# Patient Record
Sex: MALE | Race: BLACK OR AFRICAN AMERICAN | Employment: FULL TIME | URBAN - METROPOLITAN AREA
[De-identification: names, ages, dates, MRNs, and addresses within clinical notes are randomized per-mention and may not be internally consistent; named-entity substitution may affect disease eponyms.]

---

## 2017-01-12 ENCOUNTER — HOSPITAL ENCOUNTER (OUTPATIENT)
Dept: MRI IMAGING | Age: 62
Discharge: HOME | End: 2017-01-12
Attending: ORTHOPAEDIC SURGERY
Payer: COMMERCIAL

## 2017-01-12 DIAGNOSIS — M25.562 ACUTE PAIN OF LEFT KNEE: ICD-10-CM

## 2017-01-12 DIAGNOSIS — S76.112A QUADRICEPS TENDON RUPTURE, LEFT, INITIAL ENCOUNTER: ICD-10-CM

## 2017-01-12 PROCEDURE — 73721 MRI JNT OF LWR EXTRE W/O DYE: CPT

## 2017-01-16 PROBLEM — S76.112D QUADRICEPS TENDON RUPTURE, LEFT, SUBSEQUENT ENCOUNTER: Status: ACTIVE | Noted: 2017-01-16

## 2017-01-17 ENCOUNTER — LAB ENCOUNTER (OUTPATIENT)
Dept: LAB | Facility: HOSPITAL | Age: 62
End: 2017-01-17
Attending: INTERNAL MEDICINE
Payer: OTHER MISCELLANEOUS

## 2017-01-17 ENCOUNTER — OFFICE VISIT (OUTPATIENT)
Dept: INTERNAL MEDICINE CLINIC | Facility: CLINIC | Age: 62
End: 2017-01-17

## 2017-01-17 VITALS
BODY MASS INDEX: 40.63 KG/M2 | SYSTOLIC BLOOD PRESSURE: 138 MMHG | HEIGHT: 72 IN | DIASTOLIC BLOOD PRESSURE: 90 MMHG | WEIGHT: 300 LBS | HEART RATE: 76 BPM

## 2017-01-17 DIAGNOSIS — Z01.818 PREOP EXAMINATION: Primary | ICD-10-CM

## 2017-01-17 DIAGNOSIS — Z01.818 PREOP EXAMINATION: ICD-10-CM

## 2017-01-17 LAB
ALBUMIN SERPL BCP-MCNC: 4.1 G/DL (ref 3.5–4.8)
ALBUMIN/GLOB SERPL: 1.2 {RATIO} (ref 1–2)
ALP SERPL-CCNC: 45 U/L (ref 32–100)
ALT SERPL-CCNC: 21 U/L (ref 17–63)
ANION GAP SERPL CALC-SCNC: 12 MMOL/L (ref 0–18)
AST SERPL-CCNC: 26 U/L (ref 15–41)
BILIRUB SERPL-MCNC: 0.8 MG/DL (ref 0.3–1.2)
BUN SERPL-MCNC: 12 MG/DL (ref 8–20)
BUN/CREAT SERPL: 11.1 (ref 10–20)
CALCIUM SERPL-MCNC: 9.4 MG/DL (ref 8.5–10.5)
CHLORIDE SERPL-SCNC: 101 MMOL/L (ref 95–110)
CO2 SERPL-SCNC: 28 MMOL/L (ref 22–32)
CREAT SERPL-MCNC: 1.08 MG/DL (ref 0.5–1.5)
GLOBULIN PLAS-MCNC: 3.5 G/DL (ref 2.5–3.7)
GLUCOSE SERPL-MCNC: 109 MG/DL (ref 70–99)
OSMOLALITY UR CALC.SUM OF ELEC: 292 MOSM/KG (ref 275–295)
POTASSIUM SERPL-SCNC: 3.6 MMOL/L (ref 3.3–5.1)
PROT SERPL-MCNC: 7.6 G/DL (ref 5.9–8.4)
SODIUM SERPL-SCNC: 141 MMOL/L (ref 136–144)

## 2017-01-17 PROCEDURE — 93010 ELECTROCARDIOGRAM REPORT: CPT | Performed by: INTERNAL MEDICINE

## 2017-01-17 PROCEDURE — 93005 ELECTROCARDIOGRAM TRACING: CPT

## 2017-01-17 PROCEDURE — 36415 COLL VENOUS BLD VENIPUNCTURE: CPT

## 2017-01-17 PROCEDURE — 99212 OFFICE O/P EST SF 10 MIN: CPT | Performed by: INTERNAL MEDICINE

## 2017-01-17 PROCEDURE — 99204 OFFICE O/P NEW MOD 45 MIN: CPT | Performed by: INTERNAL MEDICINE

## 2017-01-17 PROCEDURE — 80053 COMPREHEN METABOLIC PANEL: CPT

## 2017-01-17 NOTE — H&P
Pre Op Visit    This patient tore his left quadriceps tendon at work 1 week ago slipping on some water. His left leg jacknifed. Of interest is he tore the same tendon with a similar injury 20 years ago.   He is scheduled to have it surgically reattacehd a (MULTIVITAMINS) Oral Cap, Take 1 capsule by mouth. Indications: 3 times week, mon/wed/fri, Disp: , Rfl:   •  aspirin 325 MG Oral Tab, Take 325 mg by mouth daily. , Disp: , Rfl:   •  Fish Oil-Cholecalciferol (FISH OIL + D3 OR), Take 1 capsule by mouth daily. no hepatosplenomegaly, non tender, no abdominal bruits  MS: normal ROM back and all extremities  No joint swelling or deformities left leg in immobilizer    Skin-no suspicious lesions or rash  Neurologic--Cranial Nerves II-XIIgrossly intact   Normal propri

## 2017-01-18 ENCOUNTER — TELEPHONE (OUTPATIENT)
Dept: INTERNAL MEDICINE CLINIC | Facility: CLINIC | Age: 62
End: 2017-01-18

## 2017-01-18 NOTE — TELEPHONE ENCOUNTER
----- Message from Ranjit Oviedo MD sent at 1/18/2017 12:01 PM CST -----        Patient to have surgery at [place] next week       Labs, ekg reviewed, he is medically cleared, see note

## 2017-02-20 ENCOUNTER — OFFICE VISIT (OUTPATIENT)
Dept: PHYSICAL THERAPY | Age: 62
End: 2017-02-20
Attending: ORTHOPAEDIC SURGERY
Payer: OTHER MISCELLANEOUS

## 2017-02-20 DIAGNOSIS — S76.112D QUADRICEPS TENDON RUPTURE, LEFT, SUBSEQUENT ENCOUNTER: Primary | ICD-10-CM

## 2017-02-20 PROCEDURE — 97110 THERAPEUTIC EXERCISES: CPT

## 2017-02-20 PROCEDURE — 97161 PT EVAL LOW COMPLEX 20 MIN: CPT

## 2017-02-20 NOTE — PROGRESS NOTES
POST-OP KNEE EVALUATION:   Referring Physician: Dr. Mark Atkins  Diagnosis: s/p L quadriceps tendon rupture repair 1/24/17  Date of Service: 2/20/2017     PATIENT SUMMARY   Mckinley Viramontes is a 63year old y/o male who presents to therapy today s/p quadriceps t tendon rupture repair on the L 1997    ASSESSMENT  Pt is appropriate for 4 weeks s/p L quadriceps tendon rupture repair. He has impairments in gait 2/2 to brace and DME, he appears to be following instructions for precautions. He has full extension ROM.  He Treatment Time: 35 min     PLAN OF CARE:    Goals: (To be met in 8 visits)   · Pt will improve knee AROM flexion to >110 degrees to improve ability to perform stairs, standing from chair.    · Pt will improve quad strength to 4/5 to ascend 1 flight of stair

## 2017-02-22 ENCOUNTER — OFFICE VISIT (OUTPATIENT)
Dept: PHYSICAL THERAPY | Age: 62
End: 2017-02-22
Attending: ORTHOPAEDIC SURGERY
Payer: OTHER MISCELLANEOUS

## 2017-02-22 PROCEDURE — 97016 VASOPNEUMATIC DEVICE THERAPY: CPT

## 2017-02-22 PROCEDURE — 97110 THERAPEUTIC EXERCISES: CPT

## 2017-02-23 NOTE — PROGRESS NOTES
Dx: s/p L quadriceps tendon rupture repair 1/24/17           Authorized # of Visits:  w/c         Next MD visit: Follow up appt: 3/20/17  Fall Risk: standard         Precautions: no active knee extension, 0-90 knee flexion           4 weeks out  Subjective 40 min  Total Treatment Time: 50 min

## 2017-02-27 ENCOUNTER — OFFICE VISIT (OUTPATIENT)
Dept: PHYSICAL THERAPY | Age: 62
End: 2017-02-27
Attending: ORTHOPAEDIC SURGERY
Payer: OTHER MISCELLANEOUS

## 2017-02-27 PROCEDURE — 97110 THERAPEUTIC EXERCISES: CPT

## 2017-02-27 PROCEDURE — 97530 THERAPEUTIC ACTIVITIES: CPT

## 2017-02-27 NOTE — PROGRESS NOTES
Dx: s/p L quadriceps tendon rupture repair 1/24/17           Authorized # of Visits:  w/c         Next MD visit: Follow up appt: 3/20/17  Fall Risk: standard         Precautions: no active knee extension, 0-90 knee flexion           4 weeks out  Subjective compression; 34 deg; 10 min Double leg heel raises 20 reps with UE assist         - Ice 10 min with extension        - -        - -        - -        HEP (written handouts provided) and pt education:  3 way hip with brace in place R/L     Charges:  There ex

## 2017-03-01 ENCOUNTER — OFFICE VISIT (OUTPATIENT)
Dept: PHYSICAL THERAPY | Age: 62
End: 2017-03-01
Attending: ORTHOPAEDIC SURGERY
Payer: OTHER MISCELLANEOUS

## 2017-03-01 PROCEDURE — 97530 THERAPEUTIC ACTIVITIES: CPT

## 2017-03-01 PROCEDURE — 97110 THERAPEUTIC EXERCISES: CPT

## 2017-03-01 NOTE — PROGRESS NOTES
Dx: s/p L quadriceps tendon rupture repair 1/24/17           Authorized # of Visits:  w/c         Next MD visit: Follow up appt: 3/20/17  Fall Risk: standard         Precautions: no active knee extension, 0-90 knee flexion           5 weeks out  Subjective WS with brace in // bars medial lateral 20 reps; forward/back 20 reps X forward back 20 reps Manual HS stretch 30 sec hold x 3 sets       Game ready system: medium compression; 34 deg; 10 min Double leg heel raises 20 reps with UE assist  X 20 reps

## 2017-03-06 ENCOUNTER — OFFICE VISIT (OUTPATIENT)
Dept: PHYSICAL THERAPY | Age: 62
End: 2017-03-06
Attending: ORTHOPAEDIC SURGERY
Payer: OTHER MISCELLANEOUS

## 2017-03-06 PROCEDURE — 97110 THERAPEUTIC EXERCISES: CPT

## 2017-03-06 PROCEDURE — 97530 THERAPEUTIC ACTIVITIES: CPT

## 2017-03-06 NOTE — PROGRESS NOTES
Dx: s/p L quadriceps tendon rupture repair 1/24/17           Authorized # of Visits:  w/c         Next MD visit: Follow up appt: 3/20/17  Fall Risk: standard         Precautions: no active knee extension, 0-90 knee flexion           6 weeks out  Subjective 10 reps x 2 sets X 10 reps x 2 sets 5 sec hold X 5 reps x 4 sets Manual HS stretch 30 sec hold x 3 sets      4 way hip lifts with brace 10 reps each 2 sets X R/L  X 1 set standing; 1 set supine Gait training with brace unlocked to 30 deg Vcs for knee bend,

## 2017-03-08 ENCOUNTER — OFFICE VISIT (OUTPATIENT)
Dept: PHYSICAL THERAPY | Age: 62
End: 2017-03-08
Attending: ORTHOPAEDIC SURGERY
Payer: OTHER MISCELLANEOUS

## 2017-03-08 PROCEDURE — 97110 THERAPEUTIC EXERCISES: CPT

## 2017-03-08 PROCEDURE — 97530 THERAPEUTIC ACTIVITIES: CPT

## 2017-03-08 NOTE — PROGRESS NOTES
Dx: s/p L quadriceps tendon rupture repair 1/24/17           Authorized # of Visits:  w/c         Next MD visit: Follow up appt: 3/20/17  Fall Risk: standard         Precautions: no active knee extension, 0-90 knee flexion           6.5 weeks out  Norman Prosper sets X 10 reps x 2 sets 5 sec hold X 5 reps x 4 sets Manual HS stretch 30 sec hold x 3 sets X 3 sets     4 way hip lifts with brace 10 reps each 2 sets X R/L  X 1 set standing; 1 set supine Gait training with brace unlocked to 30 deg Vcs for knee bendsiddharth

## 2017-03-13 ENCOUNTER — OFFICE VISIT (OUTPATIENT)
Dept: PHYSICAL THERAPY | Age: 62
End: 2017-03-13
Attending: ORTHOPAEDIC SURGERY
Payer: OTHER MISCELLANEOUS

## 2017-03-13 PROCEDURE — 97530 THERAPEUTIC ACTIVITIES: CPT

## 2017-03-13 PROCEDURE — 97110 THERAPEUTIC EXERCISES: CPT

## 2017-03-13 NOTE — PROGRESS NOTES
Dx: s/p L quadriceps tendon rupture repair 1/24/17           Authorized # of Visits:  w/c         Next MD visit: Follow up appt: 3/20/17  Fall Risk: standard         Precautions: no active knee extension, 0-90 knee flexion           7 weeks out  Subjective mobilization gr III medial/lateral, superior,inferior  5 min X 5 min -  SLR with brace in place manual assist 10 reps x 2 sets X 10 reps x 2 sets X 10 reps x 2 sets    Knee extension stretch with towel under heel 5 min X 5 min with gentle manual  rhythmic Sidelying hip abduction with manual assist 25% 10 reps brace immobilized in extension - - -     - - Supine SLR 10 reps with manual assist 50% brace immobilized in extension 10 reps - - Pt education: HEP recommendations, benefits, progress, ROM improvements

## 2017-03-15 ENCOUNTER — OFFICE VISIT (OUTPATIENT)
Dept: PHYSICAL THERAPY | Age: 62
End: 2017-03-15
Attending: ORTHOPAEDIC SURGERY
Payer: OTHER MISCELLANEOUS

## 2017-03-15 PROCEDURE — 97530 THERAPEUTIC ACTIVITIES: CPT

## 2017-03-15 PROCEDURE — 97110 THERAPEUTIC EXERCISES: CPT

## 2017-03-15 NOTE — PROGRESS NOTES
Dx: s/p L quadriceps tendon rupture repair 1/24/17           Authorized # of Visits:  w/c         Next MD visit: Follow up appt: 3/20/17  Fall Risk: standard         Precautions: no active knee extension, 0-90 knee flexion           7.5 weeks out  Progress was advised of these findings, precautions, and treatment options and has agreed to actively participate in planning and for this course of care. Thank you for your referral. If you have any questions, please contact me at Dept: [phone].     Sincere Game ready system: medium compression; 34 deg; 10 min Double leg heel raises 20 reps with UE assist  X 20 reps  Prone knee flexion 10 reps x 2 sets X 10 reps x2  sets TKE in standing blue ball 10 reps x 2 sets X 10 reps 2 sets   - Ice 10 min with extensi

## 2017-03-17 ENCOUNTER — HOSPITAL ENCOUNTER (EMERGENCY)
Facility: HOSPITAL | Age: 62
Discharge: HOME | End: 2017-03-17
Attending: EMERGENCY MEDICINE
Payer: COMMERCIAL

## 2017-03-17 ENCOUNTER — APPOINTMENT (OUTPATIENT)
Dept: ULTRASOUND IMAGING | Facility: HOSPITAL | Age: 62
End: 2017-03-17
Attending: EMERGENCY MEDICINE
Payer: COMMERCIAL

## 2017-03-17 ENCOUNTER — HOSPITAL ENCOUNTER (OUTPATIENT)
Age: 62
Discharge: ANOTHER HOSPITAL | End: 2017-03-17
Payer: COMMERCIAL

## 2017-03-17 VITALS
HEART RATE: 77 BPM | RESPIRATION RATE: 20 BRPM | SYSTOLIC BLOOD PRESSURE: 128 MMHG | TEMPERATURE: 98 F | OXYGEN SATURATION: 96 % | DIASTOLIC BLOOD PRESSURE: 70 MMHG

## 2017-03-17 VITALS
RESPIRATION RATE: 16 BRPM | HEIGHT: 72 IN | TEMPERATURE: 98 F | HEART RATE: 77 BPM | WEIGHT: 300 LBS | OXYGEN SATURATION: 96 % | DIASTOLIC BLOOD PRESSURE: 85 MMHG | BODY MASS INDEX: 40.63 KG/M2 | SYSTOLIC BLOOD PRESSURE: 133 MMHG

## 2017-03-17 DIAGNOSIS — L03.116 CELLULITIS OF LEFT LOWER EXTREMITY: ICD-10-CM

## 2017-03-17 DIAGNOSIS — M79.89 SWELLING OF LOWER EXTREMITY: ICD-10-CM

## 2017-03-17 DIAGNOSIS — R60.9 DEPENDENT EDEMA: Primary | ICD-10-CM

## 2017-03-17 DIAGNOSIS — M79.672 FOOT PAIN, LEFT: Primary | ICD-10-CM

## 2017-03-17 PROCEDURE — 99284 EMERGENCY DEPT VISIT MOD MDM: CPT

## 2017-03-17 PROCEDURE — 99215 OFFICE O/P EST HI 40 MIN: CPT

## 2017-03-17 PROCEDURE — 96372 THER/PROPH/DIAG INJ SC/IM: CPT

## 2017-03-17 PROCEDURE — 93971 EXTREMITY STUDY: CPT

## 2017-03-17 RX ORDER — KETOROLAC TROMETHAMINE 30 MG/ML
60 INJECTION, SOLUTION INTRAMUSCULAR; INTRAVENOUS ONCE
Status: COMPLETED | OUTPATIENT
Start: 2017-03-17 | End: 2017-03-17

## 2017-03-17 RX ORDER — CLINDAMYCIN HYDROCHLORIDE 300 MG/1
300 CAPSULE ORAL 3 TIMES DAILY
Qty: 30 CAPSULE | Refills: 0 | Status: SHIPPED | OUTPATIENT
Start: 2017-03-17 | End: 2017-03-27

## 2017-03-17 RX ORDER — HYDROCODONE BITARTRATE AND ACETAMINOPHEN 5; 325 MG/1; MG/1
1-2 TABLET ORAL EVERY 6 HOURS PRN
Qty: 15 TABLET | Refills: 0 | Status: SHIPPED | OUTPATIENT
Start: 2017-03-17 | End: 2017-10-03 | Stop reason: ALTCHOICE

## 2017-03-17 NOTE — ED PROVIDER NOTES
Patient Seen in: THE MEDICAL [facility]    History   Patient presents with:  Swelling    Stated Complaint: s/p surgery 8 wks ago - l foot swelling and pain    HPI    42-year-old male who had surgery in his left quadricep 8 weeks ago present (FLAXSEED OIL) 1000 MG Oral Cap,  Take  by mouth. Psyllium (TGT PSYLLIUM FIBER) 0.52 G Oral Cap,  Take  by mouth. Multiple Vitamin (MULTIVITAMINS) Oral Cap,  Take 1 capsule by mouth.  Indications: 3 times week, mon/wed/fri   Fish Oil-Cholecalciferol (FI respiratory distress. He has no rhonchi. Musculoskeletal: Normal range of motion. Diffuse left foot swelling with some mild erythema just proximal to the left third and fourth toes. Mildly warm to touch.   Patient has point tenderness on the plantar rodriguez

## 2017-03-18 NOTE — ED PROVIDER NOTES
Patient Seen in: [facility] Emergency Department    History   Patient presents with:  Cellulitis (integumentary, infectious)  Postop/Procedure Problem    Stated Complaint: cellulitis to his left foot, post op    HPI    Patient is a 72-year-old male Hr,  1 tablet by mouth daily   AmLODIPine Besylate (NORVASC) 5 MG Oral Tab,  1 tablet by mouth daily   Albuterol Sulfate HFA (PROVENTIL HFA) 108 (90 BASE) MCG/ACT Inhalation Aero Soln,  Inhale 2 puffs into the lungs every 4 (four) hours as needed.    Flutic 1927 None (Room air)       Current:/88 mmHg  Pulse 70  Temp(Src) 97.8 °F (36.6 °C) (Temporal)  Resp 18  Ht 182.9 cm (6')  Wt 136.079 kg  BMI 40.68 kg/m2  SpO2 96%        Physical Exam    GENERAL: Well-developed, well-nourished   Male sitting up breat home and return to the ER immediately if symptoms worsen or if any other problems arise. Patient was discharged home with no further new complaints.           Disposition and Plan     Clinical Impression:  Dependent edema  (primary encounter diagnosis)  Ce

## 2017-03-18 NOTE — ED NOTES
Pt back in room from [address]. Requested something to eat and drink. Ok per MD and pt provided with a turkey sandwich and a cup of water.

## 2017-03-18 NOTE — ED INITIAL ASSESSMENT (HPI)
Referral from [address] [facility]. Pt complained of L foot pain & swelling, S/P muscle repair 8 weeks.  Denies any fever

## 2017-03-22 ENCOUNTER — OFFICE VISIT (OUTPATIENT)
Dept: PHYSICAL THERAPY | Age: 62
End: 2017-03-22
Attending: INTERNAL MEDICINE
Payer: OTHER MISCELLANEOUS

## 2017-03-22 PROCEDURE — 97530 THERAPEUTIC ACTIVITIES: CPT

## 2017-03-22 PROCEDURE — 97110 THERAPEUTIC EXERCISES: CPT

## 2017-03-22 NOTE — PROGRESS NOTES
Dx: s/p L quadriceps tendon rupture repair 1/24/17           Authorized # of Visits:  w/c         Next MD visit: Follow up appt: 3/20/17  Fall Risk: standard         Precautions: no active knee extension, 0-90 knee flexion           7.5 weeks out  [place] Courser will be progressed    Plan: Continue per plan of care. Plan for next session: warm up in nu step without brace; progress per protocol.        Date: 2/22/2017  Tx#: 2/8 Date: 2/27/17  Tx#: 3/8 Date: 3/1/17  Tx#: 4/8 Date: 3/6/17  Tx#: 5/8 Date: 3/8/17  Tx#: reps  X good control into extension 10 reps Gait in // bars with brace unlocked 60 deg, no UE assist, good control, good heel strike and knee flexion  10 min   Game ready system: medium compression; 34 deg; 10 min Double leg heel raises 20 reps with UE ass

## 2017-03-23 ENCOUNTER — APPOINTMENT (OUTPATIENT)
Dept: PHYSICAL THERAPY | Age: 62
End: 2017-03-23
Attending: INTERNAL MEDICINE
Payer: OTHER MISCELLANEOUS

## 2017-03-28 ENCOUNTER — OFFICE VISIT (OUTPATIENT)
Dept: PHYSICAL THERAPY | Age: 62
End: 2017-03-28
Attending: INTERNAL MEDICINE
Payer: OTHER MISCELLANEOUS

## 2017-03-28 PROCEDURE — 97110 THERAPEUTIC EXERCISES: CPT

## 2017-03-28 PROCEDURE — 97530 THERAPEUTIC ACTIVITIES: CPT

## 2017-03-28 NOTE — PROGRESS NOTES
Dx: s/p L quadriceps tendon rupture repair 1/24/17           Authorized # of Visits:  w/c         Next MD visit: Follow up appt: 4/17/17  Fall Risk: standard         Precautions: no active knee extension, 0-90 knee flexion         Subjective: Pt reports so sets X 10 reps x 2 sets Quad set 10 sec hold 10 reps x2 sets Nu step 5 min level 3 UE assist level 10     10 reps x 2 sets X 2 sets 10 reps X2 sets 10 reps SLR w/o brace <10% manual assistance 10 reps x 2 sets Walking in // bars no uE assist and without br raised surface- UE assist, knee flexion 0-40 deg, most of weight on RLE, no buckling, good control into TKE 10 reps     - Pt education: HEP recommendations, benefits, progress, ROM improvements - Pt education: HEP update, progress, plan for slow progressio

## 2017-03-30 ENCOUNTER — OFFICE VISIT (OUTPATIENT)
Dept: PHYSICAL THERAPY | Age: 62
End: 2017-03-30
Attending: INTERNAL MEDICINE
Payer: OTHER MISCELLANEOUS

## 2017-03-30 PROCEDURE — 97110 THERAPEUTIC EXERCISES: CPT

## 2017-03-30 PROCEDURE — 97530 THERAPEUTIC ACTIVITIES: CPT

## 2017-03-30 NOTE — PROGRESS NOTES
Dx: s/p L quadriceps tendon rupture repair 1/24/17           Authorized # of Visits:  w/c         Next MD visit: Follow up appt: 4/17/17  Fall Risk: standard         Precautions: no active knee extension, 0-90 knee flexion         Subjective: Pt reports no strength; resistance to HS, consider ball bridge.  Update HEP      Date: 3/8/17  Tx#: 6/8 Date: 3/13/2017  Tx#: 7/8 Date: 3/15/2017  Tx#: 8/8 Date: 3/22/17  Tx#: 9/16 3/28/2017   Tx#: 10/16 3/30/2017   Tx#: 11/16    X 10 reps x 2 sets X 10 reps x 2 sets X 1 assistance without ROM above 10 reps 5 sec hold Ambulation with brace unlocked 45 deg 5 min outside // bars w/ HHA; ambulation with brace unlocked 45 deg in // bars no DME VCs for trunk position stance time, knee flexion, Heel strike 5 min - Supine heel sl

## 2017-04-03 ENCOUNTER — OFFICE VISIT (OUTPATIENT)
Dept: PHYSICAL THERAPY | Age: 62
End: 2017-04-03
Attending: INTERNAL MEDICINE
Payer: OTHER MISCELLANEOUS

## 2017-04-03 PROCEDURE — 97530 THERAPEUTIC ACTIVITIES: CPT

## 2017-04-03 PROCEDURE — 97110 THERAPEUTIC EXERCISES: CPT

## 2017-04-03 NOTE — PROGRESS NOTES
Dx: s/p L quadriceps tendon rupture repair 1/24/17           Authorized # of Visits:  w/c         Next MD visit: Follow up appt: 4/17/17  Fall Risk: standard         Precautions: per protocol      Subjective: Pt reports feeling sharp pain in knee today whi HS, consider ball bridge.  Update HEP      Date: 3/8/17  Tx#: 6/8 Date: 3/13/2017  Tx#: 7/8 Date: 3/15/2017  Tx#: 8/8 Date: 3/22/17  Tx#: 9/16 3/28/2017   Tx#: 10/16 3/30/2017   Tx#: 11/16 4/3/2017   Tx#: 12/16   X 10 reps x 2 sets X 10 reps x 2 sets X 10 r curls red band 15 reps x 2 sets   Standing mini squats in // bars ~30 deg 10 repsx 2 sets  Heel slides actively 10 reps x 2sets; manual assistance without ROM above 10 reps 5 sec hold Ambulation with brace unlocked 45 deg 5 min outside // bars w/ HHA; ambu

## 2017-04-05 ENCOUNTER — OFFICE VISIT (OUTPATIENT)
Dept: PHYSICAL THERAPY | Age: 62
End: 2017-04-05
Attending: INTERNAL MEDICINE
Payer: OTHER MISCELLANEOUS

## 2017-04-05 PROCEDURE — 97110 THERAPEUTIC EXERCISES: CPT

## 2017-04-05 PROCEDURE — 97530 THERAPEUTIC ACTIVITIES: CPT

## 2017-04-05 NOTE — PROGRESS NOTES
Dx: s/p L quadriceps tendon rupture repair 1/24/17           Authorized # of Visits:  w/c         Next MD visit: Follow up appt: 4/17/17  Fall Risk: standard         Precautions: per protocol      Subjective: Pt reports no sharp pains in knee, less bucklin 11/16 4/3/2017   Tx#: 12/16 4/5/2017   Tx#: 13/16   X 10 reps x 2 sets X 10 reps x 2 sets X 10 reps x 2 sets Quad set 10 sec hold 10 reps x2 sets Nu step 5 min level 3 UE assist level 10 X 6 min level 4 X 5 min level 4  X 5 min level 5   10 reps x 2 sets X // bars ~30 deg 10 repsx 2 sets  Heel slides actively 10 reps x 2sets; manual assistance without ROM above 10 reps 5 sec hold Ambulation with brace unlocked 45 deg 5 min outside // bars w/ HHA; ambulation with brace unlocked 45 deg in // bars no DME VCs fo

## 2017-04-06 ENCOUNTER — APPOINTMENT (OUTPATIENT)
Dept: PHYSICAL THERAPY | Age: 62
End: 2017-04-06
Attending: INTERNAL MEDICINE
Payer: OTHER MISCELLANEOUS

## 2017-04-10 ENCOUNTER — OFFICE VISIT (OUTPATIENT)
Dept: PHYSICAL THERAPY | Age: 62
End: 2017-04-10
Attending: INTERNAL MEDICINE
Payer: OTHER MISCELLANEOUS

## 2017-04-10 PROCEDURE — 97110 THERAPEUTIC EXERCISES: CPT

## 2017-04-10 PROCEDURE — 97530 THERAPEUTIC ACTIVITIES: CPT

## 2017-04-10 NOTE — PROGRESS NOTES
Dx: s/p L quadriceps tendon rupture repair 1/24/17           Authorized # of Visits:  w/c         Next MD visit: Follow up appt: 4/17/17  Fall Risk: standard         Precautions: per protocol      Subjective: Pt reports woke up with ache feeling in L thigh assist level 10 X 6 min level 4 X 5 min level 4  X 5 min level 5 X 5 min level 5    SLR w/o brace <10% manual assistance 10 reps x 2 sets Walking in // bars no uE assist and without brace 6 min X 10 min- VCs for heel strike step length, good stance time X control, pain as fatigued, deferred further   Pt education: HEP update, progress, plan for slow progression out of brace and strategies to avoid buckling Ice supine 10 min  CP 10 min supine SLR 10 reps CP 10 min Supine SLR 10 reps on the L   HEP (written h

## 2017-04-12 ENCOUNTER — OFFICE VISIT (OUTPATIENT)
Dept: PHYSICAL THERAPY | Age: 62
End: 2017-04-12
Attending: INTERNAL MEDICINE
Payer: OTHER MISCELLANEOUS

## 2017-04-12 PROCEDURE — 97110 THERAPEUTIC EXERCISES: CPT

## 2017-04-12 PROCEDURE — 97530 THERAPEUTIC ACTIVITIES: CPT

## 2017-04-12 NOTE — PROGRESS NOTES
Dx: s/p L quadriceps tendon rupture repair 1/24/17           Authorized # of Visits:  w/c         Next MD visit: Follow up appt: 4/17/17  Fall Risk: standard         Precautions: per protocol    Progress Note Recommending 6-8 additional therapy sessions. safety and independence with gait on uneven surfaces such as grass  · Pt will be independent and compliant with comprehensive HEP to maintain progress achieved in PT- MET but will be progressed    Plan: Continue per plan of care for additional 6-8 therapy band 30 reps x 2 sets Seated TKE 10 reps x 2 sets X 10 x2 Gait with SPC no brace in place 5 min LAQ 15 reps x 2 sets 2 lbs   Nu step 5 min level 3 with UE at 11 Sidelying abduction 10 reps x 2 sets Seated Knee flexion 15 reps Seated HS curls red band 15 re

## 2017-04-17 ENCOUNTER — APPOINTMENT (OUTPATIENT)
Dept: PHYSICAL THERAPY | Age: 62
End: 2017-04-17
Attending: INTERNAL MEDICINE
Payer: OTHER MISCELLANEOUS

## 2017-04-19 ENCOUNTER — OFFICE VISIT (OUTPATIENT)
Dept: PHYSICAL THERAPY | Age: 62
End: 2017-04-19
Attending: INTERNAL MEDICINE
Payer: OTHER MISCELLANEOUS

## 2017-04-19 PROCEDURE — 97530 THERAPEUTIC ACTIVITIES: CPT

## 2017-04-19 PROCEDURE — 97110 THERAPEUTIC EXERCISES: CPT

## 2017-04-19 NOTE — PROGRESS NOTES
Dx: s/p L quadriceps tendon rupture repair 1/24/17           Authorized # of Visits:  w/c         Next MD visit: Follow up appt: 6/5/17  Fall Risk: standard         Precautions: per protocol      Subjective: Pt reports going back to physician.  Follow up in yellow band 10 laps in // bars (10') each  X 10 laps    X 10 x2 Gait with SPC no brace in place 5 min LAQ 15 reps x 2 sets 2 lbs Standing hip flexion yellow band 10 reps; standing hip extension 10 reps yellow band on L x2 sets each    X 15x2 Lateral walks

## 2017-04-20 ENCOUNTER — OFFICE VISIT (OUTPATIENT)
Dept: PHYSICAL THERAPY | Age: 62
End: 2017-04-20
Attending: INTERNAL MEDICINE
Payer: OTHER MISCELLANEOUS

## 2017-04-20 PROCEDURE — 97530 THERAPEUTIC ACTIVITIES: CPT

## 2017-04-20 PROCEDURE — 97110 THERAPEUTIC EXERCISES: CPT

## 2017-04-20 NOTE — PROGRESS NOTES
Dx: s/p L quadriceps tendon rupture repair 1/24/17           Authorized # of Visits:  w/c         Next MD visit: Follow up appt: 6/5/17  Fall Risk: standard         Precautions: per protocol      Subjective: Pt reports no soreness after last session.  Chief lengths no resistance; 4 lengths yellow band  Seated knee flexion stretch 10 sec hold 10 rpes Lateral walks yellow band 10 laps in // bars (10') each  X 10 laps  Standing HS curls 10 reps x 2 sets   X 10 x2 Gait with SPC no brace in place 5 min LAQ 15 reps

## 2017-04-24 ENCOUNTER — OFFICE VISIT (OUTPATIENT)
Dept: PHYSICAL THERAPY | Age: 62
End: 2017-04-24
Attending: INTERNAL MEDICINE
Payer: OTHER MISCELLANEOUS

## 2017-04-24 PROCEDURE — 97110 THERAPEUTIC EXERCISES: CPT

## 2017-04-24 PROCEDURE — 97530 THERAPEUTIC ACTIVITIES: CPT

## 2017-04-24 NOTE — PROGRESS NOTES
Dx: s/p L quadriceps tendon rupture repair 1/24/17           Authorized # of Visits:  w/c         Next MD visit: Follow up appt: 6/5/17  Fall Risk: standard         Precautions: per protocol      Subjective: Pt reports muscle soreness <24 hour after last s intermittently on the L X  2 min on L; 1 min on R Standing marches 10 reps x 2 sets- UE assist Marches 20 reps x 2 sets   X 6 lengths no resistance; 4 lengths yellow band  Seated knee flexion stretch 10 sec hold 10 rpes Lateral walks yellow band 10 laps in set, heel raises  4/3/2017: Long arc quad, HS curls with red band  4/19/2017: Hip flexion yellow band; extension yellow band  4/24/2017: step ups at home- slow controlled  CP 10 min   Charges:  There ex: 2; There act: 1  Total Timed Treatment: 40 min  Total

## 2017-04-26 ENCOUNTER — OFFICE VISIT (OUTPATIENT)
Dept: PHYSICAL THERAPY | Age: 62
End: 2017-04-26
Attending: INTERNAL MEDICINE
Payer: OTHER MISCELLANEOUS

## 2017-04-26 PROCEDURE — 97530 THERAPEUTIC ACTIVITIES: CPT

## 2017-04-26 PROCEDURE — 97110 THERAPEUTIC EXERCISES: CPT

## 2017-04-26 NOTE — PROGRESS NOTES
Dx: s/p L quadriceps tendon rupture repair 1/24/17           Authorized # of Visits:  w/c         Next MD visit: Follow up appt: 6/5/17  Fall Risk: standard         Precautions: per protocol      Subjective: Pt reports muscle soreness <24 hour after last s level 4    X 5 min Reassessment 5 min Gait without brace in // bars without SPC 5 min, intact X outside // bars with SPC Gait in // bars without SPC 5 min X 5 min X forward retro walking- 10 laps each     X 25 reps  LAQ 15 reps x 2 sets SLS 2 min intermitt the L CP 10 min X 10 min Rocker board forward/back 4 min X- 3 min Shuttle DLHR L 4 20 reps x 2 sets; SLHR L 4 10 reps each side       Standing quad stretch 30 sec hold x 3 sets Step downs 4 inch with UE assist- poor quad control and poor patellar tracking

## 2017-05-01 ENCOUNTER — OFFICE VISIT (OUTPATIENT)
Dept: PHYSICAL THERAPY | Age: 62
End: 2017-05-01
Attending: INTERNAL MEDICINE
Payer: COMMERCIAL

## 2017-05-01 PROCEDURE — 97530 THERAPEUTIC ACTIVITIES: CPT

## 2017-05-01 PROCEDURE — 97110 THERAPEUTIC EXERCISES: CPT

## 2017-05-01 NOTE — PROGRESS NOTES
Dx: s/p L quadriceps tendon rupture repair 1/24/17           Authorized # of Visits:  w/c         Next MD visit: Follow up appt: 6/5/17  Fall Risk: standard         Precautions: per protocol      Subjective: Pt reports with progressions feeling up to 4/10 min on L; 1 min on R Standing marches 10 reps x 2 sets- UE assist Marches 20 reps x 2 sets X 20 x 2  Tandem walking forward/back 3 laps   X 10 laps  Standing HS curls 10 reps x 2 sets Standing HS curls 10 reps 2 lbs x 2 sets X 30 sec hold x 3 sets each leslie quad, HS curls with red band  4/19/2017: Hip flexion yellow band; extension yellow band  4/24/2017: step ups at home- slow controlled  CP 10 min   4/26/2017: walking outside short distance, nice weather, even terrain, without brace, with use of SPC  Charge

## 2017-05-03 ENCOUNTER — OFFICE VISIT (OUTPATIENT)
Dept: PHYSICAL THERAPY | Age: 62
End: 2017-05-03
Attending: INTERNAL MEDICINE
Payer: COMMERCIAL

## 2017-05-03 PROCEDURE — 97530 THERAPEUTIC ACTIVITIES: CPT

## 2017-05-03 PROCEDURE — 97110 THERAPEUTIC EXERCISES: CPT

## 2017-05-03 NOTE — PROGRESS NOTES
Dx: s/p L quadriceps tendon rupture repair 1/24/17           Authorized # of Visits:  w/c         Next MD visit: Follow up appt: 6/5/17  Fall Risk: standard         Precautions: per protocol      Subjective: Pt reports soreness in muscles/knee after last s 5/1/2017   Tx#: 20/24 5/3/2017   Tx#: 21/24   X 5 min X 5 min X 5 min- NO UE assist level 3  X level 4  X level 5  X level 4   X outside // bars with SPC Gait in // bars without SPC 5 min X 5 min X forward retro walking- 10 laps each   X 10 laps each  X 10 chain curls 4 laps 25' each lap Reassessment 10 min    Standing quad stretch 30 sec hold x 3 sets Step downs 4 inch with UE assist- poor quad control and poor patellar tracking - Ice supine 10 min X 10 min    HEP (written handouts provided) and pt educatio

## 2017-05-08 ENCOUNTER — OFFICE VISIT (OUTPATIENT)
Dept: PHYSICAL THERAPY | Age: 62
End: 2017-05-08
Attending: INTERNAL MEDICINE
Payer: COMMERCIAL

## 2017-05-08 PROCEDURE — 97530 THERAPEUTIC ACTIVITIES: CPT

## 2017-05-08 PROCEDURE — 97110 THERAPEUTIC EXERCISES: CPT

## 2017-05-08 NOTE — PROGRESS NOTES
Dx: s/p L quadriceps tendon rupture repair 1/24/17           Authorized # of Visits:  w/c         Next MD visit: Follow up appt: 6/5/17  Fall Risk: standard         Precautions: per protocol    Progress Note: - Recommending 6 additional therapy sessions  P in PT- MET but will be progressed    Plan: Continue per plan of care for additional 6 therapy sessions for a total of 28 therapy sessions.    Patient/Family/Caregiver was advised of these findings, precautions, and treatment options and has agreed to active sets L/R   Shuttle [facility] 20 reps L5 x 2 sets Double leg heel raises 10 reps x 2 sets X no UE assist Foam weight shifts 20 reps  Shuttle DLHR L 4 20 reps x 2 sets; SLHR L 4 10 reps each side Step down 6 inch 5 reps x 2 sets- UE assist Step ups 6 inch 10 reps

## 2017-05-10 ENCOUNTER — APPOINTMENT (OUTPATIENT)
Dept: PHYSICAL THERAPY | Age: 62
End: 2017-05-10
Attending: INTERNAL MEDICINE
Payer: COMMERCIAL

## 2017-05-10 ENCOUNTER — OFFICE VISIT (OUTPATIENT)
Dept: INTERNAL MEDICINE CLINIC | Facility: CLINIC | Age: 62
End: 2017-05-10

## 2017-05-10 VITALS
BODY MASS INDEX: 42 KG/M2 | RESPIRATION RATE: 18 BRPM | HEART RATE: 74 BPM | WEIGHT: 313 LBS | SYSTOLIC BLOOD PRESSURE: 128 MMHG | DIASTOLIC BLOOD PRESSURE: 83 MMHG

## 2017-05-10 DIAGNOSIS — M79.672 LEFT FOOT PAIN: Primary | ICD-10-CM

## 2017-05-10 PROCEDURE — 99212 OFFICE O/P EST SF 10 MIN: CPT | Performed by: INTERNAL MEDICINE

## 2017-05-10 PROCEDURE — 99213 OFFICE O/P EST LOW 20 MIN: CPT | Performed by: INTERNAL MEDICINE

## 2017-05-10 RX ORDER — IBUPROFEN 600 MG/1
600 TABLET ORAL 2 TIMES DAILY WITH MEALS
Qty: 30 TABLET | Refills: 0 | Status: SHIPPED | OUTPATIENT
Start: 2017-05-10 | End: 2018-05-11 | Stop reason: ALTCHOICE

## 2017-05-10 NOTE — PROGRESS NOTES
Patient ID: Cheryl Horner is a 63year old male. Patient presents with:   Foot Pain: presenting for swelling Lt foot began lastnight        HPI   Here with wife elizabeth   C/o ball of the foot pain , below 2nd toe x one day --started last night and cant walk o Disp: 30 tablet Rfl: 0   HYDROcodone-acetaminophen 5-325 MG Oral Tab Take 1-2 tablets by mouth every 6 (six) hours as needed. Disp: 15 tablet Rfl: 0   JUBLIA 10 % External Solution GENTLY APPLY A THIN LAYER TO AFFECTED NAILS ONCE DAILY AS DIRECTED.  Disp: 8 apparent distress  SKIN: no rashes,no suspicious lesions  HEENT: atraumatic, normocephalic,ears and throat are clear,   NECK: supple,no adenopathy,  LUNGS: clear to auscultation, no wheeze  CARDIO: RRR without murmur    EXTREMITIES: no cyanosis, or edema,

## 2017-05-11 ENCOUNTER — APPOINTMENT (OUTPATIENT)
Dept: PHYSICAL THERAPY | Age: 62
End: 2017-05-11
Attending: INTERNAL MEDICINE
Payer: COMMERCIAL

## 2017-05-15 ENCOUNTER — OFFICE VISIT (OUTPATIENT)
Dept: PHYSICAL THERAPY | Age: 62
End: 2017-05-15
Attending: INTERNAL MEDICINE
Payer: COMMERCIAL

## 2017-05-15 PROCEDURE — 97530 THERAPEUTIC ACTIVITIES: CPT

## 2017-05-15 PROCEDURE — 97110 THERAPEUTIC EXERCISES: CPT

## 2017-05-15 NOTE — PROGRESS NOTES
Dx: s/p L quadriceps tendon rupture repair 1/24/17           Authorized # of Visits:  w/c         Next MD visit: Follow up appt: 6/5/17  Fall Risk: standard         Precautions: per protocol    Subjective: Pt reports slight set back after the onset of 2nd 5/8/2017   Tx#: 22/24 5/15/2017   Tx#: 23/ 28   X 5 min X 5 min X 5 min- NO UE assist level 3  X level 4  X level 5  X level 4 X level 4 5 min X level 4 5 no UE assist   X outside // bars with SPC Gait in // bars without SPC 5 min X 5 min X forward retro w 10 reps each side BL UE assist- slow controlled Taping medial patellar pull 10 min  Step down 6 inch 10 reps UE assist Step down 6 inch 20 reps UE assist   -  Rocker board medial- lateral 4 min X- 3 min Shuttle SLP L4 15 reps x 2 sets 6 inch step down 10 r

## 2017-05-17 ENCOUNTER — APPOINTMENT (OUTPATIENT)
Dept: PHYSICAL THERAPY | Age: 62
End: 2017-05-17
Attending: INTERNAL MEDICINE
Payer: COMMERCIAL

## 2017-05-18 ENCOUNTER — OFFICE VISIT (OUTPATIENT)
Dept: PHYSICAL THERAPY | Age: 62
End: 2017-05-18
Attending: INTERNAL MEDICINE
Payer: COMMERCIAL

## 2017-05-18 PROCEDURE — 97110 THERAPEUTIC EXERCISES: CPT

## 2017-05-18 PROCEDURE — 97530 THERAPEUTIC ACTIVITIES: CPT

## 2017-05-18 NOTE — PROGRESS NOTES
Dx: s/p L quadriceps tendon rupture repair 1/24/17           Authorized # of Visits:  w/c         Next MD visit: Follow up appt: 6/5/17  Fall Risk: standard         Precautions: per protocol    Subjective: Pt reports any pain after last session was minimal X  20 reps Walking without SPC 10 laps forward/retro    X 20 x 2  Tandem walking forward/back 3 laps X 8 laps- intermittent UE assist TKE green band CC 30 reps  Double leg heel raises 20 reps X 15 reps   Single leg heel raises 10 reps R/L   X 30 sec hold with brace in place R/L , heel raises (brace in place)  3/8/17L Prone HS contraction, walking with brace unlocked to 30 deg; quad set, heel slides  3/22/17: :SLR with brace, hip abduction; prone HS flexion; walking with brace unlocked 60 deg, quad set, siddharth

## 2017-05-22 ENCOUNTER — OFFICE VISIT (OUTPATIENT)
Dept: PHYSICAL THERAPY | Age: 62
End: 2017-05-22
Attending: INTERNAL MEDICINE
Payer: COMMERCIAL

## 2017-05-22 PROCEDURE — 97530 THERAPEUTIC ACTIVITIES: CPT

## 2017-05-22 PROCEDURE — 97110 THERAPEUTIC EXERCISES: CPT

## 2017-05-22 NOTE — PROGRESS NOTES
Dx: s/p L quadriceps tendon rupture repair 1/24/17           Authorized # of Visits:  w/c         Next MD visit: Follow up appt: 6/5/17  Fall Risk: standard         Precautions: per protocol    Subjective: Pt reports some buckling but hard to predict.  He d laps forward/retro  X 10 laps    X 20 x 2  Tandem walking forward/back 3 laps X 8 laps- intermittent UE assist TKE green band CC 30 reps  Double leg heel raises 20 reps X 15 reps   Single leg heel raises 10 reps R/L X DLHR 20 reps; [id number] 10 reps x 2 sets R/ min  Gait 250' w/o SPC X 200' no SPC VCs for normalized gait 1 FOS SPC and rail reciprocally WFL  -   HEP (written handouts provided) and pt education:  3 way hip with brace in place R/L , heel raises (brace in place)  3/8/17L Prone HS contraction, walking

## 2017-05-31 ENCOUNTER — OFFICE VISIT (OUTPATIENT)
Dept: PHYSICAL THERAPY | Age: 62
End: 2017-05-31
Attending: INTERNAL MEDICINE
Payer: COMMERCIAL

## 2017-05-31 PROCEDURE — 97110 THERAPEUTIC EXERCISES: CPT

## 2017-05-31 PROCEDURE — 97530 THERAPEUTIC ACTIVITIES: CPT

## 2017-05-31 RX ORDER — SILDENAFIL CITRATE 50 MG
TABLET ORAL
Qty: 6 TABLET | Refills: 3 | Status: SHIPPED | OUTPATIENT
Start: 2017-05-31 | End: 2019-12-23

## 2017-05-31 NOTE — PROGRESS NOTES
Dx: s/p L quadriceps tendon rupture repair 1/24/17           Authorized # of Visits:  w/c         Next MD visit: Follow up appt: 6/5/17  Fall Risk: standard         Precautions: per protocol    Subjective: Pt reports almost forgetting his cane multiple eren Walking without SPC 10 laps forward/retro  X 10 laps  X 5 laps   X 20 x 2  Tandem walking forward/back 3 laps X 8 laps- intermittent UE assist TKE green band CC 30 reps  Double leg heel raises 20 reps X 15 reps   Single leg heel raises 10 reps R/L X DLHR 2 Shuttle SLP L4 10 reps x 2 sets R/L -  -   Shuttle DLHR L 4 20 reps x 2 sets; SLHR L 4 10 reps each side HS closed chain curls 4 laps 25' each lap Reassessment 10 min Concentric HS closed chain curls 5 laps X 6 laps Gait 500' no SPC gait WFL  X 250' WFL -

## 2017-06-06 ENCOUNTER — TELEPHONE (OUTPATIENT)
Dept: INTERNAL MEDICINE CLINIC | Facility: CLINIC | Age: 62
End: 2017-06-06

## 2017-06-07 ENCOUNTER — OFFICE VISIT (OUTPATIENT)
Dept: PHYSICAL THERAPY | Age: 62
End: 2017-06-07
Attending: INTERNAL MEDICINE
Payer: OTHER MISCELLANEOUS

## 2017-06-07 PROCEDURE — 97110 THERAPEUTIC EXERCISES: CPT

## 2017-06-07 PROCEDURE — 97530 THERAPEUTIC ACTIVITIES: CPT

## 2017-06-07 NOTE — PROGRESS NOTES
Dx: s/p L quadriceps tendon rupture repair 1/24/17           Authorized # of Visits:  w/c         Next MD visit: Follow up appt: 6/5/17  Fall Risk: standard         Precautions: per protocol    Discharge Summary:   Pt attended 26 visits in Physical Therapy [phone].     Sincerely,  Electronically signed by therapist: Negro Mnoaco, PT        5/15/2017   Tx#: 23/ 28 5/18/2017   Tx#: 24/28 5/22/2017   Tx#: 25/28 5/31/2017   Tx#: 26/28 6/7/2017   Tx#: 27/27   X level 4 5 no UE assist Nu step level 4 5 min set, heel raises  4/3/2017: Long arc quad, HS curls with red band  4/19/2017: Hip flexion yellow band; extension yellow band  4/24/2017: step ups at home- slow controlled  4/26/2017: walking outside short distance, nice weather, even terrain, w/o brace, w/

## 2017-06-12 ENCOUNTER — OFFICE VISIT (OUTPATIENT)
Dept: PHYSICAL THERAPY | Age: 62
End: 2017-06-12
Attending: ORTHOPAEDIC SURGERY
Payer: OTHER MISCELLANEOUS

## 2017-06-12 PROCEDURE — 97545 WORK HARDENING: CPT

## 2017-06-13 ENCOUNTER — OFFICE VISIT (OUTPATIENT)
Dept: PHYSICAL THERAPY | Age: 62
End: 2017-06-13
Attending: INTERNAL MEDICINE
Payer: OTHER MISCELLANEOUS

## 2017-06-13 PROCEDURE — 97545 WORK HARDENING: CPT

## 2017-06-13 NOTE — PROGRESS NOTES
Dx: work conditioning s/p L quad tendon repair 1/24/2017        Authorized # of Visits:  10 sessions         Next MD visit: none scheduled  Fall Risk: standard         Precautions: n/a             Subjective: Pt has brought his paperwork with him today, do

## 2017-06-14 ENCOUNTER — OFFICE VISIT (OUTPATIENT)
Dept: PHYSICAL THERAPY | Age: 62
End: 2017-06-14
Attending: INTERNAL MEDICINE
Payer: OTHER MISCELLANEOUS

## 2017-06-14 PROCEDURE — 97545 WORK HARDENING: CPT

## 2017-06-14 PROCEDURE — 97546 WORK HARDENING ADD-ON: CPT

## 2017-06-14 NOTE — PROGRESS NOTES
Work Conditioning Evaluation:   Referring Physician: Dr. Kevyn Cole MD  Diagnosis:   L quad tendon repair 1/24/2017  Date of Service: 6/12/2017     PATIENT SUMMARY   Corine Pete is a 63year old male  who comes to work conditioning Client was oriented to clinic and fitness center equipment and policies. Pt was 30 minutes late for initial session. To return tomorrow to establish program.       Charges: Initial 2 hours work conditioning.        Total Timed Treatment: 70 min        MITCHEL

## 2017-06-14 NOTE — PROGRESS NOTES
Dx: work conditioning s/p L quad tendon repair 1/24/2017        Authorized # of Visits:  10 sessions         Next MD visit: none scheduled  Fall Risk: standard         Precautions: n/a             Subjective: Reports general fatigue after yesterday ssessio

## 2017-06-15 ENCOUNTER — OFFICE VISIT (OUTPATIENT)
Dept: PHYSICAL THERAPY | Age: 62
End: 2017-06-15
Attending: INTERNAL MEDICINE
Payer: OTHER MISCELLANEOUS

## 2017-06-15 PROCEDURE — 97014 ELECTRIC STIMULATION THERAPY: CPT

## 2017-06-15 PROCEDURE — 97545 WORK HARDENING: CPT

## 2017-06-15 NOTE — PROGRESS NOTES
Dx: work conditioning s/p L quad tendon repair 1/24/2017        Authorized # of Visits:  10 sessions         Next MD visit: none scheduled  Fall Risk: standard         Precautions: n/a             Subjective: Reports general fatigue after yesterday session lifting duties with baggage from floor to knuckle height 40lbs to 50 lbs 20 reps.    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  -Able to ambulate 1 mile or > continuously to b

## 2017-06-19 ENCOUNTER — OFFICE VISIT (OUTPATIENT)
Dept: PHYSICAL THERAPY | Age: 62
End: 2017-06-19
Attending: INTERNAL MEDICINE
Payer: OTHER MISCELLANEOUS

## 2017-06-19 PROCEDURE — 97546 WORK HARDENING ADD-ON: CPT

## 2017-06-19 PROCEDURE — 97014 ELECTRIC STIMULATION THERAPY: CPT

## 2017-06-19 PROCEDURE — 97545 WORK HARDENING: CPT

## 2017-06-19 NOTE — PROGRESS NOTES
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   -Improve L quadriceps strength 4+/5 or > so he can perform lifting duties with baggage from floor to knuckle height 40lbs to 50 lbs 20 reps.

## 2017-06-20 ENCOUNTER — OFFICE VISIT (OUTPATIENT)
Dept: PHYSICAL THERAPY | Age: 62
End: 2017-06-20
Attending: INTERNAL MEDICINE
Payer: OTHER MISCELLANEOUS

## 2017-06-20 PROCEDURE — 97546 WORK HARDENING ADD-ON: CPT

## 2017-06-20 PROCEDURE — 97545 WORK HARDENING: CPT

## 2017-06-20 NOTE — PROGRESS NOTES
Dx: work conditioning s/p L quad tendon repair 1/24/2017        Authorized # of Visits:  10 sessions         Next MD visit: none scheduled  Fall Risk: standard         Precautions: n/a             Subjective: No knee complaints other than usual c/o some we overall appears to be chronically deconditioned. Has no form of regular exercise or recreational activity. Has been encouraged to increase activity such as with bicycle riding but has not initiated.  He will most likely need a long term exercise program t

## 2017-06-21 ENCOUNTER — OFFICE VISIT (OUTPATIENT)
Dept: PHYSICAL THERAPY | Age: 62
End: 2017-06-21
Attending: INTERNAL MEDICINE
Payer: OTHER MISCELLANEOUS

## 2017-06-21 PROCEDURE — 97545 WORK HARDENING: CPT

## 2017-06-21 NOTE — PROGRESS NOTES
Dx: work conditioning s/p L quad tendon repair 1/24/2017        Authorized # of Visits:  10 sessions         Next MD visit: none scheduled  Fall Risk: standard         Precautions: n/a             Subjective: No knee complaints other than usual c/o some we and knee ache/pain at both knees. Has crepitus R knee, reports hx of this. Poor control due to decreased quad strength, lumbo pelvic stability. HEP issued with HO and bands to improve LE strength and endurance.   Quickly fatigues, overall appears to be chr

## 2017-06-22 ENCOUNTER — OFFICE VISIT (OUTPATIENT)
Dept: PHYSICAL THERAPY | Age: 62
End: 2017-06-22
Attending: INTERNAL MEDICINE
Payer: OTHER MISCELLANEOUS

## 2017-06-22 PROCEDURE — 97545 WORK HARDENING: CPT

## 2017-06-22 NOTE — PROGRESS NOTES
Dx: work conditioning s/p L quad tendon repair 1/24/2017        Authorized # of Visits:  10 sessions         Next MD visit: none scheduled  Fall Risk: standard         Precautions: n/a             Subjective: Reports general body soreness from yesterday's to ambulate 1 mile. Deferred lifting ex's due to poor tolerance for full program, deconditioned. Will alternate lifting, work simulated ex's with fitness center PRE machines due to this.   Hip strength ~ 4+/5, knee at ~ 4/5 with slight end range lag which

## 2017-07-03 ENCOUNTER — APPOINTMENT (OUTPATIENT)
Dept: PHYSICAL THERAPY | Age: 62
End: 2017-07-03
Attending: INTERNAL MEDICINE
Payer: COMMERCIAL

## 2017-07-05 ENCOUNTER — OFFICE VISIT (OUTPATIENT)
Dept: PHYSICAL THERAPY | Age: 62
End: 2017-07-05
Attending: INTERNAL MEDICINE
Payer: COMMERCIAL

## 2017-07-05 PROCEDURE — 97545 WORK HARDENING: CPT

## 2017-07-05 NOTE — PROGRESS NOTES
Dx: work conditioning s/p L quad tendon repair 1/24/2017        Authorized # of Visits:  10 sessions         Next MD visit: none scheduled  Fall Risk: standard         Precautions: n/a       Progress Report  9 of 10 sessions completed          Subjective: repetitions.  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  Partially met  -Able to ambulate 1 mile or > continuously to be able to perform continuous duties monitoring and parti

## 2017-07-06 ENCOUNTER — OFFICE VISIT (OUTPATIENT)
Dept: PHYSICAL THERAPY | Age: 62
End: 2017-07-06
Attending: INTERNAL MEDICINE
Payer: COMMERCIAL

## 2017-07-06 PROCEDURE — 97545 WORK HARDENING: CPT

## 2017-07-06 NOTE — PROGRESS NOTES
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 Met  -Improve L quadriceps strength 4+/5 or > so he can perform lifting duties with baggage from floor to knuckle height 40lbs to 50 lbs 20 reps.  Not met for repetitions.  -Improve L hip strength to 4+/5 or > so pt center training record    90 min               Body mechanics and lifting assessment  30 min  See training record for clinic  60 min See fitness center training record    70 min See fitness center training record    90 min

## 2017-07-10 ENCOUNTER — APPOINTMENT (OUTPATIENT)
Dept: PHYSICAL THERAPY | Age: 62
End: 2017-07-10
Payer: COMMERCIAL

## 2017-07-11 ENCOUNTER — APPOINTMENT (OUTPATIENT)
Dept: PHYSICAL THERAPY | Age: 62
End: 2017-07-11
Payer: COMMERCIAL

## 2017-07-17 ENCOUNTER — APPOINTMENT (OUTPATIENT)
Dept: PHYSICAL THERAPY | Age: 62
End: 2017-07-17
Payer: COMMERCIAL

## 2017-07-17 ENCOUNTER — TELEPHONE (OUTPATIENT)
Dept: PHYSICAL THERAPY | Age: 62
End: 2017-07-17

## 2017-07-24 ENCOUNTER — OFFICE VISIT (OUTPATIENT)
Dept: PHYSICAL THERAPY | Age: 62
End: 2017-07-24
Attending: INTERNAL MEDICINE
Payer: COMMERCIAL

## 2017-07-24 PROCEDURE — 97545 WORK HARDENING: CPT

## 2017-07-25 ENCOUNTER — APPOINTMENT (OUTPATIENT)
Dept: PHYSICAL THERAPY | Age: 62
End: 2017-07-25
Payer: COMMERCIAL

## 2017-07-25 ENCOUNTER — OFFICE VISIT (OUTPATIENT)
Dept: PHYSICAL THERAPY | Age: 62
End: 2017-07-25
Attending: INTERNAL MEDICINE
Payer: COMMERCIAL

## 2017-07-25 PROCEDURE — 97545 WORK HARDENING: CPT

## 2017-07-25 NOTE — PROGRESS NOTES
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  Met   -Pt able to l

## 2017-07-26 ENCOUNTER — OFFICE VISIT (OUTPATIENT)
Dept: PHYSICAL THERAPY | Age: 62
End: 2017-07-26
Attending: INTERNAL MEDICINE
Payer: COMMERCIAL

## 2017-07-26 PROCEDURE — 97545 WORK HARDENING: CPT

## 2017-07-26 NOTE — PROGRESS NOTES
Dx: work conditioning s/p L quad tendon repair 1/24/2017        Authorized # of Visits:  20 sessions         Next MD visit: none scheduled  Fall Risk: standard         Precautions: n/a         Subjective: No pain with returning to program.  Overall mild mu 5-10%.     Charges: WC initial 2 hours      Total Timed Treatment: 120 min  Total Treatment Time: 120 min    Date: 6/19  Tx#: 5/ Date: 6/20  Tx#: 6/ Date: 6/21  Tx#: 7/ 6/22  tx 8 7/5  tx 9 7/6  tx 10 7/24  11 of 20     NMES, 10 on, 20 off, 2 channel, L qu

## 2017-07-26 NOTE — PROGRESS NOTES
Dx: work conditioning s/p L quad tendon repair 1/24/2017        Authorized # of Visits:  20 sessions         Next MD visit: none scheduled  Fall Risk: standard         Precautions: n/a         Subjective: No pain with returning to program.  Overall mild mu Charges: WC initial 2 hours      Total Timed Treatment: 120 min  Total Treatment Time: 120 min    Date: 6/19  Tx#: 5/ Date: 6/20  Tx#: 6/ Date: 6/21  Tx#: 7/ 6/22  tx 8 7/5  tx 9 7/6  tx 10 7/24 11 of 20 7/25 12/20 7/26 13/20   NMES, 10 on, 20 off,

## 2017-07-27 ENCOUNTER — APPOINTMENT (OUTPATIENT)
Dept: PHYSICAL THERAPY | Age: 62
End: 2017-07-27
Attending: INTERNAL MEDICINE
Payer: COMMERCIAL

## 2017-07-27 ENCOUNTER — OFFICE VISIT (OUTPATIENT)
Dept: PHYSICAL THERAPY | Age: 62
End: 2017-07-27
Attending: INTERNAL MEDICINE
Payer: COMMERCIAL

## 2017-07-27 PROCEDURE — 97545 WORK HARDENING: CPT

## 2017-07-27 NOTE — PROGRESS NOTES
Dx: work conditioning s/p L quad tendon repair 1/24/2017        Authorized # of Visits:  20 sessions         Next MD visit: none scheduled  Fall Risk: standard         Precautions: n/a         Subjective:  Less fatigue with current program, strength improv 6/21  Tx#: 7/ 6/22  tx 8 7/5  tx 9 7/6  tx 10 7/24 11 of 20 7/25 12/20 7/26 13/20 7/27 14/20     NMES, 10 on, 20 off, 2 channel, L quad, ramp to 24 ma with quad sets and TKE 6 inch bolster, SLR  30 minutes See training record for clinic  70 min See tra

## 2017-07-31 ENCOUNTER — OFFICE VISIT (OUTPATIENT)
Dept: PHYSICAL THERAPY | Age: 62
End: 2017-07-31
Attending: INTERNAL MEDICINE
Payer: COMMERCIAL

## 2017-07-31 ENCOUNTER — APPOINTMENT (OUTPATIENT)
Dept: PHYSICAL THERAPY | Age: 62
End: 2017-07-31
Payer: COMMERCIAL

## 2017-07-31 PROCEDURE — 97545 WORK HARDENING: CPT

## 2017-07-31 NOTE — PROGRESS NOTES
Dx: work conditioning s/p L quad tendon repair 1/24/2017        Authorized # of Visits:  20 sessions         Next MD visit: none scheduled  Fall Risk: standard         Precautions: n/a         Subjective: Leg strength improving.   More active each week with min  Total Treatment Time: 135 min    Date: 6/19  Tx#: 5/ Date: 6/20  Tx#: 6/ Date: 6/21  Tx#: 7/ 6/22  tx 8 7/5  tx 9 7/6  tx 10 7/24  11 of 20 7/25  12/20 7/26  13/20 7/27  14/20 7/31  15/20    NMES, 10 on, 20 off, 2 channel, L quad, ramp to 24 ma with q

## 2017-08-01 ENCOUNTER — OFFICE VISIT (OUTPATIENT)
Dept: PHYSICAL THERAPY | Age: 62
End: 2017-08-01
Attending: INTERNAL MEDICINE
Payer: OTHER MISCELLANEOUS

## 2017-08-01 PROCEDURE — 97546 WORK HARDENING ADD-ON: CPT

## 2017-08-01 PROCEDURE — 97545 WORK HARDENING: CPT

## 2017-08-01 NOTE — PROGRESS NOTES
Dx: work conditioning s/p L quad tendon repair 1/24/2017        Authorized # of Visits:  20 sessions         Next MD visit: none scheduled  Fall Risk: standard         Precautions: n/a       Subjective: Leg strength improving.   More active each week with w to lower 40 lbs from shoulder height to floor. Met  -Return to full duty work. Plan: MD lloyd/amanda in 2 days.   .      Charges: WC initial 2 hours plus 1 hour      Total Timed Treatment: 170 min  Total Treatment Time: 170 min      Date: 6/19  Tx#: 5/ Date: 6/ min See fitness center training record    100 min See fitness center training record    100 min See fitness center training record    115 min See fitness center training record    115 min

## 2017-08-02 ENCOUNTER — OFFICE VISIT (OUTPATIENT)
Dept: PHYSICAL THERAPY | Age: 62
End: 2017-08-02
Attending: INTERNAL MEDICINE
Payer: OTHER MISCELLANEOUS

## 2017-08-02 PROCEDURE — 97545 WORK HARDENING: CPT

## 2017-08-02 PROCEDURE — 97546 WORK HARDENING ADD-ON: CPT

## 2017-08-02 NOTE — PROGRESS NOTES
Dx: work conditioning s/p L quad tendon repair 1/24/2017        Authorized # of Visits:  20 sessions         Next MD visit: none scheduled  Fall Risk: standard         Precautions: n/a       Subjective: Leg strength improving.   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 Met   -Pt able to lower 40 lbs from shoulder height to floor. Met  -Return to full duty work.       Plan: Progress report h box step  X 15 2 sets L quad ecc lowering from 4 inch box step  X 18 2 sets L quad ecc lowering from 6 inch box step  X 10 3 sets 40 lb box lifts from floor to chest height, x 20 reps.   deferred                      See fitness center training record    90

## 2017-08-03 ENCOUNTER — APPOINTMENT (OUTPATIENT)
Dept: PHYSICAL THERAPY | Age: 62
End: 2017-08-03
Payer: OTHER MISCELLANEOUS

## 2017-08-08 ENCOUNTER — OFFICE VISIT (OUTPATIENT)
Dept: PHYSICAL THERAPY | Age: 62
End: 2017-08-08
Attending: INTERNAL MEDICINE
Payer: OTHER MISCELLANEOUS

## 2017-08-08 PROCEDURE — 97546 WORK HARDENING ADD-ON: CPT

## 2017-08-08 PROCEDURE — 97545 WORK HARDENING: CPT

## 2017-08-08 NOTE — PROGRESS NOTES
Dx: work conditioning s/p L quad tendon repair 1/24/2017        Authorized # of Visits:  20 sessions         Next MD visit: none scheduled  Fall Risk: standard         Precautions: n/a       Subjective: Saw surgeon.   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  Met   -Pt able to lower 40 l center training record    90 min L quad ecc lowering from 4 inch box step  X 10 3 sets L quad ecc lowering from 4 inch box step  X 10 3 sets L quad ecc lowering from 4 inch box step  X 15 2 sets L quad ecc lowering from 4 inch box step  X 18 2 sets L quad

## 2017-08-09 ENCOUNTER — OFFICE VISIT (OUTPATIENT)
Dept: PHYSICAL THERAPY | Age: 62
End: 2017-08-09
Attending: INTERNAL MEDICINE
Payer: OTHER MISCELLANEOUS

## 2017-08-09 PROCEDURE — 97546 WORK HARDENING ADD-ON: CPT

## 2017-08-09 PROCEDURE — 97545 WORK HARDENING: CPT

## 2017-08-09 NOTE — PROGRESS NOTES
Dx: work conditioning s/p L quad tendon repair 1/24/2017        Authorized # of Visits:  20 sessions         Next MD visit: none scheduled  Fall Risk: standard         Precautions: n/a       Work conditioning Discharge Report  19 of 20 sessions completed 14th, met. Plan: Pt has completed the work condition program scheduled sessions. Will not be attending 20th session due to schedule conflict.      Charges: WC initial 2 hours plus 1 hour      Total Timed Treatment: 150 min  Total Treatment Time: 150 mi step  X 10 3 sets L quad ecc lowering from 4 inch box step  X 15 2 sets L quad ecc lowering from 4 inch box step  X 18 2 sets L quad ecc lowering from 6 inch box step  X 10 3 sets 40 lb box lifts from floor to chest height, x 20 reps.   deferred See fitness

## 2017-08-17 RX ORDER — AMLODIPINE BESYLATE 5 MG/1
TABLET ORAL
Qty: 90 TABLET | Refills: 1 | Status: SHIPPED | OUTPATIENT
Start: 2017-08-17 | End: 2018-02-14

## 2017-08-17 RX ORDER — OLMESARTAN MEDOXOMIL AND HYDROCHLOROTHIAZIDE 40/25 40; 25 MG/1; MG/1
TABLET ORAL
Qty: 90 TABLET | Refills: 1 | Status: SHIPPED | OUTPATIENT
Start: 2017-08-17 | End: 2018-02-14

## 2017-10-03 ENCOUNTER — OFFICE VISIT (OUTPATIENT)
Dept: INTERNAL MEDICINE CLINIC | Facility: CLINIC | Age: 62
End: 2017-10-03

## 2017-10-03 VITALS
TEMPERATURE: 98 F | HEIGHT: 72 IN | BODY MASS INDEX: 42.46 KG/M2 | WEIGHT: 313.5 LBS | DIASTOLIC BLOOD PRESSURE: 82 MMHG | RESPIRATION RATE: 20 BRPM | SYSTOLIC BLOOD PRESSURE: 124 MMHG | HEART RATE: 80 BPM | OXYGEN SATURATION: 96 %

## 2017-10-03 DIAGNOSIS — S76.112D QUADRICEPS TENDON RUPTURE, LEFT, SUBSEQUENT ENCOUNTER: ICD-10-CM

## 2017-10-03 DIAGNOSIS — E66.9 OBESITY, UNSPECIFIED OBESITY SEVERITY, UNSPECIFIED OBESITY TYPE: ICD-10-CM

## 2017-10-03 DIAGNOSIS — M79.676 PAIN OF TOE, UNSPECIFIED LATERALITY: ICD-10-CM

## 2017-10-03 DIAGNOSIS — J06.9 UPPER RESPIRATORY TRACT INFECTION, UNSPECIFIED TYPE: ICD-10-CM

## 2017-10-03 DIAGNOSIS — Z12.5 SCREENING FOR PROSTATE CANCER: ICD-10-CM

## 2017-10-03 DIAGNOSIS — I10 ESSENTIAL HYPERTENSION WITH GOAL BLOOD PRESSURE LESS THAN 140/90: Primary | ICD-10-CM

## 2017-10-03 DIAGNOSIS — J45.20 MILD INTERMITTENT ASTHMA WITHOUT COMPLICATION: ICD-10-CM

## 2017-10-03 PROCEDURE — 99214 OFFICE O/P EST MOD 30 MIN: CPT | Performed by: INTERNAL MEDICINE

## 2017-10-03 PROCEDURE — 99212 OFFICE O/P EST SF 10 MIN: CPT | Performed by: INTERNAL MEDICINE

## 2017-10-03 RX ORDER — AZITHROMYCIN 250 MG/1
TABLET, FILM COATED ORAL
Qty: 6 TABLET | Refills: 0 | Status: SHIPPED | OUTPATIENT
Start: 2017-10-03 | End: 2017-10-07

## 2017-10-03 NOTE — PROGRESS NOTES
HPI:    Patient ID: Lele Hernandez is a 63year old male.     HPI   Patient returns to the office today to discuss chronic medical issues which include Patient Active Problem List:     Asthma     Essential hypertension     Obesity     Quadriceps tendon ruptur CAD   • Heart Disorder Neg    • Lipids Neg       Smoking status: Never Smoker                                                              Smokeless tobacco: Never Used                      Alcohol use:  Yes              Comment: occ             Review of S HFA) 108 (90 BASE) MCG/ACT Inhalation Aero Soln Inhale 2 puffs into the lungs every 4 (four) hours as needed.  Disp: 1 Inhaler Rfl: 0   Fluticasone Propionate HFA (FLOVENT HFA) 110 MCG/ACT Inhalation Aerosol Inhale 2 puffs into the lungs 2 (two) times daily interphalangeal joint. No erythema, warmth, or tenderness. Lymphadenopathy:     He has no cervical adenopathy. Neurological: He is alert and oriented to person, place, and time. Skin: Skin is warm and dry. No rash noted.    Psychiatric: He has a adilene Disp Refills    azithromycin 250 MG Oral Tab 6 tablet 0      Sig: Take two tablets by mouth today, then one daily. Note reviewed and approved. I did the history and exam with help of PA. PA generated the note under my guidance.     [facility]

## 2017-10-06 ENCOUNTER — LAB ENCOUNTER (OUTPATIENT)
Dept: LAB | Facility: HOSPITAL | Age: 62
End: 2017-10-06
Attending: INTERNAL MEDICINE
Payer: COMMERCIAL

## 2017-10-06 DIAGNOSIS — Z12.5 SCREENING FOR PROSTATE CANCER: ICD-10-CM

## 2017-10-06 DIAGNOSIS — M79.676 PAIN OF TOE, UNSPECIFIED LATERALITY: ICD-10-CM

## 2017-10-06 DIAGNOSIS — I10 ESSENTIAL HYPERTENSION WITH GOAL BLOOD PRESSURE LESS THAN 140/90: ICD-10-CM

## 2017-10-06 PROCEDURE — 84443 ASSAY THYROID STIM HORMONE: CPT

## 2017-10-06 PROCEDURE — 80061 LIPID PANEL: CPT

## 2017-10-06 PROCEDURE — 85060 BLOOD SMEAR INTERPRETATION: CPT

## 2017-10-06 PROCEDURE — 85025 COMPLETE CBC W/AUTO DIFF WBC: CPT

## 2017-10-06 PROCEDURE — 82607 VITAMIN B-12: CPT

## 2017-10-06 PROCEDURE — 36415 COLL VENOUS BLD VENIPUNCTURE: CPT

## 2017-10-06 PROCEDURE — 80053 COMPREHEN METABOLIC PANEL: CPT

## 2017-10-06 PROCEDURE — 84550 ASSAY OF BLOOD/URIC ACID: CPT

## 2017-10-12 RX ORDER — METOPROLOL SUCCINATE 100 MG/1
TABLET, EXTENDED RELEASE ORAL
Qty: 90 TABLET | Refills: 0 | Status: SHIPPED | OUTPATIENT
Start: 2017-10-12 | End: 2018-01-06

## 2017-10-12 NOTE — TELEPHONE ENCOUNTER
Rx request for Metoprolol Succinate ER 100mg, PASSED Hypertension Protocol. Rx filled per protocol.     Hypertensive Medications  Protocol Criteria:  · Appointment scheduled in the past 6 months or in the next 3 months  · BMP or CMP in the past 12 months  ·

## 2017-10-29 ENCOUNTER — HOSPITAL ENCOUNTER (EMERGENCY)
Facility: HOSPITAL | Age: 62
Discharge: STILL ADMITTED OR ADMINISTRATIVE | End: 2017-10-30
Payer: COMMERCIAL

## 2017-10-29 ENCOUNTER — HOSPITAL ENCOUNTER (OUTPATIENT)
Age: 62
Discharge: ANOTHER HOSPITAL | End: 2017-10-29
Attending: FAMILY MEDICINE
Payer: COMMERCIAL

## 2017-10-29 ENCOUNTER — APPOINTMENT (OUTPATIENT)
Dept: GENERAL RADIOLOGY | Age: 62
End: 2017-10-29
Attending: FAMILY MEDICINE
Payer: COMMERCIAL

## 2017-10-29 VITALS
HEART RATE: 52 BPM | BODY MASS INDEX: 41 KG/M2 | DIASTOLIC BLOOD PRESSURE: 88 MMHG | TEMPERATURE: 98 F | SYSTOLIC BLOOD PRESSURE: 136 MMHG | WEIGHT: 300 LBS | OXYGEN SATURATION: 98 % | RESPIRATION RATE: 18 BRPM

## 2017-10-29 DIAGNOSIS — R42 DIZZINESS: Primary | ICD-10-CM

## 2017-10-29 DIAGNOSIS — R53.83 OTHER FATIGUE: ICD-10-CM

## 2017-10-29 DIAGNOSIS — R07.9 CHEST PAIN OF UNCERTAIN ETIOLOGY: ICD-10-CM

## 2017-10-29 PROCEDURE — 93005 ELECTROCARDIOGRAM TRACING: CPT

## 2017-10-29 PROCEDURE — 71020 XR CHEST PA + LAT CHEST (CPT=71020): CPT | Performed by: FAMILY MEDICINE

## 2017-10-29 PROCEDURE — 36415 COLL VENOUS BLD VENIPUNCTURE: CPT

## 2017-10-29 PROCEDURE — 80047 BASIC METABLC PNL IONIZED CA: CPT

## 2017-10-29 PROCEDURE — 99215 OFFICE O/P EST HI 40 MIN: CPT

## 2017-10-29 PROCEDURE — 93010 ELECTROCARDIOGRAM REPORT: CPT

## 2017-10-29 PROCEDURE — 85025 COMPLETE CBC W/AUTO DIFF WBC: CPT | Performed by: FAMILY MEDICINE

## 2017-10-29 PROCEDURE — 84484 ASSAY OF TROPONIN QUANT: CPT

## 2017-10-29 RX ORDER — ACETAMINOPHEN 500 MG
1000 TABLET ORAL ONCE
Status: COMPLETED | OUTPATIENT
Start: 2017-10-29 | End: 2017-10-29

## 2017-10-29 NOTE — ED INITIAL ASSESSMENT (HPI)
Pt. Started on Friday with tiredness, body aches, chills. Headache on top of head. Post nasal drip more in the mornings (clearing the throat a lot). No specific fever. Ears feels tickle. Also c/o some dizziness. Denies diarrhea.

## 2017-10-29 NOTE — ED PROVIDER NOTES
Patient Seen in: [facility]    History   Patient presents with:   Body ache and/or chills  Headache (neurologic)  Post Nasal Drip  Dizziness (neurologic)    Stated Complaint: HA/BODY ACHE/CHILLS/SHORTNESS OF BREATH    HPI    Amador Smokeless tobacco: Never Used                      Alcohol use: Yes              Comment: occ      Review of Systems    Positive for stated complaint: HA/BODY ACHE/CHILLS/SHORTNESS OF BREATH  Other systems are as noted in HPI.   Constitutional and ============================================================  ED Course  ------------------------------------------------------------  MDM     Patient with atypical chest pains, upper back pain, generalized fatigue, dizziness, shortness of breath for 3

## 2017-10-29 NOTE — ED NOTES
Report to Justina TELLEZ at [facility] [address] [place] [address]

## 2017-11-13 RX ORDER — METOPROLOL SUCCINATE 100 MG/1
TABLET, EXTENDED RELEASE ORAL
Qty: 90 TABLET | OUTPATIENT
Start: 2017-11-13

## 2017-11-13 NOTE — TELEPHONE ENCOUNTER
Hypertensive Medications  Protocol Criteria:  · Appointment scheduled in the past 6 months or in the next 3 months  · BMP or CMP in the past 12 months  · Creatinine result < 2  Recent Outpatient Visits            1 month ago Essential hypertension with Zahra Gorman

## 2018-01-08 RX ORDER — METOPROLOL SUCCINATE 100 MG/1
TABLET, EXTENDED RELEASE ORAL
Qty: 90 TABLET | Refills: 0 | Status: SHIPPED | OUTPATIENT
Start: 2018-01-08 | End: 2018-04-09

## 2018-01-09 ENCOUNTER — HOSPITAL ENCOUNTER (OUTPATIENT)
Age: 63
Discharge: HOME | End: 2018-01-09
Attending: FAMILY MEDICINE
Payer: COMMERCIAL

## 2018-01-09 VITALS
OXYGEN SATURATION: 100 % | DIASTOLIC BLOOD PRESSURE: 78 MMHG | SYSTOLIC BLOOD PRESSURE: 146 MMHG | RESPIRATION RATE: 20 BRPM | BODY MASS INDEX: 40.63 KG/M2 | HEART RATE: 56 BPM | HEIGHT: 72 IN | TEMPERATURE: 98 F | WEIGHT: 300 LBS

## 2018-01-09 DIAGNOSIS — B34.9 VIRAL SYNDROME: Primary | ICD-10-CM

## 2018-01-09 PROCEDURE — 99214 OFFICE O/P EST MOD 30 MIN: CPT

## 2018-01-09 PROCEDURE — 99213 OFFICE O/P EST LOW 20 MIN: CPT

## 2018-01-09 RX ORDER — FLUTICASONE PROPIONATE 50 MCG
2 SPRAY, SUSPENSION (ML) NASAL DAILY
Qty: 16 G | Refills: 0 | Status: SHIPPED | OUTPATIENT
Start: 2018-01-09 | End: 2018-02-08

## 2018-01-09 NOTE — TELEPHONE ENCOUNTER
Hypertensive Medications: Refilled per protocol    Protocol Criteria:  · Appointment scheduled in the past 6 months or in the next 3 months  · BMP or CMP in the past 12 months  · Creatinine result < 2  Recent Outpatient Visits            3 months ago Marjorie

## 2018-01-09 NOTE — ED PROVIDER NOTES
Patient Seen in: [facility]    History   Patient presents with:  Cough/URI    Stated Complaint: sinus press, no energy with some sneezing since sunday    HPI     82-year-old gentleman with a history of atrial fibrillation and hy acute distress. HEENT: No sinus tenderness to palpation. Bilateral TMs are clear. MMM, Posterior pharynx is clear. No erythema. No exudate. Bilateral nares are boggy and erythematous. Neck: Supple, NT, FROM. No meningeal signs. LAD: No lymphadenopathy.

## 2018-02-14 NOTE — TELEPHONE ENCOUNTER
Amlodipine; Olmesartan Medoxomil/HCTZ;  Toprol XL---[facility] requesting 90 day supply for each rx    Current Outpatient Prescriptions:   •  METOPROLOL SUCCINATE  MG Oral Tablet 24 Hr, TAKE 1 TABLET BY MOUTH  DAILY, Disp: 90 tablet, Rfl: 0  •  AMLODIPI

## 2018-02-16 RX ORDER — AMLODIPINE BESYLATE 5 MG/1
TABLET ORAL
Qty: 90 TABLET | Refills: 0 | Status: SHIPPED | OUTPATIENT
Start: 2018-02-16 | End: 2018-05-15

## 2018-02-16 RX ORDER — OLMESARTAN MEDOXOMIL AND HYDROCHLOROTHIAZIDE 40/25 40; 25 MG/1; MG/1
TABLET ORAL
Qty: 90 TABLET | Refills: 0 | Status: SHIPPED | OUTPATIENT
Start: 2018-02-16 | End: 2018-05-15

## 2018-02-16 NOTE — TELEPHONE ENCOUNTER
Hypertensive Medications  Protocol Criteria:  · Appointment scheduled in the past 6 months or in the next 3 months  · BMP or CMP in the past 12 months  · Creatinine result < 2  Recent Outpatient Visits            4 months ago Essential hypertension with go

## 2018-03-23 ENCOUNTER — APPOINTMENT (OUTPATIENT)
Dept: CT IMAGING | Age: 63
End: 2018-03-23
Attending: EMERGENCY MEDICINE
Payer: COMMERCIAL

## 2018-03-23 ENCOUNTER — HOSPITAL ENCOUNTER (OUTPATIENT)
Age: 63
Discharge: HOME | End: 2018-03-23
Payer: COMMERCIAL

## 2018-03-23 VITALS
SYSTOLIC BLOOD PRESSURE: 142 MMHG | DIASTOLIC BLOOD PRESSURE: 81 MMHG | OXYGEN SATURATION: 98 % | RESPIRATION RATE: 12 BRPM | HEART RATE: 58 BPM | TEMPERATURE: 97 F

## 2018-03-23 DIAGNOSIS — R42 VERTIGO: Primary | ICD-10-CM

## 2018-03-23 LAB
#LYMPHOCYTE IC: 1.3 X10ˆ3/UL (ref 0.9–3.2)
#MXD IC: 0.3 X10ˆ3/UL (ref 0.1–1)
#NEUTROPHIL IC: 4.5 X10ˆ3/UL (ref 1.3–6.7)
ATRIAL RATE: 66 BPM
CREAT SERPL-MCNC: 0.9 MG/DL (ref 0.7–1.3)
GLUCOSE BLD-MCNC: 101 MG/DL (ref 65–99)
GLUCOSE BLD-MCNC: 117 MG/DL (ref 70–99)
HCT IC: 47.1 % (ref 37–54)
HGB IC: 14.3 G/DL (ref 13–17)
ISTAT BLOOD GAS TCO2: 28 MMOL/L (ref 22–32)
ISTAT BUN: 13 MG/DL (ref 8–20)
ISTAT CHLORIDE: 99 MMOL/L (ref 101–111)
ISTAT HEMATOCRIT: 48 % (ref 37–53)
ISTAT IONIZED CALCIUM: 1.16 MMOL/L (ref 1.12–1.32)
ISTAT POTASSIUM: 4 MMOL/L (ref 3.6–5.1)
ISTAT SODIUM: 140 MMOL/L (ref 136–144)
LYMPHOCYTES NFR BLD AUTO: 21 %
MCH IC: 23.4 PG (ref 27–33.2)
MCHC IC: 30.4 G/DL (ref 31–37)
MCV IC: 77.2 FL (ref 80–99)
MIXED CELL %: 4.8 %
NEUTROPHILS NFR BLD AUTO: 74.2 %
P AXIS: 38 DEGREES
P-R INTERVAL: 248 MS
PLT IC: 323 X10ˆ3/UL (ref 150–450)
Q-T INTERVAL: 422 MS
QRS DURATION: 102 MS
QTC CALCULATION (BEZET): 442 MS
R AXIS: 30 DEGREES
RBC IC: 6.1 X10ˆ6/UL (ref 4.3–5.7)
T AXIS: -7 DEGREES
VENTRICULAR RATE: 66 BPM
WBC IC: 6.1 X10ˆ3/UL (ref 4–13)

## 2018-03-23 PROCEDURE — 96374 THER/PROPH/DIAG INJ IV PUSH: CPT

## 2018-03-23 PROCEDURE — 70450 CT HEAD/BRAIN W/O DYE: CPT | Performed by: EMERGENCY MEDICINE

## 2018-03-23 PROCEDURE — 93010 ELECTROCARDIOGRAM REPORT: CPT

## 2018-03-23 PROCEDURE — 96361 HYDRATE IV INFUSION ADD-ON: CPT

## 2018-03-23 PROCEDURE — 93005 ELECTROCARDIOGRAM TRACING: CPT

## 2018-03-23 PROCEDURE — 99215 OFFICE O/P EST HI 40 MIN: CPT

## 2018-03-23 PROCEDURE — 85025 COMPLETE CBC W/AUTO DIFF WBC: CPT | Performed by: NURSE PRACTITIONER

## 2018-03-23 PROCEDURE — 82962 GLUCOSE BLOOD TEST: CPT

## 2018-03-23 PROCEDURE — 80047 BASIC METABLC PNL IONIZED CA: CPT

## 2018-03-23 PROCEDURE — 96375 TX/PRO/DX INJ NEW DRUG ADDON: CPT

## 2018-03-23 RX ORDER — ONDANSETRON 2 MG/ML
4 INJECTION INTRAMUSCULAR; INTRAVENOUS ONCE
Status: COMPLETED | OUTPATIENT
Start: 2018-03-23 | End: 2018-03-23

## 2018-03-23 RX ORDER — SODIUM CHLORIDE 9 MG/ML
1000 INJECTION, SOLUTION INTRAVENOUS ONCE
Status: COMPLETED | OUTPATIENT
Start: 2018-03-23 | End: 2018-03-23

## 2018-03-23 RX ORDER — LORAZEPAM 2 MG/ML
1 INJECTION INTRAMUSCULAR ONCE
Status: COMPLETED | OUTPATIENT
Start: 2018-03-23 | End: 2018-03-23

## 2018-03-23 RX ORDER — MECLIZINE HCL 12.5 MG/1
12.5 TABLET ORAL ONCE
Status: DISCONTINUED | OUTPATIENT
Start: 2018-03-23 | End: 2018-03-23

## 2018-03-23 RX ORDER — MECLIZINE HYDROCHLORIDE 25 MG/1
25 TABLET ORAL 3 TIMES DAILY PRN
Qty: 21 TABLET | Refills: 0 | Status: SHIPPED | OUTPATIENT
Start: 2018-03-23 | End: 2018-04-30

## 2018-03-23 RX ORDER — DIAZEPAM 5 MG/1
5 TABLET ORAL 3 TIMES DAILY PRN
Qty: 10 TABLET | Refills: 0 | Status: SHIPPED | OUTPATIENT
Start: 2018-03-23 | End: 2018-04-30

## 2018-03-23 RX ORDER — MECLIZINE HCL 12.5 MG/1
25 TABLET ORAL ONCE
Status: COMPLETED | OUTPATIENT
Start: 2018-03-23 | End: 2018-03-23

## 2018-03-23 RX ORDER — ONDANSETRON 4 MG/1
4 TABLET, ORALLY DISINTEGRATING ORAL EVERY 4 HOURS PRN
Qty: 10 TABLET | Refills: 0 | Status: SHIPPED | OUTPATIENT
Start: 2018-03-23 | End: 2018-03-30

## 2018-03-23 NOTE — ED INITIAL ASSESSMENT (HPI)
Pt presents to the immediate care due to dizziness and nausea starting upon waking. Denies chest pain. Reports mild shortness of breath. Worse after having morning BM. Denies vomiting. States \"I just don't feel right. \" Denies pain.

## 2018-03-23 NOTE — ED NOTES
Pt sat up at side of bed and ambulated into ramsey and back to bed with stand by assist. Pt laying back down, called ride will be here in 30 minutes.

## 2018-03-23 NOTE — ED PROVIDER NOTES
Patient Seen in: [facility]    History   Patient presents with:  Dizziness    Stated Complaint: Koidu 26    HPI  Patient is a 59-year-old male that presents with a spinning sensation that st Const[facility] Wil ) 96.8 °F (36 °C)  Temp src: Oral  SpO2: 97 %  O2 Device: None (Room air)    Current:/81   Pulse 58   Temp (!) 96.8 °F (36 °C) (Oral)   Resp 12   SpO2 98%         Physical Exam   Constitutional: He is oriented to person, place, and time.  He appears POCT ISTAT CHEM8 CARTRIDGE - Abnormal; Notable for the following:     ISTAT Chloride 99 (*)     ISTAT Glucose 117 (*)     All other components within normal limits     EKG    Rate, intervals and axes as noted on EKG Report.   Rate: 66  Rhythm: Sinus Rhyth Nausea. Qty: 10 tablet Refills: 0    Meclizine HCl 25 MG Oral Tab  Take 1 tablet (25 mg total) by mouth 3 (three) times daily as needed.   Qty: 21 tablet Refills: 0    diazepam 5 MG Oral Tab  Take 1 tablet (5 mg total) by mouth 3 (three) times daily as nee

## 2018-03-24 NOTE — ED PROVIDER NOTES
I reviewed that chart and discussed the case. I have examined the patient and noted cranial nerves II through XII intact. Tympanic membranes normal bilaterally. Finger to nose intact bilaterally. Heel-to-shin intact bilaterally.   Patient was given Anti

## 2018-04-11 ENCOUNTER — TELEPHONE (OUTPATIENT)
Dept: INTERNAL MEDICINE CLINIC | Facility: CLINIC | Age: 63
End: 2018-04-11

## 2018-04-11 RX ORDER — METOPROLOL SUCCINATE 100 MG/1
TABLET, EXTENDED RELEASE ORAL
Qty: 90 TABLET | Refills: 0 | Status: SHIPPED | OUTPATIENT
Start: 2018-04-11 | End: 2018-05-15

## 2018-04-11 NOTE — TELEPHONE ENCOUNTER
Chart reviewed. Refills sent per Triage Dept protocol.    Hypertensive Medications  Protocol Criteria:  · Appointment scheduled in the past 6 months or in the next 3 months  · BMP or CMP in the past 12 months  · Creatinine result < 2  Recent Outpatient Visi

## 2018-04-15 ENCOUNTER — APPOINTMENT (OUTPATIENT)
Dept: GENERAL RADIOLOGY | Age: 63
End: 2018-04-15
Attending: PHYSICIAN ASSISTANT
Payer: COMMERCIAL

## 2018-04-15 ENCOUNTER — HOSPITAL ENCOUNTER (OUTPATIENT)
Age: 63
Discharge: HOME | End: 2018-04-15
Payer: COMMERCIAL

## 2018-04-15 VITALS
SYSTOLIC BLOOD PRESSURE: 132 MMHG | DIASTOLIC BLOOD PRESSURE: 80 MMHG | RESPIRATION RATE: 24 BRPM | TEMPERATURE: 99 F | OXYGEN SATURATION: 96 % | HEART RATE: 99 BPM

## 2018-04-15 DIAGNOSIS — J40 BRONCHITIS: Primary | ICD-10-CM

## 2018-04-15 PROCEDURE — 99214 OFFICE O/P EST MOD 30 MIN: CPT

## 2018-04-15 PROCEDURE — 94640 AIRWAY INHALATION TREATMENT: CPT

## 2018-04-15 PROCEDURE — 71046 X-RAY EXAM CHEST 2 VIEWS: CPT | Performed by: PHYSICIAN ASSISTANT

## 2018-04-15 RX ORDER — BENZONATATE 100 MG/1
100 CAPSULE ORAL 3 TIMES DAILY PRN
Qty: 30 CAPSULE | Refills: 0 | Status: SHIPPED | OUTPATIENT
Start: 2018-04-15 | End: 2018-04-30

## 2018-04-15 RX ORDER — ALBUTEROL SULFATE 90 UG/1
2 AEROSOL, METERED RESPIRATORY (INHALATION) EVERY 4 HOURS PRN
Qty: 1 INHALER | Refills: 0 | Status: SHIPPED | OUTPATIENT
Start: 2018-04-15 | End: 2018-05-11

## 2018-04-15 RX ORDER — IPRATROPIUM BROMIDE AND ALBUTEROL SULFATE 2.5; .5 MG/3ML; MG/3ML
3 SOLUTION RESPIRATORY (INHALATION) ONCE
Status: COMPLETED | OUTPATIENT
Start: 2018-04-15 | End: 2018-04-15

## 2018-04-15 RX ORDER — PREDNISONE 20 MG/1
60 TABLET ORAL ONCE
Status: COMPLETED | OUTPATIENT
Start: 2018-04-15 | End: 2018-04-15

## 2018-04-15 RX ORDER — METHYLPREDNISOLONE 4 MG/1
TABLET ORAL
Qty: 1 PACKAGE | Refills: 0 | Status: SHIPPED | OUTPATIENT
Start: 2018-04-15 | End: 2018-04-30

## 2018-04-15 RX ORDER — ALBUTEROL SULFATE 2.5 MG/3ML
2.5 SOLUTION RESPIRATORY (INHALATION) ONCE
Status: COMPLETED | OUTPATIENT
Start: 2018-04-15 | End: 2018-04-15

## 2018-04-15 NOTE — ED PROVIDER NOTES
Patient Seen in: [facility]    History   Patient presents with:  Cough    Stated Complaint: COUGH/CONGESTION/PAIN WHEN COUGHING X 2 DAYS    HPI    CHIEF COMPLAINT: Cough and chest congestion     HISTORY OF PRESENT ILLNESS: Jen Comment: (L) knee muscle surgery    Family history reviewed and is not pertinent to presenting problem.     Smoking status: Never Smoker                                                              Smokeless tobacco: Never Used                      Alcohol COUGH/CONGESTION/PAIN WHEN COUGHING X 2 DAYS  COMPARISON:  NICOLASA MEJIA CHEST PA + LAT CHEST (CPT=[id number]), 10/29/2017, 12:31.  TECHNIQUE:  PA and lateral chest radiographs were obtained.   PATIENT STATED HISTORY: (As transcribed by Technologist)  Patient up        Medications Prescribed:  Discharge Medication List as of 4/15/2018  3:53 PM    START taking these medications    ! !  Albuterol Sulfate  (90 Base) MCG/ACT Inhalation Aero Soln  Inhale 2 puffs into the lungs every 4 (four) hours as needed for

## 2018-04-18 ENCOUNTER — TELEPHONE (OUTPATIENT)
Dept: INTERNAL MEDICINE CLINIC | Facility: CLINIC | Age: 63
End: 2018-04-18

## 2018-04-18 NOTE — TELEPHONE ENCOUNTER
[employer] form for Dr. Randy Collet received in [employer] Signed release, pt paid with . Logged for processing.  NK

## 2018-04-18 NOTE — TELEPHONE ENCOUNTER
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 PATIENT STATES HE WILL PICK THESE FORMS UP AT [facility] OF THE [facility] DEPT WHEN COMPLETED. PT DOES NOT WANT THSES FORMS FAXED TO HIS EMPLOYER. PATIENT PAID THE $25 FEE AT TIME OF DROP OFF.

## 2018-04-21 ENCOUNTER — NURSE TRIAGE (OUTPATIENT)
Dept: INTERNAL MEDICINE CLINIC | Facility: CLINIC | Age: 63
End: 2018-04-21

## 2018-04-21 NOTE — TELEPHONE ENCOUNTER
Action Requested: Summary for Provider     []  Critical Lab, Recommendations Needed  [] Need Additional Advice  [x]   FYI    []   Need Orders  [] Need Medications Sent to Pharmacy  []  Other     SUMMARY: Patient to go to [facility]    Patient states was seen in [facility]

## 2018-04-21 NOTE — TELEPHONE ENCOUNTER
Agree with advice given that patient needs to be evaluated today, immediate care center is appropriate again and he still needs to follow-up with PCP or any other doctor in the office in couple days after visit to immediate care center

## 2018-04-23 NOTE — TELEPHONE ENCOUNTER
Patient stated, much better, finally after 2 weeks feels like this has broken. No wheezing, fever, still has nasal/sinus stuffiness. Advised to call back if needed; he agreed.  Scheduled for PE with Dr Cash Flores 5/19/18 at 11 am.

## 2018-04-23 NOTE — TELEPHONE ENCOUNTER
Dr Marti Greenwood, please advise. Please respond to pool: EM IM [facility] LPN/CMA      During triage follow-up, patient asked status of the FMLA form.

## 2018-04-27 NOTE — TELEPHONE ENCOUNTER
Forms on hold in [employer]. Pts last appt w/JK was in . Current appt needed for FMLA. LM for pt. Upcoming appt 4-30-18. Forms on hold in [employer].  RUSSELL

## 2018-04-30 ENCOUNTER — OFFICE VISIT (OUTPATIENT)
Dept: INTERNAL MEDICINE CLINIC | Facility: CLINIC | Age: 63
End: 2018-04-30

## 2018-04-30 VITALS
TEMPERATURE: 98 F | DIASTOLIC BLOOD PRESSURE: 79 MMHG | WEIGHT: 306 LBS | RESPIRATION RATE: 20 BRPM | BODY MASS INDEX: 41.45 KG/M2 | HEART RATE: 59 BPM | SYSTOLIC BLOOD PRESSURE: 118 MMHG | HEIGHT: 72 IN

## 2018-04-30 DIAGNOSIS — J45.21 MILD INTERMITTENT ASTHMA WITH EXACERBATION: Primary | ICD-10-CM

## 2018-04-30 PROCEDURE — 99212 OFFICE O/P EST SF 10 MIN: CPT | Performed by: PHYSICIAN ASSISTANT

## 2018-04-30 RX ORDER — LATANOPROST 50 UG/ML
1 SOLUTION/ DROPS OPHTHALMIC NIGHTLY
COMMUNITY
Start: 2018-04-06

## 2018-04-30 NOTE — PROGRESS NOTES
HPI:    Patient ID: Patricia Pa is a 63year old male. HPI   Patient with history of hypertension, obesity, and asthma presents today for follow-up from urgent care.   Was seen on 4/15/18 for upper respiratory symptoms including chest congestion and whe for Wheezing.  Disp: 1 Inhaler Rfl: 0   METOPROLOL SUCCINATE  MG Oral Tablet 24 Hr TAKE 1 TABLET BY MOUTH  DAILY Disp: 90 tablet Rfl: 0   AMLODIPINE BESYLATE 5 MG Oral Tab TAKE 1 TABLET BY MOUTH  DAILY Disp: 90 tablet Rfl: 0   OLMESARTAN MEDOXOMIL-HCT range of motion. Neck supple. Cardiovascular: Normal rate, regular rhythm and normal heart sounds. Pulmonary/Chest: Effort normal and breath sounds normal. No respiratory distress. He has no wheezes. He has no rales.    Lymphadenopathy:     He has no c

## 2018-05-01 NOTE — TELEPHONE ENCOUNTER
Form completed. Heidi faxed to [employer] 04/30, pt informed. Scanned, orig & copy mailed to pt.  NK

## 2018-05-11 ENCOUNTER — OFFICE VISIT (OUTPATIENT)
Dept: INTERNAL MEDICINE CLINIC | Facility: CLINIC | Age: 63
End: 2018-05-11

## 2018-05-11 VITALS
WEIGHT: 299.81 LBS | HEIGHT: 72 IN | HEART RATE: 60 BPM | DIASTOLIC BLOOD PRESSURE: 86 MMHG | RESPIRATION RATE: 20 BRPM | TEMPERATURE: 98 F | SYSTOLIC BLOOD PRESSURE: 124 MMHG | BODY MASS INDEX: 40.61 KG/M2

## 2018-05-11 DIAGNOSIS — J06.9 UPPER RESPIRATORY TRACT INFECTION, UNSPECIFIED TYPE: Primary | ICD-10-CM

## 2018-05-11 DIAGNOSIS — J45.21 MILD INTERMITTENT ASTHMA WITH EXACERBATION: ICD-10-CM

## 2018-05-11 PROCEDURE — 99212 OFFICE O/P EST SF 10 MIN: CPT | Performed by: INTERNAL MEDICINE

## 2018-05-11 PROCEDURE — 99213 OFFICE O/P EST LOW 20 MIN: CPT | Performed by: INTERNAL MEDICINE

## 2018-05-11 RX ORDER — AZITHROMYCIN 250 MG/1
TABLET, FILM COATED ORAL
Qty: 6 TABLET | Refills: 0 | Status: SHIPPED | OUTPATIENT
Start: 2018-05-11 | End: 2018-05-19 | Stop reason: ALTCHOICE

## 2018-05-11 NOTE — PROGRESS NOTES
HPI:    Patient ID: Lindsey Lovell is a 63year old male. HPI  Patient is here with recent upper respiratory symptoms. A month ago he was in urgent care with flare of his asthma.   Given nebulizers and sent home on steroids, cough medicine, pain medicatio Inhaler Rfl: 0   Flaxseed, Linseed, (FLAXSEED OIL) 1000 MG Oral Cap Take  by mouth. Disp:  Rfl:    Multiple Vitamin (MULTIVITAMINS) Oral Cap Take 1 capsule by mouth.  Indications: 3 times week, mon/wed/fri Disp:  Rfl:    aspirin 325 MG Oral Tab Take 325 mg

## 2018-05-15 ENCOUNTER — TELEPHONE (OUTPATIENT)
Dept: INTERNAL MEDICINE CLINIC | Facility: CLINIC | Age: 63
End: 2018-05-15

## 2018-05-15 RX ORDER — AMLODIPINE BESYLATE 5 MG/1
5 TABLET ORAL
Qty: 90 TABLET | Refills: 0 | Status: SHIPPED | OUTPATIENT
Start: 2018-05-15 | End: 2018-09-14

## 2018-05-15 RX ORDER — OLMESARTAN MEDOXOMIL AND HYDROCHLOROTHIAZIDE 40/25 40; 25 MG/1; MG/1
1 TABLET ORAL
Qty: 90 TABLET | Refills: 0 | Status: SHIPPED | OUTPATIENT
Start: 2018-05-15 | End: 2018-09-14

## 2018-05-15 RX ORDER — METOPROLOL SUCCINATE 100 MG/1
100 TABLET, EXTENDED RELEASE ORAL
Qty: 90 TABLET | Refills: 0 | Status: SHIPPED | OUTPATIENT
Start: 2018-05-15 | End: 2018-09-14

## 2018-05-15 NOTE — TELEPHONE ENCOUNTER
Requesting Metoprolol Succinate, Amlodipine, and Olmesartan Medoxomil refills    Hypertensive Medications  Protocol Criteria:  · Appointment scheduled in the past 6 months or in the next 3 months  · BMP or CMP in the past 12 months  · Creatinine result < 2

## 2018-05-15 NOTE — TELEPHONE ENCOUNTER
Toprol XL 100mg tabs, take 1 tab by mouth daily, qty 90; Norvasc 5mg tabs, take 1 tab by mouth daily, qty 90;  Benicar HCT 40-25mg tabs, take 1 tab by mouth daily, qty 90    Current Outpatient Prescriptions:   •  METOPROLOL SUCCINATE  MG Oral Tablet 2

## 2018-05-19 ENCOUNTER — OFFICE VISIT (OUTPATIENT)
Dept: INTERNAL MEDICINE CLINIC | Facility: CLINIC | Age: 63
End: 2018-05-19

## 2018-05-19 VITALS
BODY MASS INDEX: 43.65 KG/M2 | WEIGHT: 301.5 LBS | RESPIRATION RATE: 20 BRPM | HEART RATE: 64 BPM | OXYGEN SATURATION: 94 % | SYSTOLIC BLOOD PRESSURE: 120 MMHG | TEMPERATURE: 98 F | HEIGHT: 69.5 IN | DIASTOLIC BLOOD PRESSURE: 78 MMHG

## 2018-05-19 DIAGNOSIS — Z00.00 ROUTINE PHYSICAL EXAMINATION: Primary | ICD-10-CM

## 2018-05-19 DIAGNOSIS — E66.9 OBESITY, UNSPECIFIED OBESITY SEVERITY, UNSPECIFIED OBESITY TYPE: ICD-10-CM

## 2018-05-19 DIAGNOSIS — J45.21 MILD INTERMITTENT ASTHMA WITH EXACERBATION: ICD-10-CM

## 2018-05-19 DIAGNOSIS — I10 ESSENTIAL HYPERTENSION WITH GOAL BLOOD PRESSURE LESS THAN 140/90: ICD-10-CM

## 2018-05-19 PROCEDURE — 99396 PREV VISIT EST AGE 40-64: CPT | Performed by: INTERNAL MEDICINE

## 2018-05-19 NOTE — PATIENT INSTRUCTIONS
Contact the insurance company to check on coverage for the Shingrix 2 shot series for shingles. Call us back to set up a nurse's appointment.

## 2018-05-19 NOTE — PROGRESS NOTES
HPI:    Patient ID: Cheryl Horner is a 63year old male. HPI  Patient is here for physical exam and follow-up on chronic medical issues as listed below. Last seen on May 11 for upper respiratory infection. Things resolved after treating with Zithromax. premature CAD   • Heart Disorder Neg    • Lipids Neg       Smoking status: Never Smoker                                                              Smokeless tobacco: Never Used                      Alcohol use:  Yes              Comment: occ the lungs 2 (two) times daily. Disp: 1 Inhaler Rfl: 0   Flaxseed, Linseed, (FLAXSEED OIL) 1000 MG Oral Cap Take  by mouth. Disp:  Rfl:    Multiple Vitamin (MULTIVITAMINS) Oral Cap Take 1 capsule by mouth.  Indications: 3 times week, mon/wed/fri Disp:  Rfl: behavior is normal. Judgment and thought content normal.              ASSESSMENT/PLAN:   (Z00.00) Routine physical examination  (primary encounter diagnosis)  Plan: Physical exam today unremarkable other than obesity. Active issues as below.   Health maint

## 2018-09-14 RX ORDER — OLMESARTAN MEDOXOMIL AND HYDROCHLOROTHIAZIDE 40/25 40; 25 MG/1; MG/1
1 TABLET ORAL
Qty: 90 TABLET | Refills: 0 | Status: SHIPPED | OUTPATIENT
Start: 2018-09-14 | End: 2018-11-20

## 2018-09-14 RX ORDER — METOPROLOL SUCCINATE 100 MG/1
TABLET, EXTENDED RELEASE ORAL
Qty: 90 TABLET | Refills: 0 | Status: SHIPPED | OUTPATIENT
Start: 2018-09-14 | End: 2018-11-20

## 2018-09-14 RX ORDER — AMLODIPINE BESYLATE 5 MG/1
TABLET ORAL
Qty: 90 TABLET | Refills: 0 | Status: SHIPPED | OUTPATIENT
Start: 2018-09-14 | End: 2018-11-20

## 2018-09-15 NOTE — TELEPHONE ENCOUNTER
Hypertensive Medications  Protocol Criteria:  · Appointment scheduled in the past 6 months or in the next 3 months  · BMP or CMP in the past 12 months  · Creatinine result < 2  Recent Outpatient Visits            3 months ago Routine physical examination

## 2018-11-13 ENCOUNTER — OFFICE VISIT (OUTPATIENT)
Dept: INTERNAL MEDICINE CLINIC | Facility: CLINIC | Age: 63
End: 2018-11-13
Payer: COMMERCIAL

## 2018-11-13 VITALS
BODY MASS INDEX: 44.74 KG/M2 | HEIGHT: 69.5 IN | DIASTOLIC BLOOD PRESSURE: 86 MMHG | WEIGHT: 309 LBS | SYSTOLIC BLOOD PRESSURE: 124 MMHG | HEART RATE: 80 BPM

## 2018-11-13 DIAGNOSIS — M10.9 ACUTE GOUT INVOLVING TOE OF RIGHT FOOT, UNSPECIFIED CAUSE: Primary | ICD-10-CM

## 2018-11-13 PROCEDURE — 99213 OFFICE O/P EST LOW 20 MIN: CPT | Performed by: INTERNAL MEDICINE

## 2018-11-13 PROCEDURE — 99212 OFFICE O/P EST SF 10 MIN: CPT | Performed by: INTERNAL MEDICINE

## 2018-11-13 RX ORDER — NAPROXEN 500 MG/1
500 TABLET ORAL 2 TIMES DAILY WITH MEALS
Qty: 30 TABLET | Refills: 0 | Status: SHIPPED | OUTPATIENT
Start: 2018-11-13 | End: 2019-11-08

## 2018-11-13 NOTE — PROGRESS NOTES
Hal Orourke is a 66year old male. Patient presents with: Toe Pain: C/o constant pain in his right big toe since 4am this morning.   Stts there's also swelling, redness and tender to touch    HPI:   He awoke from sleep at about 4 AM today with acute and s OR) Take 1 capsule by mouth daily. Disp:  Rfl:    COD LIVER OIL OR Take 1 capsule by mouth daily.  Disp:  Rfl:        Clemizole               OTHER (SEE COMMENTS)  Penicillins             OTHER (SEE COMMENTS)    Comment:Other reaction(s): Dizziness   Past M

## 2018-11-13 NOTE — PATIENT INSTRUCTIONS
Please take naproxen 500 mg twice daily with food. Stop aspirin while on naproxen. Call if no better. Please follow-up with Dr. Nancy Lynn soon.

## 2018-11-20 RX ORDER — METOPROLOL SUCCINATE 100 MG/1
100 TABLET, EXTENDED RELEASE ORAL
Qty: 90 TABLET | Refills: 1 | Status: SHIPPED | OUTPATIENT
Start: 2018-11-20 | End: 2018-11-27

## 2018-11-20 RX ORDER — AMLODIPINE BESYLATE 5 MG/1
5 TABLET ORAL
Qty: 90 TABLET | Refills: 1 | Status: SHIPPED | OUTPATIENT
Start: 2018-11-20 | End: 2018-11-27

## 2018-11-20 RX ORDER — OLMESARTAN MEDOXOMIL AND HYDROCHLOROTHIAZIDE 40/25 40; 25 MG/1; MG/1
1 TABLET ORAL
Qty: 90 TABLET | Refills: 1 | Status: SHIPPED | OUTPATIENT
Start: 2018-11-20 | End: 2018-11-27

## 2018-11-20 NOTE — TELEPHONE ENCOUNTER
Current Outpatient Medications:  AMLODIPINE BESYLATE 5 MG Oral Tab TAKE 1 TABLET BY MOUTH ONCE DAILY Disp: 90 tablet Rfl: 0   METOPROLOL SUCCINATE  MG Oral Tablet 24 Hr TAKE 1 TABLET BY MOUTH ONCE DAILY Disp: 90 tablet Rfl: 0   OLMESARTAN MEDOXOMIL

## 2018-11-27 RX ORDER — OLMESARTAN MEDOXOMIL AND HYDROCHLOROTHIAZIDE 40/25 40; 25 MG/1; MG/1
1 TABLET ORAL
Qty: 90 TABLET | Refills: 1 | Status: SHIPPED | OUTPATIENT
Start: 2018-11-27 | End: 2019-05-08

## 2018-11-27 RX ORDER — METOPROLOL SUCCINATE 100 MG/1
100 TABLET, EXTENDED RELEASE ORAL
Qty: 90 TABLET | Refills: 1 | Status: SHIPPED | OUTPATIENT
Start: 2018-11-27 | End: 2019-05-08

## 2018-11-27 RX ORDER — AMLODIPINE BESYLATE 5 MG/1
5 TABLET ORAL
Qty: 90 TABLET | Refills: 1 | Status: SHIPPED | OUTPATIENT
Start: 2018-11-27 | End: 2019-05-08

## 2018-11-27 NOTE — TELEPHONE ENCOUNTER
Verified with the pt that he wants the rxs sent to mail order [facility] . rxs resent. [facility] apprised.

## 2018-12-31 ENCOUNTER — OFFICE VISIT (OUTPATIENT)
Dept: INTERNAL MEDICINE CLINIC | Facility: CLINIC | Age: 63
End: 2018-12-31
Payer: COMMERCIAL

## 2018-12-31 VITALS
TEMPERATURE: 98 F | DIASTOLIC BLOOD PRESSURE: 86 MMHG | BODY MASS INDEX: 44.59 KG/M2 | HEIGHT: 69.5 IN | SYSTOLIC BLOOD PRESSURE: 134 MMHG | RESPIRATION RATE: 20 BRPM | HEART RATE: 58 BPM | WEIGHT: 308 LBS

## 2018-12-31 DIAGNOSIS — M10.9 GOUT INVOLVING TOE, UNSPECIFIED CAUSE, UNSPECIFIED CHRONICITY, UNSPECIFIED LATERALITY: Primary | ICD-10-CM

## 2018-12-31 PROCEDURE — 99212 OFFICE O/P EST SF 10 MIN: CPT | Performed by: INTERNAL MEDICINE

## 2018-12-31 PROCEDURE — 99213 OFFICE O/P EST LOW 20 MIN: CPT | Performed by: INTERNAL MEDICINE

## 2018-12-31 RX ORDER — DORZOLAMIDE HCL 20 MG/ML
SOLUTION/ DROPS OPHTHALMIC
COMMUNITY
Start: 2018-12-03

## 2018-12-31 RX ORDER — BRIMONIDINE TARTRATE 2 MG/ML
SOLUTION/ DROPS OPHTHALMIC
Refills: 6 | COMMUNITY
Start: 2018-09-12

## 2018-12-31 RX ORDER — BRIMONIDINE TARTRATE 2 MG/ML
1 SOLUTION/ DROPS OPHTHALMIC
COMMUNITY
Start: 2018-09-12 | End: 2018-12-31

## 2018-12-31 RX ORDER — LATANOPROST 50 UG/ML
SOLUTION/ DROPS OPHTHALMIC
COMMUNITY
Start: 2018-07-23 | End: 2018-12-31

## 2018-12-31 NOTE — PATIENT INSTRUCTIONS
Options for treating gout-  Stop the hydrochlorothiazide portion of the olmesartan-hydrochlorothiazide (Benicar HCT). Start allopurinol to decrease uric acid.

## 2018-12-31 NOTE — PROGRESS NOTES
HPI:    Patient ID: David Alarcon is a 66year old male. HPI   patient is here to discuss recent episode of gout. I last saw him in May for general physical exam but he was doing well at that time.   Saw different internal medicine doctor on November 14 Disp:  Rfl:    Multiple Vitamin (MULTIVITAMINS) Oral Cap Take 1 capsule by mouth. Indications: 3 times week, mon/wed/fri Disp:  Rfl:    aspirin 325 MG Oral Tab Take 325 mg by mouth daily.  Disp:  Rfl:    Fish Oil-Cholecalciferol (FISH OIL + D3 OR) Take 1 ca

## 2019-05-10 RX ORDER — AMLODIPINE BESYLATE 5 MG/1
TABLET ORAL
Qty: 90 TABLET | Refills: 1 | Status: SHIPPED | OUTPATIENT
Start: 2019-05-10 | End: 2020-01-26

## 2019-05-10 RX ORDER — METOPROLOL SUCCINATE 100 MG/1
TABLET, EXTENDED RELEASE ORAL
Qty: 90 TABLET | Refills: 1 | Status: SHIPPED | OUTPATIENT
Start: 2019-05-10 | End: 2020-01-26

## 2019-05-10 RX ORDER — OLMESARTAN MEDOXOMIL AND HYDROCHLOROTHIAZIDE 40/25 40; 25 MG/1; MG/1
1 TABLET ORAL
Qty: 90 TABLET | Refills: 1 | Status: SHIPPED | OUTPATIENT
Start: 2019-05-10 | End: 2019-06-15

## 2019-05-10 NOTE — TELEPHONE ENCOUNTER
Please review; protocol failed.  D/t labs sent to [facility] covering for [facility]  Requested Prescriptions     Pending Prescriptions Disp Refills   • AMLODIPINE BESYLATE 5 MG Oral Tab [Pharmacy Med Name: AMLODIPINE  5MG  TAB] 90 tablet 1     Sig: TAKE 1 TABLET BY LEÓN

## 2019-06-15 ENCOUNTER — OFFICE VISIT (OUTPATIENT)
Dept: INTERNAL MEDICINE CLINIC | Facility: CLINIC | Age: 64
End: 2019-06-15
Payer: COMMERCIAL

## 2019-06-15 VITALS
SYSTOLIC BLOOD PRESSURE: 142 MMHG | WEIGHT: 315 LBS | HEIGHT: 69.5 IN | OXYGEN SATURATION: 95 % | TEMPERATURE: 98 F | RESPIRATION RATE: 20 BRPM | BODY MASS INDEX: 45.61 KG/M2 | HEART RATE: 88 BPM | DIASTOLIC BLOOD PRESSURE: 94 MMHG

## 2019-06-15 DIAGNOSIS — J45.20 MILD INTERMITTENT ASTHMA WITHOUT COMPLICATION: ICD-10-CM

## 2019-06-15 DIAGNOSIS — E66.9 OBESITY, UNSPECIFIED OBESITY SEVERITY, UNSPECIFIED OBESITY TYPE: ICD-10-CM

## 2019-06-15 DIAGNOSIS — K63.5 POLYP OF COLON, UNSPECIFIED PART OF COLON, UNSPECIFIED TYPE: ICD-10-CM

## 2019-06-15 DIAGNOSIS — I10 ESSENTIAL HYPERTENSION WITH GOAL BLOOD PRESSURE LESS THAN 140/90: ICD-10-CM

## 2019-06-15 DIAGNOSIS — Z00.00 ROUTINE PHYSICAL EXAMINATION: Primary | ICD-10-CM

## 2019-06-15 DIAGNOSIS — M10.9 GOUT INVOLVING TOE, UNSPECIFIED CAUSE, UNSPECIFIED CHRONICITY, UNSPECIFIED LATERALITY: ICD-10-CM

## 2019-06-15 PROCEDURE — 99396 PREV VISIT EST AGE 40-64: CPT | Performed by: INTERNAL MEDICINE

## 2019-06-15 RX ORDER — OLMESARTAN MEDOXOMIL AND HYDROCHLOROTHIAZIDE 40/25 40; 25 MG/1; MG/1
1 TABLET ORAL
Qty: 30 TABLET | Refills: 1 | Status: SHIPPED | OUTPATIENT
Start: 2019-06-15 | End: 2019-08-14

## 2019-06-15 NOTE — PROGRESS NOTES
HPI:    Patient ID: Bonnie Olmstead is a 66year old male. HPI  Patient is here for physical exam and follow-up on chronic medical issues as listed below. Last seen here in late December for gout. That resolved.   There was no change in medications at [facility] Arthritis Daughter         rheumatoid   • Heart Disease Paternal Grandmother         premature CAD   • Heart Disorder Neg    • Lipids Neg       Social History    Tobacco Use      Smoking status: Never Smoker      Smokeless tobacco: Never Used    Alcohol us BASE) MCG/ACT Inhalation Aero Soln Inhale 2 puffs into the lungs every 4 (four) hours as needed. Disp: 1 Inhaler Rfl: 0   Fluticasone Propionate HFA (FLOVENT HFA) 110 MCG/ACT Inhalation Aerosol Inhale 2 puffs into the lungs 2 (two) times daily.  Disp: 1 Inh He has no cervical adenopathy. Right: No inguinal adenopathy present. Left: No inguinal adenopathy present. Neurological: He is alert. No cranial nerve deficit. Coordination normal.   Skin: Skin is warm and dry. No rash noted.    Psychia unspecified obesity severity, unspecified obesity type  Encourage long-term commitment to diet, exercise, weight loss. 5. BMI 45.0-49.9, adult ([facility] [address]  As above. 6. Mild intermittent asthma without complication  Lungs are clear.   Occasionally flares wh

## 2019-06-17 ENCOUNTER — LAB ENCOUNTER (OUTPATIENT)
Dept: LAB | Age: 64
End: 2019-06-17
Attending: INTERNAL MEDICINE
Payer: COMMERCIAL

## 2019-06-17 DIAGNOSIS — Z00.00 ROUTINE PHYSICAL EXAMINATION: ICD-10-CM

## 2019-06-17 PROCEDURE — 85025 COMPLETE CBC W/AUTO DIFF WBC: CPT

## 2019-06-17 PROCEDURE — 80053 COMPREHEN METABOLIC PANEL: CPT

## 2019-06-17 PROCEDURE — 82607 VITAMIN B-12: CPT

## 2019-06-17 PROCEDURE — 84550 ASSAY OF BLOOD/URIC ACID: CPT

## 2019-06-17 PROCEDURE — 84443 ASSAY THYROID STIM HORMONE: CPT

## 2019-06-17 PROCEDURE — 80061 LIPID PANEL: CPT

## 2019-06-17 PROCEDURE — 36415 COLL VENOUS BLD VENIPUNCTURE: CPT

## 2019-06-25 ENCOUNTER — TELEPHONE (OUTPATIENT)
Dept: INTERNAL MEDICINE CLINIC | Facility: CLINIC | Age: 64
End: 2019-06-25

## 2019-06-27 NOTE — TELEPHONE ENCOUNTER
[employer] form for Dr. Dom Moreau received in Forms dept+ FCR+ Signed release, paid $25 w/ . Logged for processing.  NK

## 2019-06-27 NOTE — TELEPHONE ENCOUNTER
Dr. Cash Flores,    Please sign off on form:  -Highlight the patient and hit \"Chart\" button. -In Chart Review, w/in the Encounter tab - click 1 time on the Telephone call encounter for 6/25/19.  Scroll down the telephone encounter.  -Click \"scan on\" blue H

## 2019-07-01 NOTE — TELEPHONE ENCOUNTER
Pt called in to advise that he is in the area and is requesting confirmation that his forms have been completed.   Please advise pt on an updated status

## 2019-07-01 NOTE — TELEPHONE ENCOUNTER
Dr. Reyes Garzon,    Please sign off on the form attached to the  6/25/19 encounter.     Thank Yasmine Perdomo

## 2019-07-02 NOTE — TELEPHONE ENCOUNTER
Patient will  a copy of his completed FMLA at [address] [facility]. Please print out the \"signed\" copy under the Media Tab.  SC

## 2019-07-16 NOTE — PROGRESS NOTES
[address] [facility]    Patient presen hospitalization; converted to NSR   • Bronchitis 2013    drug therapy   • Extrinsic asthma, unspecified    • Toenail fungus    • Unspecified essential hypertension       Past Surgical History:   Procedure Laterality Date   • APPENDECTOMY     • APPENDECTOMY (PROVENTIL HFA) 108 (90 BASE) MCG/ACT Inhalation Aero Soln Inhale 2 puffs into the lungs every 4 (four) hours as needed.  Disp: 1 Inhaler Rfl: 0   Fluticasone Propionate HFA (FLOVENT HFA) 110 MCG/ACT Inhalation Aerosol Inhale 2 puffs into the lungs 2 (two) soft, non-tender exam in all quadrants without rigidity or guarding, non-distended, no abnormal bowel sounds noted, no masses are palpated  : no CVA tenderness  Skin: dry, warm, no jaundice  Ext: no cyanosis, clubbing or edema is evident.    Neuro: Alert Split dose Colyte or equivalent  -Anti-platelets and anti-coagulants: ASA-continue as prescribed  -Diabetes meds: None    ** If MAC @ [facility]:    - HOLD olmesartan the night before and/or the day of the procedure(s)   - NO alcohol, recreational drugs nor er

## 2019-07-23 ENCOUNTER — OFFICE VISIT (OUTPATIENT)
Dept: GASTROENTEROLOGY | Facility: CLINIC | Age: 64
End: 2019-07-23
Payer: COMMERCIAL

## 2019-07-23 ENCOUNTER — TELEPHONE (OUTPATIENT)
Dept: GASTROENTEROLOGY | Facility: CLINIC | Age: 64
End: 2019-07-23

## 2019-07-23 VITALS
BODY MASS INDEX: 41.17 KG/M2 | HEART RATE: 83 BPM | SYSTOLIC BLOOD PRESSURE: 111 MMHG | DIASTOLIC BLOOD PRESSURE: 78 MMHG | WEIGHT: 304 LBS | HEIGHT: 72 IN

## 2019-07-23 DIAGNOSIS — Z12.11 COLON CANCER SCREENING: Primary | ICD-10-CM

## 2019-07-23 DIAGNOSIS — Z12.11 SCREENING FOR COLON CANCER: Primary | ICD-10-CM

## 2019-07-23 DIAGNOSIS — K59.00 CONSTIPATION, UNSPECIFIED CONSTIPATION TYPE: ICD-10-CM

## 2019-07-23 DIAGNOSIS — Z86.010 HISTORY OF COLON POLYPS: ICD-10-CM

## 2019-07-23 PROCEDURE — 99243 OFF/OP CNSLTJ NEW/EST LOW 30: CPT | Performed by: NURSE PRACTITIONER

## 2019-07-23 NOTE — PATIENT INSTRUCTIONS
-Schedule colonoscopy w/ Dr. Sarah Byrd w/ IV Twilight per pt preference  Dx: Screening, hx polyps   -Prep: Split dose Colyte or equivalent  -Anti-platelets and anti-coagulants: ASA-continue as prescribed  -Diabetes meds: None    ** If MAC @ [facility]/NE:    -

## 2019-07-23 NOTE — TELEPHONE ENCOUNTER
Scheduled for:  Colonoscopy [address]  Provider Name:   Date:  10/2/19  Location:  [facility]  Sedation:  Mac  Time:  0900 Nilesh Membreno 0800  Prep:Split dose Colyte  Meds/Allergies Reconciled?:  Physician reviewed  Diagnosis with codes: Colon cancer screening Z12.11

## 2019-07-23 NOTE — H&P
[address] [facility]    Patient presen hospitalization; converted to NSR   • Bronchitis 2013    drug therapy   • Extrinsic asthma, unspecified    • Toenail fungus    • Unspecified essential hypertension       Past Surgical History:   Procedure Laterality Date   • APPENDECTOMY     • APPENDECTOMY (PROVENTIL HFA) 108 (90 BASE) MCG/ACT Inhalation Aero Soln Inhale 2 puffs into the lungs every 4 (four) hours as needed.  Disp: 1 Inhaler Rfl: 0   Fluticasone Propionate HFA (FLOVENT HFA) 110 MCG/ACT Inhalation Aerosol Inhale 2 puffs into the lungs 2 (two) soft, non-tender exam in all quadrants without rigidity or guarding, non-distended, no abnormal bowel sounds noted, no masses are palpated  : no CVA tenderness  Skin: dry, warm, no jaundice  Ext: no cyanosis, clubbing or edema is evident.    Neuro: Alert Split dose Colyte or equivalent  -Anti-platelets and anti-coagulants: ASA-continue as prescribed  -Diabetes meds: None    ** If MAC @ [facility]:    - HOLD olmesartan the night before and/or the day of the procedure(s)   - NO alcohol, recreational drugs nor er

## 2019-08-14 ENCOUNTER — TELEPHONE (OUTPATIENT)
Dept: INTERNAL MEDICINE CLINIC | Facility: CLINIC | Age: 64
End: 2019-08-14

## 2019-08-14 RX ORDER — OLMESARTAN MEDOXOMIL 40 MG/1
40 TABLET ORAL DAILY
Qty: 90 TABLET | Refills: 0 | Status: CANCELLED | OUTPATIENT
Start: 2019-08-14

## 2019-08-14 RX ORDER — OLMESARTAN MEDOXOMIL AND HYDROCHLOROTHIAZIDE 40/25 40; 25 MG/1; MG/1
TABLET ORAL
Qty: 90 TABLET | Refills: 1 | Status: SHIPPED | OUTPATIENT
Start: 2019-08-14 | End: 2020-01-26

## 2019-08-14 RX ORDER — OLMESARTAN MEDOXOMIL AND HYDROCHLOROTHIAZIDE 40/25 40; 25 MG/1; MG/1
1 TABLET ORAL DAILY
Qty: 90 TABLET | Refills: 3 | Status: SHIPPED | OUTPATIENT
Start: 2019-08-14 | End: 2020-08-08

## 2019-08-14 RX ORDER — HYDROCHLOROTHIAZIDE 25 MG/1
25 TABLET ORAL DAILY
Qty: 90 TABLET | Refills: 0 | Status: CANCELLED | OUTPATIENT
Start: 2019-08-14

## 2019-08-14 NOTE — TELEPHONE ENCOUNTER
pharmacy called     Current Outpatient Medications:  Olmesartan Medoxomil-HCTZ 40-25 MG Oral Tab Take 1 tablet by mouth once daily. Disp: 30 tablet Rfl: 1   hydrochlorothiazide     Medication is on back order, and out of medication.   Pharmacist requesting

## 2019-08-15 NOTE — TELEPHONE ENCOUNTER
Refill passed per [facility], [facility] protocol.   Hypertensive Medications  Protocol Criteria:  · Appointment scheduled in the past 6 months or in the next 3 months  · BMP or CMP in the past 12 months  · Creatinine result < 2  Recent Outpatient Visits

## 2019-09-17 ENCOUNTER — OFFICE VISIT (OUTPATIENT)
Dept: INTERNAL MEDICINE CLINIC | Facility: CLINIC | Age: 64
End: 2019-09-17
Payer: COMMERCIAL

## 2019-09-17 VITALS
WEIGHT: 306 LBS | DIASTOLIC BLOOD PRESSURE: 72 MMHG | HEIGHT: 72 IN | HEART RATE: 71 BPM | SYSTOLIC BLOOD PRESSURE: 130 MMHG | BODY MASS INDEX: 41.45 KG/M2

## 2019-09-17 DIAGNOSIS — Z23 NEED FOR VACCINATION: ICD-10-CM

## 2019-09-17 DIAGNOSIS — E66.01 CLASS 3 SEVERE OBESITY DUE TO EXCESS CALORIES WITHOUT SERIOUS COMORBIDITY WITH BODY MASS INDEX (BMI) OF 40.0 TO 44.9 IN ADULT (HCC): ICD-10-CM

## 2019-09-17 DIAGNOSIS — I10 ESSENTIAL HYPERTENSION: ICD-10-CM

## 2019-09-17 DIAGNOSIS — K59.04 CHRONIC IDIOPATHIC CONSTIPATION: Primary | ICD-10-CM

## 2019-09-17 PROBLEM — G43.509 PERSISTENT MIGRAINE AURA WITHOUT CEREBRAL INFARCTION AND WITHOUT STATUS MIGRAINOSUS, NOT INTRACTABLE: Status: ACTIVE | Noted: 2019-09-17

## 2019-09-17 PROCEDURE — 99214 OFFICE O/P EST MOD 30 MIN: CPT | Performed by: NURSE PRACTITIONER

## 2019-09-17 PROCEDURE — 90471 IMMUNIZATION ADMIN: CPT | Performed by: NURSE PRACTITIONER

## 2019-09-17 PROCEDURE — 90686 IIV4 VACC NO PRSV 0.5 ML IM: CPT | Performed by: NURSE PRACTITIONER

## 2019-09-17 RX ORDER — POLYETHYLENE GLYCOL 3350 17 G/17G
17 POWDER, FOR SOLUTION ORAL DAILY
Qty: 510 G | Refills: 11 | Status: SHIPPED | OUTPATIENT
Start: 2019-09-17 | End: 2020-07-20

## 2019-09-17 RX ORDER — SUMATRIPTAN 25 MG/1
25 TABLET, FILM COATED ORAL EVERY 2 HOUR PRN
Qty: 9 TABLET | Refills: 1 | Status: SHIPPED | OUTPATIENT
Start: 2019-09-17

## 2019-09-17 NOTE — ASSESSMENT & PLAN NOTE
A/P she has never taken a flu vaccine in his life. Explained the importance of the vaccine. Patient educated and willing to take vaccine.

## 2019-09-17 NOTE — ASSESSMENT & PLAN NOTE
A/P Hx of hypertension came in blood pressure 140/88. Rechecked blood pressure has end of visit 130/72. Patient gave up red meat. - OLmedoxomil hydrochlorothiazide 1 tablet by mouth per day. -Amlodipine 5 mg daily  -Metoprolol 100 mg p.o. Daily.   Discus

## 2019-09-17 NOTE — ASSESSMENT & PLAN NOTE
A/P he is been having chronic constipation. He is going for a colonoscopy the first week of October.  -Over-the-counter MiraLAX recommended.

## 2019-09-17 NOTE — PROGRESS NOTES
HPI:    Patient ID: Charis Raman is a 60year old male. HPI  60year old male here for complains of a unilateral  headache on the left side of his head and behind his eye. It start 10 days ago.  The pain is constant and increases and decreases in intensi 0.005 % Ophthalmic Solution Place 1 drop into both eyes nightly.  Disp:  Rfl:    VIAGRA 50 MG Oral Tab TAKE 1 TABLET BY MOUTH ONE TIME A DAY AS NEEDED FOR ERECTILE DYSFUNCTION Disp: 6 tablet Rfl: 3   JUBLIA 10 % External Solution GENTLY APPLY A THIN LAYER T There is no guarding. Musculoskeletal: He exhibits no tenderness. Lymphadenopathy:     He has no cervical adenopathy. Neurological: He is alert and oriented to person, place, and time. Skin: Skin is warm and dry. No rash noted.    Psychiatric: He ha Sig: Take 17 g by mouth daily.        Imaging & Referrals:  FLULAVAL INFLUENZA VACCINE QUAD PRESERVATIVE FREE 0.5 ML         COLEEN Toro

## 2019-09-17 NOTE — ASSESSMENT & PLAN NOTE
A/P 60year-old patient with unilateral headache. It started 10 days ago and has seemed to intensify. He points to the middle of his head on the left side. The pain is a throbbing and shooting pain that increases and decreases.   Loud noises and sounds a

## 2019-09-17 NOTE — ASSESSMENT & PLAN NOTE
A/P BMI 41. Educated on body mass index chart. Patient agrees he needs to lose weight. He says he is given up red meat. Reviewed plan protein diet with him.

## 2019-10-02 ENCOUNTER — ANESTHESIA (OUTPATIENT)
Dept: ENDOSCOPY | Facility: HOSPITAL | Age: 64
End: 2019-10-02
Payer: COMMERCIAL

## 2019-10-02 ENCOUNTER — HOSPITAL ENCOUNTER (OUTPATIENT)
Facility: HOSPITAL | Age: 64
Setting detail: HOSPITAL OUTPATIENT SURGERY
Discharge: HOME | End: 2019-10-02
Attending: INTERNAL MEDICINE | Admitting: INTERNAL MEDICINE
Payer: COMMERCIAL

## 2019-10-02 ENCOUNTER — ANESTHESIA EVENT (OUTPATIENT)
Dept: ENDOSCOPY | Facility: HOSPITAL | Age: 64
End: 2019-10-02
Payer: COMMERCIAL

## 2019-10-02 VITALS
SYSTOLIC BLOOD PRESSURE: 133 MMHG | DIASTOLIC BLOOD PRESSURE: 81 MMHG | HEART RATE: 67 BPM | BODY MASS INDEX: 40.63 KG/M2 | HEIGHT: 72 IN | WEIGHT: 300 LBS | RESPIRATION RATE: 14 BRPM | OXYGEN SATURATION: 98 %

## 2019-10-02 DIAGNOSIS — Z12.11 COLON CANCER SCREENING: ICD-10-CM

## 2019-10-02 PROCEDURE — 45378 DIAGNOSTIC COLONOSCOPY: CPT | Performed by: INTERNAL MEDICINE

## 2019-10-02 PROCEDURE — 0DJD8ZZ INSPECTION OF LOWER INTESTINAL TRACT, VIA NATURAL OR ARTIFICIAL OPENING ENDOSCOPIC: ICD-10-PCS | Performed by: INTERNAL MEDICINE

## 2019-10-02 RX ORDER — NALOXONE HYDROCHLORIDE 0.4 MG/ML
80 INJECTION, SOLUTION INTRAMUSCULAR; INTRAVENOUS; SUBCUTANEOUS AS NEEDED
Status: DISCONTINUED | OUTPATIENT
Start: 2019-10-02 | End: 2019-10-02

## 2019-10-02 RX ORDER — SODIUM CHLORIDE, SODIUM LACTATE, POTASSIUM CHLORIDE, CALCIUM CHLORIDE 600; 310; 30; 20 MG/100ML; MG/100ML; MG/100ML; MG/100ML
INJECTION, SOLUTION INTRAVENOUS CONTINUOUS
Status: DISCONTINUED | OUTPATIENT
Start: 2019-10-02 | End: 2019-10-02

## 2019-10-02 RX ORDER — LIDOCAINE HYDROCHLORIDE 10 MG/ML
INJECTION, SOLUTION EPIDURAL; INFILTRATION; INTRACAUDAL; PERINEURAL AS NEEDED
Status: DISCONTINUED | OUTPATIENT
Start: 2019-10-02 | End: 2019-10-02 | Stop reason: SURG

## 2019-10-02 RX ORDER — ONDANSETRON 2 MG/ML
4 INJECTION INTRAMUSCULAR; INTRAVENOUS ONCE AS NEEDED
Status: DISCONTINUED | OUTPATIENT
Start: 2019-10-02 | End: 2019-10-02

## 2019-10-02 RX ADMIN — SODIUM CHLORIDE, SODIUM LACTATE, POTASSIUM CHLORIDE, CALCIUM CHLORIDE: 600; 310; 30; 20 INJECTION, SOLUTION INTRAVENOUS at 09:53:00

## 2019-10-02 RX ADMIN — LIDOCAINE HYDROCHLORIDE 50 MG: 10 INJECTION, SOLUTION EPIDURAL; INFILTRATION; INTRACAUDAL; PERINEURAL at 09:22:00

## 2019-10-02 NOTE — OPERATIVE REPORT
[facility] Endoscopy Report      Date of Procedure:  10/02/19      Preoperative Diagnosis:  1. Personal history of adenomatous colon polyps  2. Colorectal cancer screening  3.   Anorectal bleeding      Postoperative Diagnosis:  Internal he terminal ileum    Recommendations:  1. High-fiber diet and conservative measures for internal hemorrhoids.   2.  Screening colonoscopy interval can likely be increased back to 10 years based on only 1-2 adenomas previously and no adenomas on today's examin

## 2019-10-02 NOTE — ANESTHESIA PREPROCEDURE EVALUATION
Anesthesia PreOp Note    HPI:     Kennon Favre is a 60year old male who presents for preoperative consultation requested by: Halima Santos MD    Date of Surgery: 10/2/2019    Procedure(s):  COLONOSCOPY  Indication: Colon cancer screening    Releva OLMESARTAN MEDOXOMIL-HCTZ 40-25 MG Oral Tab TAKE 1 TABLET BY MOUTH ONE TIME A DAY  Disp: 90 tablet Rfl: 1 9/30/2019 at 1000   Olmesartan Medoxomil-HCTZ 40-25 MG Oral Tab Take 1 tablet by mouth daily.  Disp: 90 tablet Rfl: 3 Taking   PEG 3350-KCl-NaBcb-NaC Oil-Cholecalciferol (FISH OIL + D3 OR) Take 1 capsule by mouth daily. Disp:  Rfl:  10/1/2019   COD LIVER OIL OR Take 1 capsule by mouth daily.  Disp:  Rfl:  Taking       Current Facility-Administered Medications Ordered in Epic:  lactated ringers infusion Not on file        Attends meetings of clubs or organizations: Not on file        Relationship status: Not on file      Intimate partner violence:        Fear of current or ex partner: Not on file        Emotionally abused: Not on file        Physically ab and/or legal guardian or family member of the nature of the anesthetic plan, benefits, risks including possible dental damage if relevant, major complications, and any alternative forms of anesthetic management.    All of the patient's questions were answer

## 2019-10-02 NOTE — ANESTHESIA POSTPROCEDURE EVALUATION
Patient: Carrie Lord    Procedure Summary     Date:  10/02/19 Room / Location:  [address] [place] [address] ENDOSCOPY 04 / [address] [place] [address] ENDOSCOPY    Anesthesia Start:  0425 Anesthesia Stop:  3947    Procedure:  COLONOSCOPY (N/A ) Diagnosis:       Colon cancer screening      (hemorrhoids

## 2019-10-03 ENCOUNTER — TELEPHONE (OUTPATIENT)
Dept: GASTROENTEROLOGY | Facility: CLINIC | Age: 64
End: 2019-10-03

## 2019-10-03 NOTE — TELEPHONE ENCOUNTER
Recall colon in 10 years entered per Dr. Claudell Public & updated in health maintenance. Colon done 10/2/19.

## 2019-11-21 ENCOUNTER — OFFICE VISIT (OUTPATIENT)
Dept: INTERNAL MEDICINE CLINIC | Facility: CLINIC | Age: 64
End: 2019-11-21
Payer: COMMERCIAL

## 2019-11-21 ENCOUNTER — NURSE TRIAGE (OUTPATIENT)
Dept: INTERNAL MEDICINE CLINIC | Facility: CLINIC | Age: 64
End: 2019-11-21

## 2019-11-21 VITALS
TEMPERATURE: 99 F | WEIGHT: 305 LBS | BODY MASS INDEX: 41.31 KG/M2 | SYSTOLIC BLOOD PRESSURE: 146 MMHG | HEIGHT: 72 IN | HEART RATE: 74 BPM | RESPIRATION RATE: 20 BRPM | DIASTOLIC BLOOD PRESSURE: 88 MMHG

## 2019-11-21 DIAGNOSIS — J01.01 ACUTE RECURRENT MAXILLARY SINUSITIS: Primary | ICD-10-CM

## 2019-11-21 PROCEDURE — 99213 OFFICE O/P EST LOW 20 MIN: CPT | Performed by: NURSE PRACTITIONER

## 2019-11-21 RX ORDER — BENZONATATE 100 MG/1
100 CAPSULE ORAL 3 TIMES DAILY PRN
Qty: 20 CAPSULE | Refills: 0 | Status: SHIPPED | OUTPATIENT
Start: 2019-11-21 | End: 2019-11-28

## 2019-11-21 RX ORDER — AZITHROMYCIN 250 MG/1
TABLET, FILM COATED ORAL
Qty: 6 TABLET | Refills: 0 | Status: SHIPPED | OUTPATIENT
Start: 2019-11-21 | End: 2020-03-18 | Stop reason: ALTCHOICE

## 2019-11-21 NOTE — TELEPHONE ENCOUNTER
Action Requested: Summary for Provider     []  Critical Lab, Recommendations Needed  [] Need Additional Advice  []   FYI    []   Need Orders  [] Need Medications Sent to Pharmacy  []  Other     SUMMARY:   Appointment made for today  11/21/19    The patient

## 2019-11-21 NOTE — TELEPHONE ENCOUNTER
Patient, states that he thinks he has a respiratory infection. Per pt he is experiencing some shortness of breath. Transferred call to triage.

## 2019-11-22 NOTE — PROGRESS NOTES
HPI:    Patient ID: Gladys Patiño is a 60year old male. HPI Patient here with cold symptoms and increased fatigue. He started working the night shift and it has been difficult to adjust.   Head congestion started on Monday. Then he had a sore throat and tablet by mouth daily.  90 tablet 3   • AMLODIPINE BESYLATE 5 MG Oral Tab TAKE 1 TABLET BY MOUTH ONCE DAILY 90 tablet 1   • METOPROLOL SUCCINATE  MG Oral Tablet 24 Hr TAKE 1 TABLET BY MOUTH ONCE DAILY 90 tablet 1   • Dorzolamide HCl 2 % Ophthalmic Sol rate, regular rhythm, normal heart sounds and intact distal pulses. Exam reveals no gallop and no friction rub. No murmur heard. Pulmonary/Chest: Effort normal and breath sounds normal. He has no wheezes. He has no rales. Abdominal: Soft.  Bowel soun two tablets by mouth today, then one tablet daily. • benzonatate 100 MG Oral Cap 20 capsule 0     Sig: Take 1 capsule (100 mg total) by mouth 3 (three) times daily as needed for cough.        Imaging & Referrals:  None         COLEEN Richard

## 2019-11-22 NOTE — ASSESSMENT & PLAN NOTE
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 He presents with sinus pain and pressure. Other with cold symptoms with nasal congestion and a sore throat. His sore throat is better now with pain 2/10.   He comp

## 2019-12-23 RX ORDER — SILDENAFIL 50 MG/1
TABLET, FILM COATED ORAL
Qty: 6 TABLET | Refills: 3 | Status: SHIPPED | OUTPATIENT
Start: 2019-12-23 | End: 2020-02-06

## 2019-12-23 NOTE — TELEPHONE ENCOUNTER
Patient is requesting refill of medication Sildenafil (VIAGRA). Patient states he is out of medication.     Patient states he contacted [place] to request refill, however, [facility] advised patient to contact PCP's Dr. Gagandeep Haywood office to re

## 2019-12-23 NOTE — TELEPHONE ENCOUNTER
Please advise on refill request.     Refill Protocol Appointment Criteria  · Appointment scheduled in the past 6 months or in the next 3 months  Recent Outpatient Visits            1 month ago Acute recurrent maxillary sinusitis    [facility], [facility], [address]

## 2020-01-01 ENCOUNTER — EXTERNAL RECORD (OUTPATIENT)
Dept: HEALTH INFORMATION MANAGEMENT | Facility: OTHER | Age: 65
End: 2020-01-01

## 2020-01-26 RX ORDER — METOPROLOL SUCCINATE 100 MG/1
TABLET, EXTENDED RELEASE ORAL
Qty: 90 TABLET | Refills: 1 | Status: SHIPPED | OUTPATIENT
Start: 2020-01-26 | End: 2020-08-18

## 2020-01-26 RX ORDER — AMLODIPINE BESYLATE 5 MG/1
TABLET ORAL
Qty: 90 TABLET | Refills: 1 | Status: SHIPPED | OUTPATIENT
Start: 2020-01-26 | End: 2020-08-18

## 2020-01-26 RX ORDER — OLMESARTAN MEDOXOMIL AND HYDROCHLOROTHIAZIDE 40/25 40; 25 MG/1; MG/1
TABLET ORAL
Qty: 90 TABLET | Refills: 1 | Status: SHIPPED | OUTPATIENT
Start: 2020-01-26 | End: 2020-03-18

## 2020-01-26 NOTE — TELEPHONE ENCOUNTER
Refill passed per [facility], [facility] protocol.   Hypertensive Medications  Protocol Criteria:  · Appointment scheduled in the past 6 months or in the next 3 months  · BMP or CMP in the past 12 months  · Creatinine result < 2  Recent Outpatient Visits

## 2020-02-05 NOTE — TELEPHONE ENCOUNTER
Patient called in requesting a script for medication below sent to mail order pharmacy (on encounter). He states that they are quoting a good price for 90 day supply and would need a new script. He states that he is out of medication.      Please adv

## 2020-02-06 RX ORDER — SILDENAFIL 50 MG/1
TABLET, FILM COATED ORAL
Qty: 6 TABLET | Refills: 1 | Status: SHIPPED | OUTPATIENT
Start: 2020-02-06 | End: 2020-08-18

## 2020-02-07 NOTE — TELEPHONE ENCOUNTER
Refill passed per [facility], [facility] protocol.     Requested Prescriptions   Pending Prescriptions Disp Refills   • Sildenafil Citrate (VIAGRA) 50 MG Oral Tab 6 tablet 3     Sig: TAKE 1 TABLET BY MOUTH ONE TIME A DAY AS NEEDED FOR ERECTILE DYSFUNCTION       Ge

## 2020-03-16 ENCOUNTER — NURSE TRIAGE (OUTPATIENT)
Dept: OTHER | Age: 65
End: 2020-03-16

## 2020-03-16 NOTE — TELEPHONE ENCOUNTER
Action Requested: Summary for Provider     []  Critical Lab, Recommendations Needed  [x] Need Additional Advice  []   FYI    []   Need Orders  [] Need Medications Sent to Pharmacy  []  Other     SUMMARY: pt states he has pain in his lungs, [facility]

## 2020-03-16 NOTE — TELEPHONE ENCOUNTER
Patient gave history of 3 days of feeling tired with a burning pain in his lungs. Generally in the left side occasionally on the right. Worse with a deep breath. No fever. No cough. No exposure anywhere that is been sick.   Denies any shortness of shayy

## 2020-03-18 ENCOUNTER — HOSPITAL ENCOUNTER (OUTPATIENT)
Age: 65
Discharge: HOME | End: 2020-03-18
Attending: EMERGENCY MEDICINE
Payer: COMMERCIAL

## 2020-03-18 ENCOUNTER — APPOINTMENT (OUTPATIENT)
Dept: GENERAL RADIOLOGY | Age: 65
End: 2020-03-18
Attending: EMERGENCY MEDICINE
Payer: COMMERCIAL

## 2020-03-18 VITALS
OXYGEN SATURATION: 97 % | TEMPERATURE: 98 F | SYSTOLIC BLOOD PRESSURE: 116 MMHG | DIASTOLIC BLOOD PRESSURE: 75 MMHG | HEART RATE: 60 BPM | RESPIRATION RATE: 20 BRPM

## 2020-03-18 DIAGNOSIS — R07.89 CHEST PAIN, ATYPICAL: Primary | ICD-10-CM

## 2020-03-18 DIAGNOSIS — R53.83 FATIGUE, UNSPECIFIED TYPE: ICD-10-CM

## 2020-03-18 LAB
#MXD IC: 0.7 X10ˆ3/UL (ref 0.1–1)
ALBUMIN SERPL-MCNC: 3.6 G/DL (ref 3.4–5)
ALBUMIN/GLOB SERPL: 0.9 {RATIO} (ref 1–2)
ALP LIVER SERPL-CCNC: 51 U/L (ref 45–117)
ALT SERPL-CCNC: 29 U/L (ref 16–61)
ANION GAP SERPL CALC-SCNC: 8 MMOL/L (ref 0–18)
AST SERPL-CCNC: 22 U/L (ref 15–37)
BILIRUB SERPL-MCNC: 0.3 MG/DL (ref 0.1–2)
BUN BLD-MCNC: 18 MG/DL (ref 7–18)
BUN/CREAT SERPL: 16.8 (ref 10–20)
CALCIUM BLD-MCNC: 9.3 MG/DL (ref 8.5–10.1)
CHLORIDE SERPL-SCNC: 101 MMOL/L (ref 98–112)
CO2 SERPL-SCNC: 26 MMOL/L (ref 21–32)
CREAT BLD-MCNC: 1.07 MG/DL (ref 0.7–1.3)
DDIMER WHOLE BLOOD: <100 NG/ML DDU (ref ?–400)
GLOBULIN PLAS-MCNC: 4.1 G/DL (ref 2.8–4.4)
GLUCOSE BLD-MCNC: 98 MG/DL (ref 70–99)
HCT VFR BLD AUTO: 46.6 % (ref 39–53)
HGB BLD-MCNC: 14.4 G/DL (ref 13–17.5)
ISTAT TROPONIN: <0.1 NG/ML (ref ?–0.1)
LYMPHOCYTES # BLD AUTO: 3.2 X10ˆ3/UL (ref 1–4)
LYMPHOCYTES NFR BLD AUTO: 36.1 %
M PROTEIN MFR SERPL ELPH: 7.7 G/DL (ref 6.4–8.2)
MCH RBC QN AUTO: 23.5 PG (ref 26–34)
MCHC RBC AUTO-ENTMCNC: 30.9 G/DL (ref 31–37)
MCV RBC AUTO: 75.9 FL (ref 80–100)
MIXED CELL %: 7.4 %
NEUTROPHILS # BLD AUTO: 4.9 X10ˆ3/UL (ref 1.5–7.7)
NEUTROPHILS NFR BLD AUTO: 56.5 %
OSMOLALITY SERPL CALC.SUM OF ELEC: 282 MOSM/KG (ref 275–295)
PATIENT FASTING Y/N/NP: NO
PLATELET # BLD AUTO: 325 X10ˆ3/UL (ref 150–450)
POTASSIUM SERPL-SCNC: 3.3 MMOL/L (ref 3.5–5.1)
RBC # BLD AUTO: 6.14 X10ˆ6/UL (ref 4.3–5.7)
SODIUM SERPL-SCNC: 135 MMOL/L (ref 136–145)
WBC # BLD AUTO: 8.8 X10ˆ3/UL (ref 4–11)

## 2020-03-18 PROCEDURE — 85025 COMPLETE CBC W/AUTO DIFF WBC: CPT | Performed by: EMERGENCY MEDICINE

## 2020-03-18 PROCEDURE — 99215 OFFICE O/P EST HI 40 MIN: CPT

## 2020-03-18 PROCEDURE — 93010 ELECTROCARDIOGRAM REPORT: CPT

## 2020-03-18 PROCEDURE — 71046 X-RAY EXAM CHEST 2 VIEWS: CPT | Performed by: EMERGENCY MEDICINE

## 2020-03-18 PROCEDURE — 85378 FIBRIN DEGRADE SEMIQUANT: CPT | Performed by: EMERGENCY MEDICINE

## 2020-03-18 PROCEDURE — 93005 ELECTROCARDIOGRAM TRACING: CPT

## 2020-03-18 PROCEDURE — 84484 ASSAY OF TROPONIN QUANT: CPT

## 2020-03-18 PROCEDURE — 80053 COMPREHEN METABOLIC PANEL: CPT | Performed by: EMERGENCY MEDICINE

## 2020-03-18 PROCEDURE — 96360 HYDRATION IV INFUSION INIT: CPT

## 2020-03-18 RX ORDER — SODIUM CHLORIDE 9 MG/ML
1000 INJECTION, SOLUTION INTRAVENOUS ONCE
Status: COMPLETED | OUTPATIENT
Start: 2020-03-18 | End: 2020-03-18

## 2020-03-18 NOTE — ED PROVIDER NOTES
Chief Complaint:   Patient presents with:  Fatigue    HPI:   This is a 60year old male       [address]    Patient is a 74-year-old male well known history of hypertension presents today complaining of generalized fatigue and decreased energy. Comment:Other reaction(s): Dizziness  Current Meds:  No current facility-administered medications on file prior to encounter.    Sildenafil Citrate (VIAGRA) 50 MG Oral Tab, TAKE 1 TABLET BY MOUTH ONE TIME A DAY AS NEEDED FOR ERECTILE DYSFUNCTION, Disp: 6 ta 3/18/2020 ), Disp: 1 Inhaler, Rfl: 0  Fluticasone Propionate HFA (FLOVENT HFA) 110 MCG/ACT Inhalation Aerosol, Inhale 2 puffs into the lungs 2 (two) times daily.  (Patient not taking: Reported on 3/18/2020 ), Disp: 1 Inhaler, Rfl: 0       Counseling given: 30.9 (L) 31.0 - 37.0 g/dL    PLT .0 150.0 - 450.0 X10ˆ3/uL    # Neutrophil 4.9 1.5 - 7.7 X10ˆ3/uL    # Lymphocyte 3.2 1.0 - 4.0 X10ˆ3/uL    # Mixed Cells 0.7 0.1 - 1.0 X10ˆ3/uL    Neutrophil % 56.5 %    Lymphocyte % 36.1 %    Mixed Cell % 7.4 %   COM daytime somnolence and fatigue. Work-up today has been negative. Chest x-ray is unremarkable. EKG shows no acute changes. Advised rest push fluids.   Go to the emergency room if there is any worsening of symptoms including uncontrolled fevers nausea or

## 2020-03-18 NOTE — ED INITIAL ASSESSMENT (HPI)
Has been feeling fatigued for the last one week. States had been having headaches. States has been having breathing difficulty when taking deep breaths.

## 2020-03-19 LAB
ATRIAL RATE: 70 BPM
P AXIS: 23 DEGREES
P-R INTERVAL: 238 MS
Q-T INTERVAL: 406 MS
QRS DURATION: 92 MS
QTC CALCULATION (BEZET): 438 MS
R AXIS: 19 DEGREES
T AXIS: 0 DEGREES
VENTRICULAR RATE: 70 BPM

## 2020-03-19 NOTE — ED NOTES
Called to patient, message left to voicemail to call [facility] back.   (this is to notify of final Comp Metabolic Panel result.)

## 2020-04-08 ENCOUNTER — HOSPITAL ENCOUNTER (OUTPATIENT)
Age: 65
Discharge: ANOTHER HOSPITAL | End: 2020-04-08
Attending: FAMILY MEDICINE
Payer: COMMERCIAL

## 2020-04-08 ENCOUNTER — APPOINTMENT (OUTPATIENT)
Dept: GENERAL RADIOLOGY | Age: 65
End: 2020-04-08
Attending: FAMILY MEDICINE
Payer: COMMERCIAL

## 2020-04-08 ENCOUNTER — HOSPITAL ENCOUNTER (EMERGENCY)
Facility: HOSPITAL | Age: 65
Discharge: HOME | End: 2020-04-08
Attending: EMERGENCY MEDICINE
Payer: COMMERCIAL

## 2020-04-08 VITALS
BODY MASS INDEX: 42 KG/M2 | HEART RATE: 63 BPM | RESPIRATION RATE: 18 BRPM | SYSTOLIC BLOOD PRESSURE: 135 MMHG | DIASTOLIC BLOOD PRESSURE: 97 MMHG | HEIGHT: 71 IN | OXYGEN SATURATION: 96 % | WEIGHT: 300 LBS | TEMPERATURE: 98 F

## 2020-04-08 VITALS
DIASTOLIC BLOOD PRESSURE: 76 MMHG | WEIGHT: 300 LBS | TEMPERATURE: 99 F | SYSTOLIC BLOOD PRESSURE: 142 MMHG | BODY MASS INDEX: 41 KG/M2 | RESPIRATION RATE: 16 BRPM | HEART RATE: 72 BPM | OXYGEN SATURATION: 97 %

## 2020-04-08 DIAGNOSIS — R07.9 ACUTE CHEST PAIN: Primary | ICD-10-CM

## 2020-04-08 DIAGNOSIS — R07.89 LEFT CHEST PRESSURE: Primary | ICD-10-CM

## 2020-04-08 PROCEDURE — 84484 ASSAY OF TROPONIN QUANT: CPT

## 2020-04-08 PROCEDURE — 71046 X-RAY EXAM CHEST 2 VIEWS: CPT | Performed by: FAMILY MEDICINE

## 2020-04-08 PROCEDURE — 85378 FIBRIN DEGRADE SEMIQUANT: CPT | Performed by: FAMILY MEDICINE

## 2020-04-08 PROCEDURE — 36415 COLL VENOUS BLD VENIPUNCTURE: CPT

## 2020-04-08 PROCEDURE — 85025 COMPLETE CBC W/AUTO DIFF WBC: CPT | Performed by: FAMILY MEDICINE

## 2020-04-08 PROCEDURE — 93005 ELECTROCARDIOGRAM TRACING: CPT

## 2020-04-08 PROCEDURE — 99215 OFFICE O/P EST HI 40 MIN: CPT

## 2020-04-08 PROCEDURE — 84484 ASSAY OF TROPONIN QUANT: CPT | Performed by: PHYSICIAN ASSISTANT

## 2020-04-08 PROCEDURE — 93010 ELECTROCARDIOGRAM REPORT: CPT

## 2020-04-08 PROCEDURE — 99285 EMERGENCY DEPT VISIT HI MDM: CPT

## 2020-04-08 PROCEDURE — 80053 COMPREHEN METABOLIC PANEL: CPT | Performed by: PHYSICIAN ASSISTANT

## 2020-04-08 PROCEDURE — 99284 EMERGENCY DEPT VISIT MOD MDM: CPT

## 2020-04-08 RX ORDER — NITROGLYCERIN 0.4 MG/1
0.4 TABLET SUBLINGUAL ONCE
Status: DISCONTINUED | OUTPATIENT
Start: 2020-04-08 | End: 2020-04-08

## 2020-04-08 RX ORDER — MAGNESIUM HYDROXIDE/ALUMINUM HYDROXICE/SIMETHICONE 120; 1200; 1200 MG/30ML; MG/30ML; MG/30ML
30 SUSPENSION ORAL ONCE
Status: COMPLETED | OUTPATIENT
Start: 2020-04-08 | End: 2020-04-08

## 2020-04-08 RX ORDER — LIDOCAINE HYDROCHLORIDE 20 MG/ML
10 SOLUTION OROPHARYNGEAL ONCE
Status: COMPLETED | OUTPATIENT
Start: 2020-04-08 | End: 2020-04-08

## 2020-04-09 NOTE — ED PROVIDER NOTES
Patient [facility]: [facility]      History   Patient presents with:  Chest Pain Angina    Stated Complaint: chest pain    HPI    24-year-old male with previous history of A. fib, hypertension presents with complaints of left-sided 98.6 °F (37 °C)   Temp src Oral   SpO2 97 %   O2 Device None (Room air)       Current:/76   Pulse 72   Temp 98.6 °F (37 °C) (Oral)   Resp 16   Wt 136.1 kg   SpO2 97%   BMI 40.69 kg/m²         Physical Exam    Patient is alert oriented ×3 in no acute with nausea, diaphoresis, some pleuritic chest pain. Patient has moderate risk factors for CAD. His EKG shows no ST or T evaluation his initial troponin and d-dimer is negative. With his risk factors and him to the ER rule out ACS.   Chest x-ray [facility]

## 2020-04-09 NOTE — ED NOTES
Pt denies any chest pain at this time. States it only hurts when he moves. MD made aware and to hold nitro tablet.

## 2020-04-09 NOTE — ED INITIAL ASSESSMENT (HPI)
Complains of left anterior chest pain since last night. Patent states that the pain has been constant in nuture however, barnes change I intensity. Patient barnes admit to shortness and nausea.

## 2020-04-09 NOTE — ED INITIAL ASSESSMENT (HPI)
Pt reports intermittent left sided chest pain since 7pm last night. Pt went to immediate care and sent here for further work up.

## 2020-04-09 NOTE — ED PROVIDER NOTES
Patient Seen in: [facility] Emergency Department      History   Patient presents with:  Chest Pain    Stated Complaint:     HPI    CHIEF COMPLAINT: Left-sided chest pain    HISTORY OF PRESENT ILLNESS: Patient is a pleasant 82-year-old male with a hi 2005 - medical management; 2008 - [facility] hospitalization; converted to NSR   • Bronchitis 2013    drug therapy   • Extrinsic asthma, unspecified    • Toenail fungus    • Unspecified essential hypertension               Past Surgical History:   Proced rashes. Musculoskeletal:  neck is supple non tender.  no meningeal signs        Extremities are symmetrical, full range of motion  Psychiatric: there is no agitation    ED Course     Labs Reviewed   COMP METABOLIC PANEL (14) - Abnormal; Notable for the fol pneumothorax. BONES:  Normal for age. CONCLUSION:  Minimal atelectasis noted at the left base. No focal consolidation, infiltrate or pleural effusion.     Dictated by: Hari Arevalo MD on 4/08/2020 at 8:45 PM     Finalized by: Hari Arevalo MD o diagnosis)    Disposition:  Discharge  4/8/2020 11:04 pm    Follow-up:  Morales Jarrett DO  [address] [facility] [address] [phone]    Call in 1 day  To set up an outpatient appointment time          Medications Prescribed:  Current

## 2020-04-14 RX ORDER — MULTIVITAMIN
1 CAPSULE ORAL DAILY
COMMUNITY

## 2020-04-14 RX ORDER — METOPROLOL SUCCINATE 100 MG/1
TABLET, EXTENDED RELEASE ORAL
COMMUNITY
Start: 2020-01-26

## 2020-04-14 RX ORDER — SILDENAFIL 50 MG/1
TABLET, FILM COATED ORAL
COMMUNITY
Start: 2020-02-06

## 2020-04-14 RX ORDER — ECHINACEA 400 MG
CAPSULE ORAL DAILY
COMMUNITY

## 2020-04-14 RX ORDER — AMLODIPINE BESYLATE 5 MG/1
10 TABLET ORAL
COMMUNITY
Start: 2020-01-26

## 2020-04-14 RX ORDER — SUMATRIPTAN 25 MG/1
25 TABLET, FILM COATED ORAL PRN
COMMUNITY
Start: 2019-09-17

## 2020-04-14 RX ORDER — ASPIRIN 325 MG
325 TABLET ORAL DAILY
COMMUNITY

## 2020-04-14 RX ORDER — FLUTICASONE PROPIONATE 110 UG/1
2 AEROSOL, METERED RESPIRATORY (INHALATION)
COMMUNITY
Start: 2016-03-25

## 2020-04-14 RX ORDER — LATANOPROST 50 UG/ML
1 SOLUTION/ DROPS OPHTHALMIC DAILY
COMMUNITY
Start: 2018-04-06

## 2020-04-14 RX ORDER — OLMESARTAN MEDOXOMIL AND HYDROCHLOROTHIAZIDE 40/25 40; 25 MG/1; MG/1
1 TABLET ORAL DAILY
COMMUNITY
Start: 2019-08-14 | End: 2020-08-08

## 2020-04-14 RX ORDER — HYDROCHLOROTHIAZIDE 12.5 MG/1
12.5 CAPSULE, GELATIN COATED ORAL DAILY
COMMUNITY
End: 2021-06-09 | Stop reason: ALTCHOICE

## 2020-04-14 RX ORDER — ALBUTEROL SULFATE 90 UG/1
2 AEROSOL, METERED RESPIRATORY (INHALATION)
COMMUNITY
Start: 2016-03-25

## 2020-04-14 RX ORDER — DORZOLAMIDE HCL 20 MG/ML
SOLUTION/ DROPS OPHTHALMIC 2 TIMES DAILY
COMMUNITY
Start: 2018-12-03

## 2020-04-16 ENCOUNTER — OFFICE VISIT (OUTPATIENT)
Dept: CARDIOLOGY | Age: 65
End: 2020-04-16

## 2020-04-16 VITALS
HEIGHT: 72 IN | DIASTOLIC BLOOD PRESSURE: 111 MMHG | WEIGHT: 297 LBS | SYSTOLIC BLOOD PRESSURE: 166 MMHG | HEART RATE: 60 BPM | BODY MASS INDEX: 40.23 KG/M2

## 2020-04-16 DIAGNOSIS — R07.2 PRECORDIAL PAIN: ICD-10-CM

## 2020-04-16 DIAGNOSIS — I10 ESSENTIAL HYPERTENSION: Primary | ICD-10-CM

## 2020-04-16 PROCEDURE — 3080F DIAST BP >= 90 MM HG: CPT | Performed by: INTERNAL MEDICINE

## 2020-04-16 PROCEDURE — 99243 OFF/OP CNSLTJ NEW/EST LOW 30: CPT | Performed by: INTERNAL MEDICINE

## 2020-04-16 PROCEDURE — 3077F SYST BP >= 140 MM HG: CPT | Performed by: INTERNAL MEDICINE

## 2020-04-16 RX ORDER — POTASSIUM GLUCONATE 595(99)MG
1 TABLET ORAL DAILY
COMMUNITY

## 2020-04-16 SDOH — HEALTH STABILITY: PHYSICAL HEALTH: ON AVERAGE, HOW MANY MINUTES DO YOU ENGAGE IN EXERCISE AT THIS LEVEL?: 60 MIN

## 2020-04-16 SDOH — HEALTH STABILITY: PHYSICAL HEALTH: ON AVERAGE, HOW MANY DAYS PER WEEK DO YOU ENGAGE IN MODERATE TO STRENUOUS EXERCISE (LIKE A BRISK WALK)?: 4 DAYS

## 2020-04-20 ENCOUNTER — ORDER TRANSCRIPTION (OUTPATIENT)
Dept: ADMINISTRATIVE | Facility: HOSPITAL | Age: 65
End: 2020-04-20

## 2020-04-20 DIAGNOSIS — I10 ESSENTIAL HYPERTENSION, BENIGN: Primary | ICD-10-CM

## 2020-04-20 DIAGNOSIS — R07.2 PRECORDIAL PAIN: ICD-10-CM

## 2020-04-21 ENCOUNTER — MED REC SCAN ONLY (OUTPATIENT)
Dept: INTERNAL MEDICINE CLINIC | Facility: CLINIC | Age: 65
End: 2020-04-21

## 2020-05-12 ENCOUNTER — ORDER TRANSCRIPTION (OUTPATIENT)
Dept: ADMINISTRATIVE | Facility: HOSPITAL | Age: 65
End: 2020-05-12

## 2020-05-13 ENCOUNTER — TELEPHONE (OUTPATIENT)
Dept: CARDIOLOGY | Age: 65
End: 2020-05-13

## 2020-05-15 ENCOUNTER — HOSPITAL ENCOUNTER (OUTPATIENT)
Dept: CT IMAGING | Facility: HOSPITAL | Age: 65
Discharge: HOME | End: 2020-05-15
Attending: NURSE PRACTITIONER
Payer: COMMERCIAL

## 2020-05-15 VITALS — DIASTOLIC BLOOD PRESSURE: 87 MMHG | HEART RATE: 68 BPM | RESPIRATION RATE: 18 BRPM | SYSTOLIC BLOOD PRESSURE: 129 MMHG

## 2020-05-15 DIAGNOSIS — I10 ESSENTIAL HYPERTENSION, BENIGN: ICD-10-CM

## 2020-05-15 DIAGNOSIS — R07.2 PRECORDIAL PAIN: ICD-10-CM

## 2020-05-15 PROCEDURE — 82565 ASSAY OF CREATININE: CPT

## 2020-05-15 PROCEDURE — 75574 CT ANGIO HRT W/3D IMAGE: CPT | Performed by: INTERNAL MEDICINE

## 2020-05-15 PROCEDURE — 75574 CT ANGIO HRT W/3D IMAGE: CPT | Performed by: NURSE PRACTITIONER

## 2020-05-15 RX ORDER — METOPROLOL TARTRATE 5 MG/5ML
INJECTION INTRAVENOUS
Status: COMPLETED
Start: 2020-05-15 | End: 2020-05-15

## 2020-05-15 RX ORDER — NITROGLYCERIN 0.4 MG/1
TABLET SUBLINGUAL
Status: COMPLETED
Start: 2020-05-15 | End: 2020-05-15

## 2020-05-15 RX ADMIN — METOPROLOL TARTRATE: 5 INJECTION INTRAVENOUS at 10:44:00

## 2020-05-15 RX ADMIN — NITROGLYCERIN: 0.4 TABLET SUBLINGUAL at 10:51:00

## 2020-05-15 NOTE — IMAGING NOTE
Recieved pt to rad holidng s/p CTA gated coronary. Pt reports slight pressure to back of head and slight lightheadedness. VSS at that time. Pt reports needs to void. Pt refuses w/c to ambulate to restroom.   Pt tolerated walking, steady gait accompanied

## 2020-05-15 NOTE — IMAGING NOTE
Scheduled for CT gated coronary. Denies any allergies to contrast. HR 62 during coronary scan. Tolerated exam well.

## 2020-05-18 ENCOUNTER — TELEPHONE (OUTPATIENT)
Dept: CARDIOLOGY | Age: 65
End: 2020-05-18

## 2020-06-11 ENCOUNTER — HOSPITAL ENCOUNTER (OUTPATIENT)
Dept: CV DIAGNOSTICS | Facility: HOSPITAL | Age: 65
Discharge: HOME | End: 2020-06-11
Attending: NURSE PRACTITIONER
Payer: COMMERCIAL

## 2020-06-11 DIAGNOSIS — I10 HYPERTENSION, ESSENTIAL: ICD-10-CM

## 2020-06-11 DIAGNOSIS — R07.2 PRECORDIAL PAIN: ICD-10-CM

## 2020-06-11 PROCEDURE — 96374 THER/PROPH/DIAG INJ IV PUSH: CPT | Performed by: INTERNAL MEDICINE

## 2020-06-11 PROCEDURE — 93306 TTE W/DOPPLER COMPLETE: CPT | Performed by: INTERNAL MEDICINE

## 2020-06-12 ENCOUNTER — TELEPHONE (OUTPATIENT)
Dept: CARDIOLOGY | Age: 65
End: 2020-06-12

## 2020-06-22 ENCOUNTER — TELEPHONE (OUTPATIENT)
Dept: INTERNAL MEDICINE CLINIC | Facility: CLINIC | Age: 65
End: 2020-06-22

## 2020-06-22 NOTE — TELEPHONE ENCOUNTER
Patient dropped off FMLA, He signed HIPPA and paid $15 renewal fee. Patient would like completed paperwork to be faxed and he would like to  a copy.  Scanned to [employer]

## 2020-06-29 ENCOUNTER — OFFICE VISIT (OUTPATIENT)
Dept: CARDIOLOGY | Age: 65
End: 2020-06-29

## 2020-06-29 VITALS
DIASTOLIC BLOOD PRESSURE: 85 MMHG | HEART RATE: 65 BPM | HEIGHT: 72 IN | SYSTOLIC BLOOD PRESSURE: 121 MMHG | BODY MASS INDEX: 40.36 KG/M2 | WEIGHT: 298 LBS

## 2020-06-29 DIAGNOSIS — I10 ESSENTIAL HYPERTENSION: ICD-10-CM

## 2020-06-29 DIAGNOSIS — R07.2 PRECORDIAL PAIN: Primary | ICD-10-CM

## 2020-06-29 PROCEDURE — 99213 OFFICE O/P EST LOW 20 MIN: CPT | Performed by: INTERNAL MEDICINE

## 2020-06-29 SDOH — HEALTH STABILITY: MENTAL HEALTH: HOW OFTEN DO YOU HAVE A DRINK CONTAINING ALCOHOL?: 2-4 TIMES A MONTH

## 2020-06-29 SDOH — HEALTH STABILITY: PHYSICAL HEALTH: ON AVERAGE, HOW MANY MINUTES DO YOU ENGAGE IN EXERCISE AT THIS LEVEL?: 60 MIN

## 2020-06-29 SDOH — HEALTH STABILITY: MENTAL HEALTH: HOW MANY STANDARD DRINKS CONTAINING ALCOHOL DO YOU HAVE ON A TYPICAL DAY?: 1 OR 2

## 2020-06-29 SDOH — HEALTH STABILITY: PHYSICAL HEALTH: ON AVERAGE, HOW MANY DAYS PER WEEK DO YOU ENGAGE IN MODERATE TO STRENUOUS EXERCISE (LIKE A BRISK WALK)?: 3 DAYS

## 2020-06-29 ASSESSMENT — ENCOUNTER SYMPTOMS
WEIGHT GAIN: 0
FEVER: 0
BRUISES/BLEEDS EASILY: 0
SUSPICIOUS LESIONS: 0
CHILLS: 0
HEMATOCHEZIA: 0
WEIGHT LOSS: 0
COUGH: 0
ALLERGIC/IMMUNOLOGIC COMMENTS: NO NEW FOOD ALLERGIES
HEMOPTYSIS: 0
HEADACHES: 1
DIZZINESS: 1

## 2020-06-30 NOTE — TELEPHONE ENCOUNTER
FMLA recert for Asthma - informed pt that he needs at least 2 visits per year for the medical condition for FMLA.      Start date: 6/15/20

## 2020-07-02 ENCOUNTER — MED REC SCAN ONLY (OUTPATIENT)
Dept: INTERNAL MEDICINE CLINIC | Facility: CLINIC | Age: 65
End: 2020-07-02

## 2020-07-06 NOTE — TELEPHONE ENCOUNTER
Dr. Chika Nguyen,     Please sign off on form: FMLA re certification with the same information as last year:   1 flare up per month, an episode lasting 1-72 hours  1-2 appts per year    -Highlight the patient and hit \"Chart\" button.   -In Chart Review, w/in th

## 2020-07-20 ENCOUNTER — OFFICE VISIT (OUTPATIENT)
Dept: INTERNAL MEDICINE CLINIC | Facility: CLINIC | Age: 65
End: 2020-07-20
Payer: COMMERCIAL

## 2020-07-20 VITALS
HEART RATE: 71 BPM | DIASTOLIC BLOOD PRESSURE: 98 MMHG | WEIGHT: 304 LBS | HEIGHT: 71 IN | BODY MASS INDEX: 42.56 KG/M2 | SYSTOLIC BLOOD PRESSURE: 150 MMHG

## 2020-07-20 DIAGNOSIS — J45.20 MILD INTERMITTENT ASTHMA WITHOUT COMPLICATION: ICD-10-CM

## 2020-07-20 DIAGNOSIS — G43.509 PERSISTENT MIGRAINE AURA WITHOUT CEREBRAL INFARCTION AND WITHOUT STATUS MIGRAINOSUS, NOT INTRACTABLE: ICD-10-CM

## 2020-07-20 DIAGNOSIS — Z12.5 SCREENING FOR PROSTATE CANCER: ICD-10-CM

## 2020-07-20 DIAGNOSIS — E66.01 CLASS 3 SEVERE OBESITY DUE TO EXCESS CALORIES WITHOUT SERIOUS COMORBIDITY WITH BODY MASS INDEX (BMI) OF 40.0 TO 44.9 IN ADULT (HCC): ICD-10-CM

## 2020-07-20 DIAGNOSIS — I10 ESSENTIAL HYPERTENSION: ICD-10-CM

## 2020-07-20 DIAGNOSIS — Z00.00 ROUTINE PHYSICAL EXAMINATION: Primary | ICD-10-CM

## 2020-07-20 DIAGNOSIS — K59.04 CHRONIC IDIOPATHIC CONSTIPATION: ICD-10-CM

## 2020-07-20 PROBLEM — S76.112A QUADRICEPS TENDON RUPTURE, LEFT, INITIAL ENCOUNTER: Status: RESOLVED | Noted: 2017-01-12 | Resolved: 2020-07-20

## 2020-07-20 PROCEDURE — 3008F BODY MASS INDEX DOCD: CPT | Performed by: INTERNAL MEDICINE

## 2020-07-20 PROCEDURE — 99396 PREV VISIT EST AGE 40-64: CPT | Performed by: INTERNAL MEDICINE

## 2020-07-20 PROCEDURE — 3080F DIAST BP >= 90 MM HG: CPT | Performed by: INTERNAL MEDICINE

## 2020-07-20 PROCEDURE — 3077F SYST BP >= 140 MM HG: CPT | Performed by: INTERNAL MEDICINE

## 2020-07-20 NOTE — TELEPHONE ENCOUNTER
Dr. Elliott Grade,    This is the same FMLA as your previously signed but pt's employer denied it due to be one day late of when it was due. Please sign off on form:  FMLA re certification same information as last yr    -Highlight the patient and hit \"Chart

## 2020-07-23 NOTE — PROGRESS NOTES
HPI:    Patient ID: Gage Curtis is a 63year old male. HPI  Patient is here requesting a general physical exam and follow-up on chronic medical issues as listed below.   Last seen in the office in June 2019 for the physical exam.  Since that time he is kidney cancer   • Hypertension Mother    • Hypertension Father    • Prostate Cancer Father 68   • Cancer Father    • Diabetes Paternal Grandfather    • Arthritis Daughter         rheumatoid   • Heart Disease Paternal Grandmother         premature CAD   • C brimonidine Tartrate 0.2 % Ophthalmic Solution INSTILL 1 DROP TWO TIMES A DAY IN RIGHT EYE  6   • latanoprost 0.005 % Ophthalmic Solution Place 1 drop into both eyes nightly.      • Albuterol Sulfate HFA (PROVENTIL HFA) 108 (90 BASE) MCG/ACT Inhalation Aero negative in the left inguinal area. Genitourinary:    Testes and penis normal.   Musculoskeletal: He exhibits no tenderness. Lymphadenopathy:     He has no cervical adenopathy. Right: No inguinal adenopathy present.         Left: No inguinal john the need for long-term commitment to diet, exercise, weight loss. Discussed various strategies. 6. Persistent migraine aura without cerebral infarction and without status migrainosus, not intractable  Nothing active for the last year. Monitor for now.

## 2020-07-28 ENCOUNTER — LAB ENCOUNTER (OUTPATIENT)
Dept: LAB | Facility: HOSPITAL | Age: 65
End: 2020-07-28
Attending: INTERNAL MEDICINE
Payer: COMMERCIAL

## 2020-07-28 ENCOUNTER — TELEPHONE (OUTPATIENT)
Dept: INTERNAL MEDICINE CLINIC | Facility: CLINIC | Age: 65
End: 2020-07-28

## 2020-07-28 DIAGNOSIS — Z00.00 ROUTINE PHYSICAL EXAMINATION: ICD-10-CM

## 2020-07-28 LAB
ALBUMIN SERPL-MCNC: 3.5 G/DL (ref 3.4–5)
ALBUMIN/GLOB SERPL: 0.9 {RATIO} (ref 1–2)
ALP LIVER SERPL-CCNC: 49 U/L (ref 45–117)
ALT SERPL-CCNC: 23 U/L (ref 16–61)
ANION GAP SERPL CALC-SCNC: 7 MMOL/L (ref 0–18)
AST SERPL-CCNC: 16 U/L (ref 15–37)
BASOPHILS # BLD AUTO: 0.06 X10(3) UL (ref 0–0.2)
BASOPHILS NFR BLD AUTO: 0.9 %
BILIRUB SERPL-MCNC: 0.4 MG/DL (ref 0.1–2)
BUN BLD-MCNC: 13 MG/DL (ref 7–18)
BUN/CREAT SERPL: 12.6 (ref 10–20)
CALCIUM BLD-MCNC: 9.2 MG/DL (ref 8.5–10.1)
CHLORIDE SERPL-SCNC: 107 MMOL/L (ref 98–112)
CHOLEST SMN-MCNC: 186 MG/DL (ref ?–200)
CO2 SERPL-SCNC: 28 MMOL/L (ref 21–32)
COMPLEXED PSA SERPL-MCNC: 1.7 NG/ML (ref ?–4)
CREAT BLD-MCNC: 1.03 MG/DL (ref 0.7–1.3)
DEPRECATED RDW RBC AUTO: 46.5 FL (ref 35.1–46.3)
EOSINOPHIL # BLD AUTO: 0.15 X10(3) UL (ref 0–0.7)
EOSINOPHIL NFR BLD AUTO: 2.2 %
ERYTHROCYTE [DISTWIDTH] IN BLOOD BY AUTOMATED COUNT: 18.6 % (ref 11–15)
EST. AVERAGE GLUCOSE BLD GHB EST-MCNC: 114 MG/DL (ref 68–126)
GLOBULIN PLAS-MCNC: 3.7 G/DL (ref 2.8–4.4)
GLUCOSE BLD-MCNC: 83 MG/DL (ref 70–99)
HBA1C MFR BLD HPLC: 5.6 % (ref ?–5.7)
HCT VFR BLD AUTO: 45.8 % (ref 39–53)
HDLC SERPL-MCNC: 46 MG/DL (ref 40–59)
HGB BLD-MCNC: 14.7 G/DL (ref 13–17.5)
IMM GRANULOCYTES # BLD AUTO: 0.02 X10(3) UL (ref 0–1)
IMM GRANULOCYTES NFR BLD: 0.3 %
LDLC SERPL CALC-MCNC: 120 MG/DL (ref ?–100)
LYMPHOCYTES # BLD AUTO: 2.56 X10(3) UL (ref 1–4)
LYMPHOCYTES NFR BLD AUTO: 38.2 %
M PROTEIN MFR SERPL ELPH: 7.2 G/DL (ref 6.4–8.2)
MCH RBC QN AUTO: 24.1 PG (ref 26–34)
MCHC RBC AUTO-ENTMCNC: 32.1 G/DL (ref 31–37)
MCV RBC AUTO: 75.1 FL (ref 80–100)
MONOCYTES # BLD AUTO: 0.5 X10(3) UL (ref 0.1–1)
MONOCYTES NFR BLD AUTO: 7.5 %
NEUTROPHILS # BLD AUTO: 3.42 X10 (3) UL (ref 1.5–7.7)
NEUTROPHILS # BLD AUTO: 3.42 X10(3) UL (ref 1.5–7.7)
NEUTROPHILS NFR BLD AUTO: 50.9 %
NONHDLC SERPL-MCNC: 140 MG/DL (ref ?–130)
OSMOLALITY SERPL CALC.SUM OF ELEC: 293 MOSM/KG (ref 275–295)
PATIENT FASTING Y/N/NP: YES
PATIENT FASTING Y/N/NP: YES
PLATELET # BLD AUTO: 332 10(3)UL (ref 150–450)
POTASSIUM SERPL-SCNC: 3.9 MMOL/L (ref 3.5–5.1)
RBC # BLD AUTO: 6.1 X10(6)UL (ref 4.3–5.7)
SODIUM SERPL-SCNC: 142 MMOL/L (ref 136–145)
TRIGL SERPL-MCNC: 100 MG/DL (ref 30–149)
TSI SER-ACNC: 2.79 MIU/ML (ref 0.36–3.74)
VLDLC SERPL CALC-MCNC: 20 MG/DL (ref 0–30)
WBC # BLD AUTO: 6.7 X10(3) UL (ref 4–11)

## 2020-07-28 PROCEDURE — 36415 COLL VENOUS BLD VENIPUNCTURE: CPT

## 2020-07-28 PROCEDURE — 85025 COMPLETE CBC W/AUTO DIFF WBC: CPT

## 2020-07-28 PROCEDURE — 83036 HEMOGLOBIN GLYCOSYLATED A1C: CPT

## 2020-07-28 PROCEDURE — 80053 COMPREHEN METABOLIC PANEL: CPT

## 2020-07-28 PROCEDURE — 84443 ASSAY THYROID STIM HORMONE: CPT

## 2020-07-28 PROCEDURE — 85060 BLOOD SMEAR INTERPRETATION: CPT

## 2020-07-28 PROCEDURE — 80061 LIPID PANEL: CPT

## 2020-07-28 NOTE — TELEPHONE ENCOUNTER
Patient called in requesting a copy of completed and signed FMLA forms. He states that he is coming to the office now to pick it up after CSS advised that she was not sure the office could send fax to personal number based on HIPAA regulations.      Ple

## 2020-08-18 RX ORDER — SILDENAFIL 50 MG/1
TABLET, FILM COATED ORAL
Qty: 6 TABLET | Refills: 5 | Status: SHIPPED | OUTPATIENT
Start: 2020-08-18 | End: 2021-06-29

## 2020-08-18 RX ORDER — OLMESARTAN MEDOXOMIL AND HYDROCHLOROTHIAZIDE 40/25 40; 25 MG/1; MG/1
TABLET ORAL
Qty: 90 TABLET | Refills: 3 | Status: SHIPPED | OUTPATIENT
Start: 2020-08-18 | End: 2021-08-23

## 2020-08-18 RX ORDER — METOPROLOL SUCCINATE 100 MG/1
TABLET, EXTENDED RELEASE ORAL
Qty: 90 TABLET | Refills: 3 | Status: SHIPPED | OUTPATIENT
Start: 2020-08-18 | End: 2021-06-13

## 2020-08-18 RX ORDER — AMLODIPINE BESYLATE 5 MG/1
TABLET ORAL
Qty: 90 TABLET | Refills: 3 | Status: SHIPPED | OUTPATIENT
Start: 2020-08-18 | End: 2021-06-13

## 2020-10-27 ENCOUNTER — MED REC SCAN ONLY (OUTPATIENT)
Dept: INTERNAL MEDICINE CLINIC | Facility: CLINIC | Age: 65
End: 2020-10-27

## 2020-10-27 ENCOUNTER — IMMUNIZATION (OUTPATIENT)
Dept: INTERNAL MEDICINE CLINIC | Facility: CLINIC | Age: 65
End: 2020-10-27
Payer: COMMERCIAL

## 2020-10-27 DIAGNOSIS — Z23 NEED FOR VACCINATION: ICD-10-CM

## 2020-10-27 PROCEDURE — 90471 IMMUNIZATION ADMIN: CPT | Performed by: INTERNAL MEDICINE

## 2020-10-27 PROCEDURE — 90686 IIV4 VACC NO PRSV 0.5 ML IM: CPT | Performed by: INTERNAL MEDICINE

## 2020-10-28 NOTE — H&P
History & Physical Examination    Patient Name: Mercedes Rodriguez  MRN: [id number]  CSN: [id number]  YOB: 1955    Diagnosis: Personal history of adenomatous colon polyps, colorectal cancer screening, rectal bleeding        Medications Prior to [facility] lungs every 4 (four) hours as needed. Disp: 1 Inhaler Rfl: 0 9/25/2019   Fluticasone Propionate HFA (FLOVENT HFA) 110 MCG/ACT Inhalation Aerosol Inhale 2 puffs into the lungs 2 (two) times daily.  Disp: 1 Inhaler Rfl: 0 9/25/2019   Flaxseed, Linseed, (FLAXS Yes      Comment: occ      SYSTEM Check if Review is Normal Check if Physical Exam is Normal If not normal, please explain:   [place] ] [ Bisi Alvarado  [facility] [place] ] [ Jackie Garcias    HEART [facility] [place] ] [ Kim Alvares [facility] ] [ Tarah Mason [facility] ] [ Micaela Garcia [facility] ] [ Hussain Deng Benzoyl Peroxide Counseling: Patient counseled that medicine may cause skin irritation and bleach clothing.  In the event of skin irritation, the patient was advised to reduce the amount of the drug applied or use it less frequently.   The patient verbalized understanding of the proper use and possible adverse effects of benzoyl peroxide.  All of the patient's questions and concerns were addressed. Tazorac Pregnancy And Lactation Text: This medication is not safe during pregnancy. It is unknown if this medication is excreted in breast milk. High Dose Vitamin A Pregnancy And Lactation Text: High dose vitamin A therapy is contraindicated during pregnancy and breast feeding. Azithromycin Pregnancy And Lactation Text: This medication is considered safe during pregnancy and is also secreted in breast milk. Topical Sulfur Applications Pregnancy And Lactation Text: This medication is Pregnancy Category C and has an unknown safety profile during pregnancy. It is unknown if this topical medication is excreted in breast milk. Dapsone Counseling: I discussed with the patient the risks of dapsone including but not limited to hemolytic anemia, agranulocytosis, rashes, methemoglobinemia, kidney failure, peripheral neuropathy, headaches, GI upset, and liver toxicity.  Patients who start dapsone require monitoring including baseline LFTs and weekly CBCs for the first month, then every month thereafter.  The patient verbalized understanding of the proper use and possible adverse effects of dapsone.  All of the patient's questions and concerns were addressed. Bactrim Counseling:  I discussed with the patient the risks of sulfa antibiotics including but not limited to GI upset, allergic reaction, drug rash, diarrhea, dizziness, photosensitivity, and yeast infections.  Rarely, more serious reactions can occur including but not limited to aplastic anemia, agranulocytosis, methemoglobinemia, blood dyscrasias, liver or kidney failure, lung infiltrates or desquamative/blistering drug rashes. Spironolactone Counseling: Patient advised regarding risks of diarrhea, abdominal pain, hyperkalemia, birth defects (for female patients), liver toxicity and renal toxicity. The patient may need blood work to monitor liver and kidney function and potassium levels while on therapy. The patient verbalized understanding of the proper use and possible adverse effects of spironolactone.  All of the patient's questions and concerns were addressed. Erythromycin Pregnancy And Lactation Text: This medication is Pregnancy Category B and is considered safe during pregnancy. It is also excreted in breast milk. Minocycline Counseling: Patient advised regarding possible photosensitivity and discoloration of the teeth, skin, lips, tongue and gums.  Patient instructed to avoid sunlight, if possible.  When exposed to sunlight, patients should wear protective clothing, sunglasses, and sunscreen.  The patient was instructed to call the office immediately if the following severe adverse effects occur:  hearing changes, easy bruising/bleeding, severe headache, or vision changes.  The patient verbalized understanding of the proper use and possible adverse effects of minocycline.  All of the patient's questions and concerns were addressed. Dapsone Pregnancy And Lactation Text: This medication is Pregnancy Category C and is not considered safe during pregnancy or breast feeding. Spironolactone Pregnancy And Lactation Text: This medication can cause feminization of the male fetus and should be avoided during pregnancy. The active metabolite is also found in breast milk. Benzoyl Peroxide Pregnancy And Lactation Text: This medication is Pregnancy Category C. It is unknown if benzoyl peroxide is excreted in breast milk. Use Enhanced Medication Counseling?: No Topical Retinoid counseling:  Patient advised to apply a pea-sized amount only at bedtime and wait 30 minutes after washing their face before applying.  If too drying, patient may add a non-comedogenic moisturizer. The patient verbalized understanding of the proper use and possible adverse effects of retinoids.  All of the patient's questions and concerns were addressed. Bactrim Pregnancy And Lactation Text: This medication is Pregnancy Category D and is known to cause fetal risk.  It is also excreted in breast milk. Isotretinoin Counseling: Patient should get monthly blood tests, not donate blood, not drive at night if vision affected, not share medication, and not undergo elective surgery for 6 months after tx completed. Side effects reviewed, pt to contact office should one occur. Topical Clindamycin Counseling: Patient counseled that this medication may cause skin irritation or allergic reactions.  In the event of skin irritation, the patient was advised to reduce the amount of the drug applied or use it less frequently.   The patient verbalized understanding of the proper use and possible adverse effects of clindamycin.  All of the patient's questions and concerns were addressed. Doxycycline Counseling:  Patient counseled regarding possible photosensitivity and increased risk for sunburn.  Patient instructed to avoid sunlight, if possible.  When exposed to sunlight, patients should wear protective clothing, sunglasses, and sunscreen.  The patient was instructed to call the office immediately if the following severe adverse effects occur:  hearing changes, easy bruising/bleeding, severe headache, or vision changes.  The patient verbalized understanding of the proper use and possible adverse effects of doxycycline.  All of the patient's questions and concerns were addressed. Isotretinoin Pregnancy And Lactation Text: This medication is Pregnancy Category X and is considered extremely dangerous during pregnancy. It is unknown if it is excreted in breast milk. Topical Clindamycin Pregnancy And Lactation Text: This medication is Pregnancy Category B and is considered safe during pregnancy. It is unknown if it is excreted in breast milk. Minocycline Pregnancy And Lactation Text: This medication is Pregnancy Category D and not consider safe during pregnancy. It is also excreted in breast milk. Topical Retinoid Pregnancy And Lactation Text: This medication is Pregnancy Category C. It is unknown if this medication is excreted in breast milk. Sarecycline Counseling: Patient advised regarding possible photosensitivity and discoloration of the teeth, skin, lips, tongue and gums.  Patient instructed to avoid sunlight, if possible.  When exposed to sunlight, patients should wear protective clothing, sunglasses, and sunscreen.  The patient was instructed to call the office immediately if the following severe adverse effects occur:  hearing changes, easy bruising/bleeding, severe headache, or vision changes.  The patient verbalized understanding of the proper use and possible adverse effects of sarecycline.  All of the patient's questions and concerns were addressed. Detail Level: Zone Tetracycline Counseling: Patient counseled regarding possible photosensitivity and increased risk for sunburn.  Patient instructed to avoid sunlight, if possible.  When exposed to sunlight, patients should wear protective clothing, sunglasses, and sunscreen.  The patient was instructed to call the office immediately if the following severe adverse effects occur:  hearing changes, easy bruising/bleeding, severe headache, or vision changes.  The patient verbalized understanding of the proper use and possible adverse effects of tetracycline.  All of the patient's questions and concerns were addressed. Patient understands to avoid pregnancy while on therapy due to potential birth defects. Birth Control Pills Counseling: Birth Control Pill Counseling: I discussed with the patient the potential side effects of OCPs including but not limited to increased risk of stroke, heart attack, thrombophlebitis, deep venous thrombosis, hepatic adenomas, breast changes, GI upset, headaches, and depression.  The patient verbalized understanding of the proper use and possible adverse effects of OCPs. All of the patient's questions and concerns were addressed. Topical Sulfur Applications Counseling: Topical Sulfur Counseling: Patient counseled that this medication may cause skin irritation or allergic reactions.  In the event of skin irritation, the patient was advised to reduce the amount of the drug applied or use it less frequently.   The patient verbalized understanding of the proper use and possible adverse effects of topical sulfur application.  All of the patient's questions and concerns were addressed. Birth Control Pills Pregnancy And Lactation Text: This medication should be avoided if pregnant and for the first 30 days post-partum. Azithromycin Counseling:  I discussed with the patient the risks of azithromycin including but not limited to GI upset, allergic reaction, drug rash, diarrhea, and yeast infections. Doxycycline Pregnancy And Lactation Text: This medication is Pregnancy Category D and not consider safe during pregnancy. It is also excreted in breast milk but is considered safe for shorter treatment courses. Erythromycin Counseling:  I discussed with the patient the risks of erythromycin including but not limited to GI upset, allergic reaction, drug rash, diarrhea, increase in liver enzymes, and yeast infections. High Dose Vitamin A Counseling: Side effects reviewed, pt to contact office should one occur. Tazorac Counseling:  Patient advised that medication is irritating and drying.  Patient may need to apply sparingly and wash off after an hour before eventually leaving it on overnight.  The patient verbalized understanding of the proper use and possible adverse effects of tazorac.  All of the patient's questions and concerns were addressed.

## 2020-12-17 ENCOUNTER — TELEPHONE (OUTPATIENT)
Dept: INTERNAL MEDICINE CLINIC | Facility: CLINIC | Age: 65
End: 2020-12-17

## 2020-12-17 DIAGNOSIS — Z91.89 AT INCREASED RISK OF EXPOSURE TO COVID-19 VIRUS: Primary | ICD-10-CM

## 2020-12-17 DIAGNOSIS — Z20.828 EXPOSURE TO SARS-ASSOCIATED CORONAVIRUS: Primary | ICD-10-CM

## 2020-12-17 NOTE — TELEPHONE ENCOUNTER
Pt states that he does not have any symptoms, but, his wife is being tested today. Pt would like to know if the doctor can give him an order to be tested. Pt seen and examined at bedside. Pt states mild abdominal pain. Pt is not yet ambulating, tolerating regular diet, not yet passing flatus, had siu catheter removed this AM.  Pt denies fever, chills, chest pain, SOB, nausea, vomiting, lightheadedness, dizziness.      T(F): 98.2 (12-15-18 @ 05:00), Max: 99.4 (12-15-18 @ 00:55)  HR: 70 (12-15-18 @ 05:00) (62 - 100)  BP: 102/68 (12-15-18 @ 05:00) (102/68 - 131/68)  RR: 16 (12-15-18 @ 05:00) (14 - 39)  SpO2: 98% (12-15-18 @ 05:00) (95% - 100%)  Wt(kg): --  I&O's Summary    14 Dec 2018 07:01  -  15 Dec 2018 07:00  --------------------------------------------------------  IN: 560 mL / OUT: 250 mL / NET: 310 mL        MEDICATIONS  (STANDING):  acetaminophen   Tablet .. 1000 milliGRAM(s) Oral every 8 hours  ALBUTerol    90 MICROgram(s) HFA Inhaler 1 Puff(s) Inhalation every 4 hours  ALBUTerol/ipratropium for Nebulization 3 milliLiter(s) Nebulizer every 6 hours  BUpivacaine liposome 1.3% Injectable (no eMAR) 20 milliLiter(s) Local Injection once  docusate sodium 100 milliGRAM(s) Oral three times a day  enoxaparin Injectable 40 milliGRAM(s) SubCutaneous every 24 hours  influenza   Vaccine 0.5 milliLiter(s) IntraMuscular once  ketorolac   Injectable 30 milliGRAM(s) IV Push every 8 hours  lactated ringers. 1000 milliLiter(s) (125 mL/Hr) IV Continuous <Continuous>  ondansetron Injectable 4 milliGRAM(s) IV Push every 8 hours  tiotropium 18 MICROgram(s) Capsule 1 Capsule(s) Inhalation daily    MEDICATIONS  (PRN):  metoclopramide Injectable 10 milliGRAM(s) IV Push every 6 hours PRN Nausea and/or Vomiting  oxyCODONE    IR 5 milliGRAM(s) Oral every 4 hours PRN Moderate Pain (4 - 6)  oxyCODONE    IR 10 milliGRAM(s) Oral every 6 hours PRN Severe Pain (7 - 10)      Physical Exam:  Constitutional: NAD  Pulmonary: regular work of breathing  Abdomen: incision site clean, dry, intact. Soft, mildly tender, mildly distended, no guarding, no rebound, +bowel sounds  Extremities: no lower extremity edema or calve tenderness. SCDs in place     LABS:                        11.9   11.1  )-----------( 273      ( 15 Dec 2018 07:17 )             37.0     12-15    140  |  103  |  9   ----------------------------<  111<H>  4.4   |  24  |  1.04    Ca    8.6      15 Dec 2018 07:17            RADIOLOGY & ADDITIONAL TESTS:

## 2020-12-17 NOTE — TELEPHONE ENCOUNTER
Patient calling reports asking for a COVID  Test  , wife is currently being tested for COVID at [place] [id number] in [place] and wife has symptoms     Currently patient has no symptoms , asking to be COVID  Tested,     Patient is asymptomatic.   [facility] order p household cleaning sprays or wipes according to the label instructions.

## 2020-12-21 ENCOUNTER — LAB ENCOUNTER (OUTPATIENT)
Dept: LAB | Age: 65
End: 2020-12-21
Attending: INTERNAL MEDICINE
Payer: COMMERCIAL

## 2021-01-08 ENCOUNTER — APPOINTMENT (OUTPATIENT)
Dept: CARDIOLOGY | Age: 66
End: 2021-01-08

## 2021-03-20 DIAGNOSIS — Z23 NEED FOR VACCINATION: ICD-10-CM

## 2021-04-12 ENCOUNTER — OFFICE VISIT (OUTPATIENT)
Dept: INTERNAL MEDICINE CLINIC | Facility: CLINIC | Age: 66
End: 2021-04-12
Payer: COMMERCIAL

## 2021-04-12 ENCOUNTER — LAB ENCOUNTER (OUTPATIENT)
Dept: LAB | Facility: HOSPITAL | Age: 66
End: 2021-04-12
Attending: INTERNAL MEDICINE
Payer: COMMERCIAL

## 2021-04-12 VITALS
RESPIRATION RATE: 20 BRPM | DIASTOLIC BLOOD PRESSURE: 90 MMHG | WEIGHT: 309 LBS | SYSTOLIC BLOOD PRESSURE: 142 MMHG | HEIGHT: 71 IN | HEART RATE: 64 BPM | BODY MASS INDEX: 43.26 KG/M2

## 2021-04-12 DIAGNOSIS — R10.2 PELVIC PAIN: ICD-10-CM

## 2021-04-12 DIAGNOSIS — R10.2 PELVIC PAIN: Primary | ICD-10-CM

## 2021-04-12 PROCEDURE — 80053 COMPREHEN METABOLIC PANEL: CPT

## 2021-04-12 PROCEDURE — 81003 URINALYSIS AUTO W/O SCOPE: CPT | Performed by: INTERNAL MEDICINE

## 2021-04-12 PROCEDURE — 3080F DIAST BP >= 90 MM HG: CPT | Performed by: INTERNAL MEDICINE

## 2021-04-12 PROCEDURE — 85025 COMPLETE CBC W/AUTO DIFF WBC: CPT

## 2021-04-12 PROCEDURE — 3008F BODY MASS INDEX DOCD: CPT | Performed by: INTERNAL MEDICINE

## 2021-04-12 PROCEDURE — 99214 OFFICE O/P EST MOD 30 MIN: CPT | Performed by: INTERNAL MEDICINE

## 2021-04-12 PROCEDURE — 3077F SYST BP >= 140 MM HG: CPT | Performed by: INTERNAL MEDICINE

## 2021-04-12 PROCEDURE — 84153 ASSAY OF PSA TOTAL: CPT

## 2021-04-12 PROCEDURE — 36415 COLL VENOUS BLD VENIPUNCTURE: CPT

## 2021-04-12 RX ORDER — HYDROCHLOROTHIAZIDE 12.5 MG/1
12.5 CAPSULE, GELATIN COATED ORAL DAILY
COMMUNITY

## 2021-04-12 RX ORDER — NAPROXEN 500 MG/1
500 TABLET ORAL 2 TIMES DAILY PRN
COMMUNITY
End: 2021-09-07

## 2021-04-12 NOTE — PROGRESS NOTES
HPI:    Patient ID: Kennon Favre is a 63year old male. HPI  Patient is here today complaining of right groin pain. We last saw him in the office in July of last year for general physical exam.  Blood pressure was a little bit elevated at that time.   C Age of Onset   • Cancer Mother         kidney cancer   • Hypertension Mother    • Hypertension Father    • Prostate Cancer Father 68   • Cancer Father    • Diabetes Paternal Grandfather    • Arthritis Daughter         rheumatoid   • Heart Disease Paternal Multiple Vitamin (MULTIVITAMINS) Oral Cap Take 1 capsule by mouth. Indications: 3 times week, mon/wed/fri     • aspirin 325 MG Oral Tab Take 325 mg by mouth daily. • Fish Oil-Cholecalciferol (FISH OIL + D3 OR) Take 1 capsule by mouth daily.      • COD L with abdomen and pelvic CT scan to look for possible cause. Possibly neurologic cause but that would mean something in the sacral area and not the lumbar area. Await initial evaluation test.  - CBC WITH DIFFERENTIAL WITH PLATELET;  Future  - PSA, DIAGNOST

## 2021-04-26 ENCOUNTER — HOSPITAL ENCOUNTER (OUTPATIENT)
Dept: CT IMAGING | Age: 66
Discharge: HOME | End: 2021-04-26
Attending: INTERNAL MEDICINE
Payer: COMMERCIAL

## 2021-04-26 DIAGNOSIS — R10.30 LOWER ABDOMINAL PAIN: ICD-10-CM

## 2021-04-26 PROCEDURE — 74177 CT ABD & PELVIS W/CONTRAST: CPT | Performed by: INTERNAL MEDICINE

## 2021-06-02 ENCOUNTER — OFFICE VISIT (OUTPATIENT)
Dept: INTERNAL MEDICINE CLINIC | Facility: CLINIC | Age: 66
End: 2021-06-02
Payer: COMMERCIAL

## 2021-06-02 VITALS
HEIGHT: 71 IN | BODY MASS INDEX: 43.82 KG/M2 | DIASTOLIC BLOOD PRESSURE: 78 MMHG | RESPIRATION RATE: 16 BRPM | HEART RATE: 86 BPM | OXYGEN SATURATION: 96 % | SYSTOLIC BLOOD PRESSURE: 128 MMHG | WEIGHT: 313 LBS

## 2021-06-02 DIAGNOSIS — J45.909 UNCOMPLICATED ASTHMA: ICD-10-CM

## 2021-06-02 DIAGNOSIS — R42 DIZZINESS: Primary | ICD-10-CM

## 2021-06-02 DIAGNOSIS — J45.20 MILD INTERMITTENT ASTHMA WITHOUT COMPLICATION: ICD-10-CM

## 2021-06-02 PROCEDURE — 3008F BODY MASS INDEX DOCD: CPT | Performed by: NURSE PRACTITIONER

## 2021-06-02 PROCEDURE — 3078F DIAST BP <80 MM HG: CPT | Performed by: NURSE PRACTITIONER

## 2021-06-02 PROCEDURE — 3074F SYST BP LT 130 MM HG: CPT | Performed by: NURSE PRACTITIONER

## 2021-06-02 PROCEDURE — 99214 OFFICE O/P EST MOD 30 MIN: CPT | Performed by: NURSE PRACTITIONER

## 2021-06-02 RX ORDER — DEXAMETHASONE 4 MG/1
2 TABLET ORAL 2 TIMES DAILY
Qty: 1 EACH | Refills: 0 | Status: SHIPPED | OUTPATIENT
Start: 2021-06-02

## 2021-06-02 RX ORDER — ALBUTEROL SULFATE 90 UG/1
2 AEROSOL, METERED RESPIRATORY (INHALATION) EVERY 4 HOURS PRN
Qty: 1 EACH | Refills: 2 | Status: SHIPPED | OUTPATIENT
Start: 2021-06-02

## 2021-06-02 NOTE — PROGRESS NOTES
HPI:    Patient ID: Rekha Mcdonough is a 63year old male. HPI Dizziness  Patient is now feeling better. Patient forgot to drink any water and had dizziness  Patient had a drop in blood pressure from lying to standing from 037 systolic to 636 systolic.  Gadiel Hortencia Marital status:       Spouse name: Not on file      Number of children: Not on file      Years of education: Not on file      Highest education level: Not on file    Tobacco Use      Smoking status: Never Smoker      Smokeless tobacco: Never Used 12.5 MG Oral Cap Take 12.5 mg by mouth daily. • naproxen 500 MG Oral Tab Take 500 mg by mouth 2 (two) times daily as needed.      • AMLODIPINE BESYLATE 5 MG Oral Tab TAKE 1 TABLET BY MOUTH ONCE DAILY 90 tablet 3   • METOPROLOL SUCCINATE  MG Oral T General:         Right eye: No discharge. Left eye: No discharge. Pupils: Pupils are equal, round, and reactive to light. Cardiovascular:      Rate and Rhythm: Normal rate and regular rhythm. Heart sounds: Normal heart sounds.  No murm breathing.     Plan    Continue Flovent and Proventil         Relevant Medications    Fluticasone Propionate HFA (FLOVENT HFA) 110 MCG/ACT Inhalation Aerosol    Albuterol Sulfate HFA (PROVENTIL HFA) 108 (90 Base) MCG/ACT Inhalation Aero Soln    Dizziness -

## 2021-06-02 NOTE — ASSESSMENT & PLAN NOTE
A/P 61-year-old male who had an episode of dizziness the other day especially when he got up that quickly in the morning. He realized that he did not drink any water the day before.   Patient states he thinks he was dehydrated because he has been drinking

## 2021-06-02 NOTE — ASSESSMENT & PLAN NOTE
A/P 82-year-old -American male with a history of asthma is here for refills on his inhalers. Patient denies any shortness of breath or wheezing or difficulty breathing.     Plan    Continue Flovent and Proventil

## 2021-06-09 ENCOUNTER — TELEPHONE (OUTPATIENT)
Dept: CARDIOLOGY | Age: 66
End: 2021-06-09

## 2021-06-09 DIAGNOSIS — R06.09 DYSPNEA ON EXERTION: ICD-10-CM

## 2021-06-09 DIAGNOSIS — R60.9 EDEMA, UNSPECIFIED TYPE: Primary | ICD-10-CM

## 2021-06-09 DIAGNOSIS — I10 ESSENTIAL HYPERTENSION: ICD-10-CM

## 2021-06-11 ENCOUNTER — LAB ENCOUNTER (OUTPATIENT)
Dept: LAB | Age: 66
End: 2021-06-11
Attending: INTERNAL MEDICINE
Payer: COMMERCIAL

## 2021-06-11 DIAGNOSIS — R06.00 DYSPNEA ON EXERTION: ICD-10-CM

## 2021-06-11 DIAGNOSIS — I10 ESSENTIAL HYPERTENSION, MALIGNANT: ICD-10-CM

## 2021-06-11 DIAGNOSIS — R60.9 EDEMA: Primary | ICD-10-CM

## 2021-06-12 ENCOUNTER — LAB ENCOUNTER (OUTPATIENT)
Dept: LAB | Age: 66
End: 2021-06-12
Attending: INTERNAL MEDICINE
Payer: COMMERCIAL

## 2021-06-12 DIAGNOSIS — I10 ESSENTIAL HYPERTENSION, MALIGNANT: ICD-10-CM

## 2021-06-12 DIAGNOSIS — R06.00 DYSPNEA ON EXERTION: ICD-10-CM

## 2021-06-12 DIAGNOSIS — R60.9 EDEMA: ICD-10-CM

## 2021-06-12 PROCEDURE — 80053 COMPREHEN METABOLIC PANEL: CPT

## 2021-06-12 PROCEDURE — 83880 ASSAY OF NATRIURETIC PEPTIDE: CPT

## 2021-06-12 PROCEDURE — 36415 COLL VENOUS BLD VENIPUNCTURE: CPT

## 2021-06-12 PROCEDURE — 85025 COMPLETE CBC W/AUTO DIFF WBC: CPT

## 2021-06-13 RX ORDER — METOPROLOL SUCCINATE 100 MG/1
100 TABLET, EXTENDED RELEASE ORAL DAILY
Qty: 90 TABLET | Refills: 1 | Status: SHIPPED | OUTPATIENT
Start: 2021-06-13 | End: 2021-10-30

## 2021-06-13 RX ORDER — AMLODIPINE BESYLATE 5 MG/1
5 TABLET ORAL DAILY
Qty: 90 TABLET | Refills: 1 | Status: SHIPPED | OUTPATIENT
Start: 2021-06-13

## 2021-06-29 RX ORDER — SILDENAFIL 50 MG/1
TABLET, FILM COATED ORAL
Qty: 6 TABLET | Refills: 0 | Status: SHIPPED | OUTPATIENT
Start: 2021-06-29

## 2021-07-21 ENCOUNTER — TELEPHONE (OUTPATIENT)
Dept: INTERNAL MEDICINE CLINIC | Facility: CLINIC | Age: 66
End: 2021-07-21

## 2021-07-21 NOTE — TELEPHONE ENCOUNTER
Dr. Dom Moreau, patient was advised by Dr. Susi Lo to increase his Amlodipine 5 mg twice a day. Patient is running low on his medication, requesting medication be changed to Amlodipine 10 mg once daily. Script has been pended.      Telephone Encounter - Fr

## 2021-07-23 RX ORDER — AMLODIPINE BESYLATE 10 MG/1
10 TABLET ORAL DAILY
Qty: 90 TABLET | Refills: 1 | Status: SHIPPED | OUTPATIENT
Start: 2021-07-23 | End: 2022-01-19

## 2021-08-23 ENCOUNTER — NURSE TRIAGE (OUTPATIENT)
Dept: INTERNAL MEDICINE CLINIC | Facility: CLINIC | Age: 66
End: 2021-08-23

## 2021-08-23 NOTE — TELEPHONE ENCOUNTER
Please reply to pool: EM RN TRIAGE      Action Requested: Summary for Provider     []  Critical Lab, Recommendations Needed  [] Need Additional Advice  []   FYI    []   Need Orders  [] Need Medications Sent to Pharmacy  []  Other     SUMMARY: Advised ED

## 2021-08-23 NOTE — TELEPHONE ENCOUNTER
Please review. Drug warning. Refill passed per [facility] Monisha protocol.   Requested Prescriptions   Pending Prescriptions Disp Refills    OLMESARTAN MEDOXOMIL-HCTZ 40-25 MG Oral Tab [Pharmacy Med Name: Olmesartan Medoxomil-HCTZ 40-25 MG Oral Tablet]

## 2021-08-24 RX ORDER — OLMESARTAN MEDOXOMIL AND HYDROCHLOROTHIAZIDE 40/25 40; 25 MG/1; MG/1
1 TABLET ORAL DAILY
Qty: 90 TABLET | Refills: 1 | Status: SHIPPED | OUTPATIENT
Start: 2021-08-24

## 2021-08-25 ENCOUNTER — TELEPHONE (OUTPATIENT)
Dept: INTERNAL MEDICINE CLINIC | Facility: CLINIC | Age: 66
End: 2021-08-25

## 2021-08-25 ENCOUNTER — APPOINTMENT (OUTPATIENT)
Dept: GENERAL RADIOLOGY | Facility: HOSPITAL | Age: 66
End: 2021-08-25
Attending: EMERGENCY MEDICINE
Payer: COMMERCIAL

## 2021-08-25 ENCOUNTER — HOSPITAL ENCOUNTER (EMERGENCY)
Facility: HOSPITAL | Age: 66
Discharge: HOME | End: 2021-08-26
Attending: EMERGENCY MEDICINE
Payer: COMMERCIAL

## 2021-08-25 DIAGNOSIS — R06.02 SHORTNESS OF BREATH: Primary | ICD-10-CM

## 2021-08-25 DIAGNOSIS — M79.89 LEG SWELLING: ICD-10-CM

## 2021-08-25 LAB
ANION GAP SERPL CALC-SCNC: 2 MMOL/L (ref 0–18)
BASOPHILS # BLD AUTO: 0.06 X10(3) UL (ref 0–0.2)
BASOPHILS NFR BLD AUTO: 0.7 %
BUN BLD-MCNC: 14 MG/DL (ref 7–18)
BUN/CREAT SERPL: 15.2 (ref 10–20)
CALCIUM BLD-MCNC: 9.3 MG/DL (ref 8.5–10.1)
CHLORIDE SERPL-SCNC: 104 MMOL/L (ref 98–112)
CO2 SERPL-SCNC: 32 MMOL/L (ref 21–32)
CREAT BLD-MCNC: 0.92 MG/DL
D DIMER PPP FEU-MCNC: 0.46 UG/ML FEU (ref ?–0.64)
DEPRECATED RDW RBC AUTO: 45.1 FL (ref 35.1–46.3)
EOSINOPHIL # BLD AUTO: 0.29 X10(3) UL (ref 0–0.7)
EOSINOPHIL NFR BLD AUTO: 3.4 %
ERYTHROCYTE [DISTWIDTH] IN BLOOD BY AUTOMATED COUNT: 17.2 % (ref 11–15)
GLUCOSE BLD-MCNC: 89 MG/DL (ref 70–99)
HCT VFR BLD AUTO: 44.4 %
HGB BLD-MCNC: 14.1 G/DL
IMM GRANULOCYTES # BLD AUTO: 0.04 X10(3) UL (ref 0–1)
IMM GRANULOCYTES NFR BLD: 0.5 %
LYMPHOCYTES # BLD AUTO: 2.8 X10(3) UL (ref 1–4)
LYMPHOCYTES NFR BLD AUTO: 32.6 %
MCH RBC QN AUTO: 23.9 PG (ref 26–34)
MCHC RBC AUTO-ENTMCNC: 31.8 G/DL (ref 31–37)
MCV RBC AUTO: 75.1 FL
MONOCYTES # BLD AUTO: 0.65 X10(3) UL (ref 0.1–1)
MONOCYTES NFR BLD AUTO: 7.6 %
NEUTROPHILS # BLD AUTO: 4.75 X10 (3) UL (ref 1.5–7.7)
NEUTROPHILS # BLD AUTO: 4.75 X10(3) UL (ref 1.5–7.7)
NEUTROPHILS NFR BLD AUTO: 55.2 %
NT-PROBNP SERPL-MCNC: 33 PG/ML (ref ?–125)
OSMOLALITY SERPL CALC.SUM OF ELEC: 286 MOSM/KG (ref 275–295)
PLATELET # BLD AUTO: 356 10(3)UL (ref 150–450)
POTASSIUM SERPL-SCNC: 3.5 MMOL/L (ref 3.5–5.1)
RBC # BLD AUTO: 5.91 X10(6)UL
SARS-COV-2 RNA RESP QL NAA+PROBE: NOT DETECTED
SODIUM SERPL-SCNC: 138 MMOL/L (ref 136–145)
TROPONIN I SERPL-MCNC: <0.045 NG/ML (ref ?–0.04)
WBC # BLD AUTO: 8.6 X10(3) UL (ref 4–11)

## 2021-08-25 PROCEDURE — 83880 ASSAY OF NATRIURETIC PEPTIDE: CPT | Performed by: EMERGENCY MEDICINE

## 2021-08-25 PROCEDURE — 93010 ELECTROCARDIOGRAM REPORT: CPT | Performed by: EMERGENCY MEDICINE

## 2021-08-25 PROCEDURE — 85025 COMPLETE CBC W/AUTO DIFF WBC: CPT | Performed by: EMERGENCY MEDICINE

## 2021-08-25 PROCEDURE — 99285 EMERGENCY DEPT VISIT HI MDM: CPT

## 2021-08-25 PROCEDURE — 85379 FIBRIN DEGRADATION QUANT: CPT | Performed by: EMERGENCY MEDICINE

## 2021-08-25 PROCEDURE — 80048 BASIC METABOLIC PNL TOTAL CA: CPT | Performed by: EMERGENCY MEDICINE

## 2021-08-25 PROCEDURE — 84484 ASSAY OF TROPONIN QUANT: CPT | Performed by: EMERGENCY MEDICINE

## 2021-08-25 PROCEDURE — 71045 X-RAY EXAM CHEST 1 VIEW: CPT | Performed by: EMERGENCY MEDICINE

## 2021-08-25 PROCEDURE — 93005 ELECTROCARDIOGRAM TRACING: CPT

## 2021-08-25 PROCEDURE — 36415 COLL VENOUS BLD VENIPUNCTURE: CPT

## 2021-08-25 NOTE — TELEPHONE ENCOUNTER
No ER encounter. Patient says he is better after getting some rest, but still has pain in his lungs. Patient states he will go to ER today. Routed as FYI.

## 2021-08-26 VITALS
HEART RATE: 80 BPM | SYSTOLIC BLOOD PRESSURE: 120 MMHG | OXYGEN SATURATION: 98 % | TEMPERATURE: 98 F | HEIGHT: 71 IN | DIASTOLIC BLOOD PRESSURE: 78 MMHG | BODY MASS INDEX: 42 KG/M2 | WEIGHT: 300 LBS | RESPIRATION RATE: 16 BRPM

## 2021-08-26 RX ORDER — FUROSEMIDE 20 MG/1
20 TABLET ORAL DAILY
Qty: 4 TABLET | Refills: 0 | Status: SHIPPED | OUTPATIENT
Start: 2021-08-26 | End: 2021-08-30

## 2021-08-26 NOTE — ED INITIAL ASSESSMENT (HPI)
Patient presents to ED with c/o of shortness of breath since Monday. Hx of Asthma. Vaccinated for [employer]. Denies fevers, cough.

## 2021-08-26 NOTE — ED PROVIDER NOTES
Patient Seen in: [facility] AND [facility] Emergency Department      History   Patient presents with:  Difficulty Breathing    Stated Complaint: sob    HPI/Subjective:   HPI    59-year-old male with history of atrial fibrillation, not on anticoagulation, here wi systems reviewed and are negative. Positive for stated complaint: sob  Other systems are as noted in HPI. Constitutional and vital signs reviewed. All other systems reviewed and negative except as noted above.     Physical Exam     ED Triage Mallory hour(s))   Basic Metabolic Panel (8)    Collection Time: 08/25/21 10:35 PM   Result Value Ref Range    Glucose 89 70 - 99 mg/dL    Sodium 138 136 - 145 mmol/L    Potassium 3.5 3.5 - 5.1 mmol/L    Chloride 104 98 - 112 mmol/L    CO2 32.0 21.0 - 32.0 mmol/L Granulocyte % 0.5 %       Imaging Results Available and Reviewed by me while in ED:  No results found. X-RAY CHEST ONE VIEW 2307 HOURS      IMPRESSION:  -No evidence of acute cardiopulmonary disease.         The results were faxed/finalized only at 0050 hr including CHF, PE, pneumonia, COVID 19, shortness of breath.                          Disposition and Plan     Clinical Impression:  Shortness of breath  (primary encounter diagnosis)  Leg swelling     Disposition:  Discharge  8/26/2021 12:27 am    Follow-u

## 2021-08-26 NOTE — TELEPHONE ENCOUNTER
ED  Discharged   8/25/2021 - 8/26/2021 (2 hours)  [facility] Emergency Department     Carol Gaffney MD  Last attending • Treatment team  Shortness of breath +1 more  Clinical impression  Difficulty Breathing  Chief complaint

## 2021-08-30 NOTE — TELEPHONE ENCOUNTER
Pt sent dr. Winifred Medina messge inquiring about forms. Sent [facility] regarding turn around time.
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
## 2021-09-04 ENCOUNTER — APPOINTMENT (OUTPATIENT)
Dept: ULTRASOUND IMAGING | Facility: HOSPITAL | Age: 66
End: 2021-09-04
Attending: EMERGENCY MEDICINE
Payer: COMMERCIAL

## 2021-09-04 ENCOUNTER — APPOINTMENT (OUTPATIENT)
Dept: GENERAL RADIOLOGY | Facility: HOSPITAL | Age: 66
End: 2021-09-04
Attending: EMERGENCY MEDICINE
Payer: COMMERCIAL

## 2021-09-04 ENCOUNTER — HOSPITAL ENCOUNTER (EMERGENCY)
Facility: HOSPITAL | Age: 66
Discharge: HOME | End: 2021-09-04
Attending: EMERGENCY MEDICINE
Payer: COMMERCIAL

## 2021-09-04 VITALS
OXYGEN SATURATION: 96 % | RESPIRATION RATE: 18 BRPM | DIASTOLIC BLOOD PRESSURE: 65 MMHG | SYSTOLIC BLOOD PRESSURE: 131 MMHG | HEART RATE: 65 BPM

## 2021-09-04 DIAGNOSIS — L03.90 CELLULITIS, UNSPECIFIED CELLULITIS SITE: Primary | ICD-10-CM

## 2021-09-04 DIAGNOSIS — M25.571 ACUTE RIGHT ANKLE PAIN: ICD-10-CM

## 2021-09-04 PROCEDURE — 73610 X-RAY EXAM OF ANKLE: CPT | Performed by: EMERGENCY MEDICINE

## 2021-09-04 PROCEDURE — 99284 EMERGENCY DEPT VISIT MOD MDM: CPT

## 2021-09-04 PROCEDURE — 99285 EMERGENCY DEPT VISIT HI MDM: CPT

## 2021-09-04 PROCEDURE — 93971 EXTREMITY STUDY: CPT | Performed by: EMERGENCY MEDICINE

## 2021-09-04 RX ORDER — DOXYCYCLINE HYCLATE 100 MG/1
100 CAPSULE ORAL ONCE
Status: COMPLETED | OUTPATIENT
Start: 2021-09-04 | End: 2021-09-04

## 2021-09-04 RX ORDER — IBUPROFEN 600 MG/1
600 TABLET ORAL ONCE
Status: COMPLETED | OUTPATIENT
Start: 2021-09-04 | End: 2021-09-04

## 2021-09-04 RX ORDER — DOXYCYCLINE HYCLATE 100 MG/1
100 CAPSULE ORAL 2 TIMES DAILY
Qty: 20 CAPSULE | Refills: 0 | Status: SHIPPED | OUTPATIENT
Start: 2021-09-04 | End: 2021-09-14

## 2021-09-04 NOTE — ED INITIAL ASSESSMENT (HPI)
Pt to the emergency room for right sided leg pain. Pt reports worsening pain with ambulation and palpation to the right ankle. Pt denies trauma to the site. swelling noted throughout the right leg.

## 2021-09-04 NOTE — ED PROVIDER NOTES
Patient Seen in: [facility] Emergency Department      History   Patient presents with:  Pain    Stated Complaint: R ankle pain since Thursday, swelling, no injury     HPI/Subjective:   HPI  81-year-old man, history of atrial fibrillation, here for e Exam    Vitals signs and nursing note reviewed. General: Well-appearing gentleman lying supine in the bed no acute distress  Head: Normocephalic and atraumatic. Pulmonary:  Breathing comfortably, no respiratory distress.   Musculoskeletal: Right lower e extremity venous system. B-mode two-dimensional images of the vascular structures, Doppler spectral analysis, and color flow. Doppler imaging were performed.   The following veins were imaged:  Common, deep, and superficial femoral, popliteal, sapheno-femo Mesfin Angelo MD on 8/26/2021 at 11:03 AM     Finalized by (CST): Mesfin Angelo MD on 8/26/2021 at 11:05 AM               MDM      72-year-old man, here with erythema and warmth over the right lateral malleolus, denies any history of trauma.  Does h

## 2021-09-07 ENCOUNTER — OFFICE VISIT (OUTPATIENT)
Dept: INTERNAL MEDICINE CLINIC | Facility: CLINIC | Age: 66
End: 2021-09-07
Payer: COMMERCIAL

## 2021-09-07 VITALS
OXYGEN SATURATION: 99 % | HEART RATE: 89 BPM | SYSTOLIC BLOOD PRESSURE: 123 MMHG | WEIGHT: 315 LBS | BODY MASS INDEX: 44.1 KG/M2 | DIASTOLIC BLOOD PRESSURE: 80 MMHG | HEIGHT: 71 IN

## 2021-09-07 DIAGNOSIS — I10 ESSENTIAL HYPERTENSION: Primary | ICD-10-CM

## 2021-09-07 DIAGNOSIS — E66.01 CLASS 3 SEVERE OBESITY DUE TO EXCESS CALORIES WITH BODY MASS INDEX (BMI) OF 40.0 TO 44.9 IN ADULT, UNSPECIFIED WHETHER SERIOUS COMORBIDITY PRESENT (HCC): ICD-10-CM

## 2021-09-07 DIAGNOSIS — L03.115 CELLULITIS OF RIGHT LOWER EXTREMITY: ICD-10-CM

## 2021-09-07 PROCEDURE — 3074F SYST BP LT 130 MM HG: CPT | Performed by: NURSE PRACTITIONER

## 2021-09-07 PROCEDURE — 99214 OFFICE O/P EST MOD 30 MIN: CPT | Performed by: NURSE PRACTITIONER

## 2021-09-07 PROCEDURE — 3079F DIAST BP 80-89 MM HG: CPT | Performed by: NURSE PRACTITIONER

## 2021-09-07 PROCEDURE — 3008F BODY MASS INDEX DOCD: CPT | Performed by: NURSE PRACTITIONER

## 2021-09-07 RX ORDER — NAPROXEN 500 MG/1
500 TABLET ORAL 2 TIMES DAILY PRN
Qty: 15 TABLET | Refills: 0 | Status: SHIPPED | OUTPATIENT
Start: 2021-09-07

## 2021-09-07 NOTE — TELEPHONE ENCOUNTER
Pt states forms are a recert. Called to check status of forms. Aware of 10-15 business day turn around time and expressed understanding.

## 2021-09-07 NOTE — PROGRESS NOTES
HPI:    Patient ID: Sujit Khan is a 61year old male. Pt of Dr. Karen Villarreal seeing for the first time. Hypertension  Patient is here for follow up of hypertension. BP at home: 120/80's. Has been compliant with medications.   Exercise level: walking 7,00 07/28/2020 11:02 AM    TRIG 100 07/28/2020 11:02 AM     (H) 07/28/2020 11:02 AM    NONHDLC 140 (H) 07/28/2020 11:02 AM          Pt presents to the clinic w/ c/o R leg pain x 3 days. Seen at [address] [place] [address] ER on 9/4/2021. Dx w/ Cellulitis.  Started on Doxycyclin as needed. 15 tablet 0   • doxycycline 100 MG Oral Cap Take 1 capsule (100 mg total) by mouth 2 (two) times daily for 10 days. 20 capsule 0   • Olmesartan Medoxomil-HCTZ 40-25 MG Oral Tab Take 1 tablet by mouth daily.  90 tablet 1   • amLODIPine Besylate 10 COMMENTS)  Penicillins             DIZZINESS    Comment:Other reaction(s): Dizziness    HISTORY:  Past Medical History:   Diagnosis Date   • Arrhythmia    • Asthma    • Atrial fibrillation [facility])     2005 - medical management; [facility] Right Ear: Hearing and external ear normal.      Left Ear: Hearing and external ear normal.      Nose:      Comments: Pt wearing mask d/t covid19 situation. Eyes:      General: Lids are normal.      Extraocular Movements: Extraocular movements intact. (primary encounter diagnosis)  Careful with diet and excercise at least 30 minutes 3-4 times a week. Check blood pressures at different times on different days. Can purchase own blood pressure monitor. If not, check at local pharmacy.  Bake foods more and g

## 2021-09-07 NOTE — PATIENT INSTRUCTIONS
ASSESSMENT/PLAN:   Essential hypertension  (primary encounter diagnosis)  Careful with diet and excercise at least 30 minutes 3-4 times a week. Check blood pressures at different times on different days. Can purchase own blood pressure monitor.  If not, c raise your levels of cholesterol, so keep these fats to a minimum. They are found in foods such as fatty meats, whole milk, cheese, and palm and coconut oils. Avoid trans fats because they lower good cholesterol as well as raise bad cholesterol.  Trans fats include fish, skinless chicken and turkey, and beans. Draining the fat from cooked ground meat is another way to reduce the amount of fat you eat. · Low-fat and nonfat dairy provide nutrients without a lot of fat.  Try low-fat or nonfat milk, cheese, or yo intended as a substitute for professional medical care. Always follow your healthcare professional's instructions. Discharge Instructions for Cellulitis   You have been diagnosed with cellulitis.  This is an infection in the deepest layer of the skin area, particularly if the area of redness expands to a wider area  · Shaking chills  · Swelling of the infected area  · Vomiting  Kristie last reviewed this educational content on 11/1/2019  © 8889-4244 [employer]. All rights reserved.  This

## 2021-09-09 NOTE — TELEPHONE ENCOUNTER
Dr. Merrick Wolf,     Please sign off on form: Vasquez recert  -Highlight the patient and hit \"Chart\" button. -In Chart Review, w/in the Encounter tab - click 1 time on the Telephone call encounter for 8/25/21 Scroll down the telephone encounter.  -Click \"scan on\" blue Hyperlink under \"Media\" heading for Vasquez Wolf 9/8/21 w/in the telephone enc.  -Click on Acknowledge button at the bottom right corner and left-click onto image, signature stamp appears and drag signature to Provider signature line. Stamp will turn blue. Close window. Thank you,    Tia Richardson.

## 2021-09-23 ENCOUNTER — TELEPHONE (OUTPATIENT)
Dept: INTERNAL MEDICINE CLINIC | Facility: CLINIC | Age: 66
End: 2021-09-23

## 2021-09-23 NOTE — TELEPHONE ENCOUNTER
Caroline Medina,    Would you be okay to sign Dr. Karthikeyan Flaherty pts fmla recert? I have routed to Dr. Karthikeyan Flaherty since beginning of Sept. With no luck obtaining a signature. He seeks 1 flare up per month lasting 1-3 days for Chronic asthma.  If you support, please sign attach

## 2021-09-28 NOTE — TELEPHONE ENCOUNTER
Forms completed and faxed to [facility] specialist at [phone]. [employer].

## 2021-10-30 RX ORDER — METOPROLOL SUCCINATE 100 MG/1
100 TABLET, EXTENDED RELEASE ORAL DAILY
Qty: 90 TABLET | Refills: 1 | Status: SHIPPED | OUTPATIENT
Start: 2021-10-30 | End: 2022-04-02

## 2021-10-30 NOTE — TELEPHONE ENCOUNTER
Refill passed per [facility] [facility] protocol.   Requested Prescriptions   Pending Prescriptions Disp Refills    METOPROLOL SUCCINATE 100 MG Oral Tablet 24 Hr [Pharmacy Med Name: Metoprolol Succinate  MG Oral Tablet Extended Release 24 Hour] 90 tablet 3     Sig: TAKE 1 TABLET BY MOUTH  DAILY        Hypertensive Medications Protocol Passed - 10/29/2021  8:57 PM        Passed - CMP or BMP in past 12 months        Passed - Appointment in past 6 or next 3 months        Passed - GFR  > 50     Lab Results   Component Value Date    GFRAA 101 08/25/2021                        Recent Outpatient Visits              1 month ago Essential hypertension    [facility], [address], Nemesio Lorenzo APRN    Office Visit    5 months ago [address] [place] [address] Dr [place], [address], Son Rodríguez APRN    Office Visit    6 months ago Pelvic pain    [facility], [address], Renata Medina MD    Office Visit    1 year ago Routine physical examination    [facility], [address], Renata Medina MD    Office Visit    1 year ago Acute recurrent maxillary sinusitis    [facility], Son Rodríguez APRN    Office Visit

## 2021-11-15 ENCOUNTER — APPOINTMENT (OUTPATIENT)
Dept: GENERAL RADIOLOGY | Age: 66
End: 2021-11-15
Attending: EMERGENCY MEDICINE
Payer: COMMERCIAL

## 2021-11-15 ENCOUNTER — HOSPITAL ENCOUNTER (OUTPATIENT)
Age: 66
Discharge: HOME | End: 2021-11-15
Attending: EMERGENCY MEDICINE
Payer: COMMERCIAL

## 2021-11-15 VITALS
RESPIRATION RATE: 18 BRPM | BODY MASS INDEX: 42 KG/M2 | HEART RATE: 74 BPM | SYSTOLIC BLOOD PRESSURE: 160 MMHG | TEMPERATURE: 98 F | HEIGHT: 71 IN | WEIGHT: 300 LBS | DIASTOLIC BLOOD PRESSURE: 80 MMHG | OXYGEN SATURATION: 97 %

## 2021-11-15 DIAGNOSIS — J06.9 VIRAL UPPER RESPIRATORY TRACT INFECTION: Primary | ICD-10-CM

## 2021-11-15 PROCEDURE — 71046 X-RAY EXAM CHEST 2 VIEWS: CPT | Performed by: EMERGENCY MEDICINE

## 2021-11-15 PROCEDURE — 99213 OFFICE O/P EST LOW 20 MIN: CPT

## 2021-11-15 RX ORDER — FLUTICASONE PROPIONATE 50 MCG
2 SPRAY, SUSPENSION (ML) NASAL DAILY
Qty: 16 G | Refills: 0 | Status: SHIPPED | OUTPATIENT
Start: 2021-11-15 | End: 2021-12-15

## 2021-11-18 NOTE — ED PROVIDER NOTES
Patient Seen in: [facility]      History   Patient presents with:  Covid-19 Test    Stated Complaint: REPIRATORY PROBLEM    Subjective:   HPI    17-year-old male presents emergency department with a nonproductive cough has had a fatigue a measures to prevent infection transmission during my interaction with the patient were taken. The patient was already wearing a droplet mask on my arrival to the room.  Personal protective equipment including droplet mask, eye protection, and gloves were wo pneumothorax. CONCLUSION:  No evidence of active cardiopulmonary disease.      Dictated by (CST): Betty Rojas MD on 11/15/2021 at 7:24 PM     Finalized by (CST): Betty Rojas MD on 11/15/2021 at 7:25 PM        I personally reviewed the images mys

## 2021-12-01 ENCOUNTER — IMMUNIZATION (OUTPATIENT)
Dept: INTERNAL MEDICINE CLINIC | Facility: CLINIC | Age: 66
End: 2021-12-01
Payer: COMMERCIAL

## 2021-12-01 DIAGNOSIS — Z23 NEED FOR VACCINATION: Primary | ICD-10-CM

## 2021-12-01 PROCEDURE — 90686 IIV4 VACC NO PRSV 0.5 ML IM: CPT | Performed by: INTERNAL MEDICINE

## 2021-12-01 PROCEDURE — 90471 IMMUNIZATION ADMIN: CPT | Performed by: INTERNAL MEDICINE

## 2021-12-08 ENCOUNTER — IMMUNIZATION (OUTPATIENT)
Dept: LAB | Facility: HOSPITAL | Age: 66
End: 2021-12-08
Attending: EMERGENCY MEDICINE
Payer: COMMERCIAL

## 2021-12-08 DIAGNOSIS — Z23 NEED FOR VACCINATION: Primary | ICD-10-CM

## 2021-12-08 PROCEDURE — 0004A SARSCOV2 VAC 30MCG/0.3ML IM: CPT

## 2021-12-13 ENCOUNTER — LAB ENCOUNTER (OUTPATIENT)
Dept: LAB | Age: 66
End: 2021-12-13
Attending: INTERNAL MEDICINE
Payer: COMMERCIAL

## 2021-12-13 ENCOUNTER — NURSE TRIAGE (OUTPATIENT)
Dept: INTERNAL MEDICINE CLINIC | Facility: CLINIC | Age: 66
End: 2021-12-13

## 2021-12-13 DIAGNOSIS — Z20.822 EXPOSURE TO COVID-19 VIRUS: Primary | ICD-10-CM

## 2021-12-13 DIAGNOSIS — Z20.822 EXPOSURE TO COVID-19 VIRUS: ICD-10-CM

## 2021-12-13 NOTE — TELEPHONE ENCOUNTER
Action Requested: Summary for Provider     []  Critical Lab, Recommendations Needed  [] Need Additional Advice  [x]   FYI    []   Need Orders  [] Need Medications Sent to Pharmacy  []  Other     SUMMARY: Patient's wife calling with complaint  of patient be

## 2022-01-08 NOTE — TELEPHONE ENCOUNTER
[facility] called and requesting rapid COVID on patient and  to call back and let them know we already know that they are COVID positive. Patient dropped off FMLA forms to be signed Please fax to Ayaz Chen [phone]

## 2022-01-09 NOTE — TELEPHONE ENCOUNTER
RN=please call patient and verify pharmacy. Last refilled on 11/20/18 and was esent to [place] [address] and now [employer] is requesting for the refills. Patt Crooksr Patient baseline mental status

## 2022-01-19 RX ORDER — AMLODIPINE BESYLATE 10 MG/1
10 TABLET ORAL DAILY
Qty: 90 TABLET | Refills: 0 | Status: SHIPPED | OUTPATIENT
Start: 2022-01-19 | End: 2022-04-28

## 2022-01-19 NOTE — TELEPHONE ENCOUNTER
Refill passed per [facility] protocol.     Requested Prescriptions   Pending Prescriptions Disp Refills    AMLODIPINE 10 MG Oral Tab [Pharmacy Med Name: amLODIPine Besylate 10 MG Oral Tablet] 90 tablet 3     Sig: TAKE 1 TABLET BY MOUTH  DAILY        Hypertensive Medications Protocol Failed - 1/19/2022  1:46 PM        Failed - Appointment in past 6 or next 3 months        Passed - CMP or BMP in past 12 months        Passed - GFR  > 50     Lab Results   Component Value Date    GFRAA 101 08/25/2021                           Recent Outpatient Visits              4 months ago Essential hypertension    [facility], [address], [address], [facility], [facility]    Office Visit    7 months ago [address] [place], [address], GulliverSon APRN    Office Visit    9 months ago Pelvic pain    [facility], Jenny Medina MD    Office Visit    1 year ago Routine physical examination    [facility], [address], Jenny Medina MD    Office Visit    2 years ago Acute recurrent maxillary sinusitis    [address] [facility], GulliverSon, APRСВЕТЛАНА    Office Visit

## 2022-02-16 ENCOUNTER — HOSPITAL ENCOUNTER (OUTPATIENT)
Dept: MRI IMAGING | Age: 67
Discharge: HOME | End: 2022-02-16
Attending: ORTHOPAEDIC SURGERY
Payer: COMMERCIAL

## 2022-02-16 DIAGNOSIS — M48.062 SPINAL STENOSIS OF LUMBAR REGION WITH NEUROGENIC CLAUDICATION: ICD-10-CM

## 2022-02-16 PROCEDURE — 72148 MRI LUMBAR SPINE W/O DYE: CPT | Performed by: ORTHOPAEDIC SURGERY

## 2022-02-24 ENCOUNTER — TELEPHONE (OUTPATIENT)
Dept: INTERNAL MEDICINE CLINIC | Facility: CLINIC | Age: 67
End: 2022-02-24

## 2022-02-25 RX ORDER — OLMESARTAN MEDOXOMIL AND HYDROCHLOROTHIAZIDE 40/25 40; 25 MG/1; MG/1
1 TABLET ORAL DAILY
Qty: 90 TABLET | Refills: 0 | Status: SHIPPED | OUTPATIENT
Start: 2022-02-25 | End: 2022-05-19

## 2022-02-25 NOTE — TELEPHONE ENCOUNTER
Please review; protocol failed. 90 day refill given on 02/25/22, appointment needed for further refills. Requested Prescriptions   Pending Prescriptions Disp Refills    OLMESARTAN MEDOXOMIL-HCTZ 40-25 MG Oral Tab [Pharmacy Med Name: Olmesartan Medoxomil-HCTZ 40-25 MG Oral Tablet] 90 tablet 3     Sig: Take 1 tablet by mouth daily.         Hypertensive Medications Protocol Failed - 2/24/2022  9:26 PM        Failed - Appointment in past 6 or next 3 months        Passed - CMP or BMP in past 12 months        Passed - GFR  > 50     Lab Results   Component Value Date    GFRAA 101 08/25/2021                           Recent Outpatient Visits              1 month ago Spinal stenosis of lumbar region with neurogenic claudication    [address], Loretta Cedeno MD    Office Visit    5 months ago Essential hypertension    [facility], [facility], [address], [address], COLEEN Herr    Office Visit    8 months ago [address] [place], [address], AltheimerSon APRN    Office Visit    10 months ago Pelvic pain    [facility] Amada Sam, MD    Office Visit    1 year ago Routine physical examination    Adam Marcano Amada Sam, MD    Office Visit

## 2022-02-25 NOTE — TELEPHONE ENCOUNTER
Patient is due for an office visit. Please call patient to schedule an appointment in order to receive any further refills after this. VISENZEt message sent.

## 2022-03-07 ENCOUNTER — OFFICE VISIT (OUTPATIENT)
Dept: INTERNAL MEDICINE CLINIC | Facility: CLINIC | Age: 67
End: 2022-03-07
Payer: COMMERCIAL

## 2022-03-07 VITALS
BODY MASS INDEX: 43.68 KG/M2 | SYSTOLIC BLOOD PRESSURE: 127 MMHG | HEIGHT: 71 IN | WEIGHT: 312 LBS | HEART RATE: 70 BPM | DIASTOLIC BLOOD PRESSURE: 80 MMHG

## 2022-03-07 DIAGNOSIS — I10 PRIMARY HYPERTENSION: Primary | ICD-10-CM

## 2022-03-07 DIAGNOSIS — I10 ESSENTIAL HYPERTENSION: ICD-10-CM

## 2022-03-07 PROCEDURE — 3079F DIAST BP 80-89 MM HG: CPT | Performed by: NURSE PRACTITIONER

## 2022-03-07 PROCEDURE — 99213 OFFICE O/P EST LOW 20 MIN: CPT | Performed by: NURSE PRACTITIONER

## 2022-03-07 PROCEDURE — 3008F BODY MASS INDEX DOCD: CPT | Performed by: NURSE PRACTITIONER

## 2022-03-07 PROCEDURE — 3074F SYST BP LT 130 MM HG: CPT | Performed by: NURSE PRACTITIONER

## 2022-03-07 RX ORDER — SILDENAFIL 50 MG/1
50 TABLET, FILM COATED ORAL AS NEEDED
Qty: 6 TABLET | Refills: 0 | Status: SHIPPED | OUTPATIENT
Start: 2022-03-07

## 2022-03-07 NOTE — ASSESSMENT & PLAN NOTE
A/P Hx of hypertension came in blood pressure. Blood pressure 127/80, pulse 70, height 5' 11\" (1.803 m), weight (!) 312 lb (141.5 kg). Blood pressure has improved. He has given up red meat. Patient nephew had a heart attack with stent placement and is asking about screening for coronary artery disease.    - OLmedoxomil hydrochlorothiazide 1 tablet by mouth per day. -Amlodipine 5 mg daily  -Metoprolol 100 mg p.o. Daily. Consider Calcium CT Score.

## 2022-04-02 RX ORDER — METOPROLOL SUCCINATE 100 MG/1
100 TABLET, EXTENDED RELEASE ORAL DAILY
Qty: 90 TABLET | Refills: 1 | Status: SHIPPED | OUTPATIENT
Start: 2022-04-02

## 2022-04-03 NOTE — TELEPHONE ENCOUNTER
Former pt of Dr Shelia Colon, pls advise, thanks in advance.      Refill passed per [facility] protocol   Requested Prescriptions   Pending Prescriptions Disp Refills    METOPROLOL SUCCINATE  MG Oral Tablet 24 Hr [Pharmacy Med Name: Metoprolol Succinate  MG Oral Tablet Extended Release 24 Hour] 90 tablet 3     Sig: TAKE 1 TABLET BY MOUTH  DAILY        Hypertensive Medications Protocol Passed - 4/2/2022  9:05 PM        Passed - CMP or BMP in past 12 months        Passed - Appointment in past 6 or next 3 months        Passed - GFR  > 50     Lab Results   Component Value Date    GFRAA 101 08/25/2021

## 2022-04-28 RX ORDER — AMLODIPINE BESYLATE 10 MG/1
10 TABLET ORAL DAILY
Qty: 90 TABLET | Refills: 1 | Status: SHIPPED | OUTPATIENT
Start: 2022-04-28

## 2022-04-28 NOTE — TELEPHONE ENCOUNTER
Refill passed per [facility] protocol.     Requested Prescriptions   Pending Prescriptions Disp Refills    AMLODIPINE 10 MG Oral Tab [Pharmacy Med Name: amLODIPine Besylate 10 MG Oral Tablet] 90 tablet 3     Sig: TAKE 1 TABLET BY MOUTH  DAILY        Hypertensive Medications Protocol Passed - 4/28/2022  3:56 AM        Passed - CMP or BMP in past 12 months        Passed - Appointment in past 6 or next 3 months        Passed - GFR  > 50     Lab Results   Component Value Date    GFRAA 101 08/25/2021                        Recent Outpatient Visits              1 month ago Primary hypertension    [facility], [address], Son Rodríguez APRN    Office Visit    3 months ago Spinal stenosis of lumbar region with neurogenic claudication    [address], Francine Tavares MD    Office Visit    7 months ago Essential hypertension    [facility], [address], [address], [address], APRN    Office Visit    11 months ago [address] [place], [address], Yarelis Rodríguez APRN    Office Visit    1 year ago Pelvic pain    Adam Russell, Jenny Bishop MD    Office Visit

## 2022-05-19 RX ORDER — OLMESARTAN MEDOXOMIL AND HYDROCHLOROTHIAZIDE 40/25 40; 25 MG/1; MG/1
1 TABLET ORAL DAILY
Qty: 90 TABLET | Refills: 1 | Status: SHIPPED | OUTPATIENT
Start: 2022-05-19

## 2022-05-19 NOTE — TELEPHONE ENCOUNTER
Refill passed per [facility] protocol. Requested Prescriptions   Pending Prescriptions Disp Refills    OLMESARTAN MEDOXOMIL-HCTZ 40-25 MG Oral Tab [Pharmacy Med Name: Olmesartan Medoxomil-HCTZ 40-25 MG Oral Tablet] 90 tablet 3     Sig: Take 1 tablet by mouth daily.         Hypertensive Medications Protocol Passed - 5/18/2022  9:39 PM        Passed - CMP or BMP in past 12 months        Passed - Appointment in past 6 or next 3 months        Passed - GFR  > 50     Lab Results   Component Value Date    GFRAA 101 08/25/2021                     Recent Outpatient Visits              2 months ago Primary hypertension    [place] [facility] [place], [address], Son Rodríguez APRN    Office Visit    3 months ago Spinal stenosis of lumbar region with neurogenic claudication    [address], Loretta Cedeno MD    Office Visit    8 months ago Essential hypertension    [place] [employer] [place], [facility], [address], [address], [address], APRN    Office Visit    11 months ago [facility] [place], [address], Yarelis Rodríguez APRСВЕТЛАНА    Office Visit    1 year ago Pelvic pain    Adam Marcano Amada Sam, MD    Office Visit

## 2022-05-21 RX ORDER — SILDENAFIL 50 MG/1
TABLET, FILM COATED ORAL
Qty: 6 TABLET | Refills: 0 | Status: SHIPPED | OUTPATIENT
Start: 2022-05-21

## 2022-06-06 RX ORDER — SILDENAFIL 50 MG/1
50 TABLET, FILM COATED ORAL AS NEEDED
Qty: 6 TABLET | Refills: 1 | Status: SHIPPED | OUTPATIENT
Start: 2022-06-06 | End: 2022-09-10

## 2022-06-06 NOTE — TELEPHONE ENCOUNTER
Refill passed per [facility] protocol.   Requested Prescriptions   Pending Prescriptions Disp Refills    SILDENAFIL CITRATE 50 MG Oral Tab [Pharmacy Med Name: SILDENAFIL  50MG  TAB] 6 tablet 0     Sig: TAKE 1 TABLET BY MOUTH ONCE DAILY AS NEEDED FOR  ERECTILE DYSFUNCTION        Genitourinary Medications Passed - 6/4/2022  1:09 PM        Passed - Patient does not have pulmonary hypertension on problem list        Passed - Appointment in the past 12 or next 3 months             Recent Outpatient Visits              3 months ago Primary hypertension    [place] [facility] [place], [facility], [address], Son Rodríguez APRN    Office Visit    4 months ago Spinal stenosis of lumbar region with neurogenic claudication    [facility] , Denise Ramos MD    Office Visit    9 months ago Essential hypertension    [place] [employer] [place], [facility], [address], [address], COLEEN Herr    Office Visit    1 year ago [facility] [place], [address], Yarelis Rodríguez APRN    Office Visit    1 year ago Pelvic pain    [facility], [address], Miriam Guerrero MD    Office Visit           Future Appointments         Provider Department Appt Notes    Today Jonas Jasmine, [facility], [address] [facility] last px 7-20-20 Overall health

## 2022-08-03 ENCOUNTER — HOSPITAL ENCOUNTER (OUTPATIENT)
Age: 67
Discharge: HOME | End: 2022-08-03
Payer: COMMERCIAL

## 2022-08-03 ENCOUNTER — NURSE TRIAGE (OUTPATIENT)
Dept: INTERNAL MEDICINE CLINIC | Facility: CLINIC | Age: 67
End: 2022-08-03

## 2022-08-03 VITALS
RESPIRATION RATE: 18 BRPM | HEIGHT: 71 IN | WEIGHT: 300 LBS | OXYGEN SATURATION: 97 % | TEMPERATURE: 99 F | HEART RATE: 78 BPM | BODY MASS INDEX: 42 KG/M2 | SYSTOLIC BLOOD PRESSURE: 130 MMHG | DIASTOLIC BLOOD PRESSURE: 69 MMHG

## 2022-08-03 DIAGNOSIS — U07.1 COVID-19: Primary | ICD-10-CM

## 2022-08-03 LAB — SARS-COV-2 RNA RESP QL NAA+PROBE: DETECTED

## 2022-08-03 PROCEDURE — 99212 OFFICE O/P EST SF 10 MIN: CPT

## 2022-08-03 PROCEDURE — 99213 OFFICE O/P EST LOW 20 MIN: CPT

## 2022-08-03 NOTE — ED INITIAL ASSESSMENT (HPI)
Pt here for coughing since Monday. Fever this am.   Bodyaches, sob. Episode of dizziness a couple hours ago, denies at this time. Chest discomfort when coughing.     Denies n/v.

## 2022-08-10 ENCOUNTER — TELEPHONE (OUTPATIENT)
Dept: INTERNAL MEDICINE CLINIC | Facility: CLINIC | Age: 67
End: 2022-08-10

## 2022-08-10 NOTE — TELEPHONE ENCOUNTER
Patient calling to inform that he has an fmla form that he needs to drop off at the [id number] [place], [place] [address]y. [address] office today for work due to having covid, Please call to update once ready at [phone],JULIETHWJ.

## 2022-08-12 NOTE — TELEPHONE ENCOUNTER
Pt is calling for status of the form he dropped off. Per the patient he needs the paper work by Monday 8-15-22. Pt would like a call back today at [phone]. Per the patient he spoke with someone in Medical Records and was told to contact the doctors office.

## 2022-09-10 RX ORDER — SILDENAFIL 50 MG/1
50 TABLET, FILM COATED ORAL DAILY PRN
Qty: 6 TABLET | Refills: 5 | Status: SHIPPED | OUTPATIENT
Start: 2022-09-10

## 2022-09-10 NOTE — TELEPHONE ENCOUNTER
Refill passed per [facility] protocol.   Requested Prescriptions   Pending Prescriptions Disp Refills    SILDENAFIL CITRATE 50 MG Oral Tab [Pharmacy Med Name: Sildenafil Citrate Oral Tablet 50 MG] 6 tablet 0     Sig: TAKE 1 TABLET BY MOUTH EVERY DAY AS NEEDED FOR ERECTILE DYSFUNCTION        Genitourinary Medications Passed - 9/10/2022 12:14 PM        Passed - Patient does not have pulmonary hypertension on problem list        Passed - In person appointment or virtual visit in the past 12 mos or appointment in next 3 mos       Recent Outpatient Visits              6 months ago Primary hypertension    [place] [facility], [address], [place], Son, APRN    Office Visit    7 months ago Spinal stenosis of lumbar region with neurogenic claudication    [address], Julio Davey MD    Office Visit    1 year ago Essential hypertension    [place] [facility], [address], [address], [address], APRN    Office Visit    1 year ago [address] [place] [address]  S, [address] [facility],[address], [place], Son, APRN    Office Visit    1 year ago Pelvic pain    [place] [facility], [address], Eliana Duarte MD    Office Visit     Future Appointments         Provider Department Appt Notes    In 6 days Juanpablo Dinh MD [facility], [facility] physical                   Future Appointments         Provider Department Appt Notes    In 6 days Juanpablo Dinh MD [facility], [facility] physical          Recent Outpatient Visits              6 months ago Primary hypertension    [facility], [address] [facility], [place], [place], APRN    Office Visit    7 months ago Spinal stenosis of lumbar region with neurogenic claudication    [address], Julio Davey MD    Office Visit    1 year ago Essential hypertension    [place] [facility], [address], [address], [address], [address] Visit    1 year ago [address], Yarelis Rodríguez APRN    Office Visit    1 year ago Pelvic pain    [facility], Lilia Medina MD    Office Visit

## 2022-09-16 ENCOUNTER — OFFICE VISIT (OUTPATIENT)
Dept: INTERNAL MEDICINE CLINIC | Facility: CLINIC | Age: 67
End: 2022-09-16
Payer: COMMERCIAL

## 2022-09-16 ENCOUNTER — TELEPHONE (OUTPATIENT)
Dept: INTERNAL MEDICINE CLINIC | Facility: CLINIC | Age: 67
End: 2022-09-16

## 2022-09-16 ENCOUNTER — LAB ENCOUNTER (OUTPATIENT)
Dept: LAB | Age: 67
End: 2022-09-16
Attending: INTERNAL MEDICINE

## 2022-09-16 VITALS
HEART RATE: 70 BPM | WEIGHT: 302 LBS | SYSTOLIC BLOOD PRESSURE: 120 MMHG | BODY MASS INDEX: 42.28 KG/M2 | DIASTOLIC BLOOD PRESSURE: 84 MMHG | TEMPERATURE: 98 F | HEIGHT: 71 IN | RESPIRATION RATE: 20 BRPM

## 2022-09-16 DIAGNOSIS — Z00.00 ROUTINE PHYSICAL EXAMINATION: Primary | ICD-10-CM

## 2022-09-16 DIAGNOSIS — Z00.00 ROUTINE PHYSICAL EXAMINATION: ICD-10-CM

## 2022-09-16 DIAGNOSIS — Z86.16 HISTORY OF COVID-19: ICD-10-CM

## 2022-09-16 DIAGNOSIS — I10 ESSENTIAL HYPERTENSION: ICD-10-CM

## 2022-09-16 DIAGNOSIS — E66.01 CLASS 3 SEVERE OBESITY DUE TO EXCESS CALORIES WITH BODY MASS INDEX (BMI) OF 40.0 TO 44.9 IN ADULT, UNSPECIFIED WHETHER SERIOUS COMORBIDITY PRESENT (HCC): ICD-10-CM

## 2022-09-16 DIAGNOSIS — N40.1 BENIGN PROSTATIC HYPERPLASIA WITH WEAK URINARY STREAM: ICD-10-CM

## 2022-09-16 DIAGNOSIS — R39.12 BENIGN PROSTATIC HYPERPLASIA WITH WEAK URINARY STREAM: ICD-10-CM

## 2022-09-16 DIAGNOSIS — J45.20 MILD INTERMITTENT ASTHMA WITHOUT COMPLICATION: ICD-10-CM

## 2022-09-16 LAB
ALBUMIN SERPL-MCNC: 3.9 G/DL (ref 3.4–5)
ALBUMIN/GLOB SERPL: 1 {RATIO} (ref 1–2)
ALP LIVER SERPL-CCNC: 53 U/L
ALT SERPL-CCNC: 32 U/L
ANION GAP SERPL CALC-SCNC: 5 MMOL/L (ref 0–18)
AST SERPL-CCNC: 22 U/L (ref 15–37)
BASOPHILS # BLD AUTO: 0.06 X10(3) UL (ref 0–0.2)
BASOPHILS NFR BLD AUTO: 0.8 %
BILIRUB SERPL-MCNC: 0.5 MG/DL (ref 0.1–2)
BILIRUB UR QL: NEGATIVE
BUN BLD-MCNC: 10 MG/DL (ref 7–18)
BUN/CREAT SERPL: 9.2 (ref 10–20)
CALCIUM BLD-MCNC: 9.5 MG/DL (ref 8.5–10.1)
CHLORIDE SERPL-SCNC: 104 MMOL/L (ref 98–112)
CHOLEST SERPL-MCNC: 196 MG/DL (ref ?–200)
CLARITY UR: CLEAR
CO2 SERPL-SCNC: 29 MMOL/L (ref 21–32)
COLOR UR: YELLOW
COMPLEXED PSA SERPL-MCNC: 1.77 NG/ML (ref ?–4)
CREAT BLD-MCNC: 1.09 MG/DL
DEPRECATED RDW RBC AUTO: 48.6 FL (ref 35.1–46.3)
EOSINOPHIL # BLD AUTO: 0.33 X10(3) UL (ref 0–0.7)
EOSINOPHIL NFR BLD AUTO: 4.4 %
ERYTHROCYTE [DISTWIDTH] IN BLOOD BY AUTOMATED COUNT: 18.6 % (ref 11–15)
EST. AVERAGE GLUCOSE BLD GHB EST-MCNC: 120 MG/DL (ref 68–126)
FASTING PATIENT LIPID ANSWER: YES
FASTING STATUS PATIENT QL REPORTED: YES
GFR SERPLBLD BASED ON 1.73 SQ M-ARVRAT: 75 ML/MIN/1.73M2 (ref 60–?)
GLOBULIN PLAS-MCNC: 3.9 G/DL (ref 2.8–4.4)
GLUCOSE BLD-MCNC: 92 MG/DL (ref 70–99)
GLUCOSE UR-MCNC: NEGATIVE MG/DL
HBA1C MFR BLD: 5.8 % (ref ?–5.7)
HCT VFR BLD AUTO: 47.8 %
HDLC SERPL-MCNC: 48 MG/DL (ref 40–59)
HGB BLD-MCNC: 14.8 G/DL
HGB UR QL STRIP.AUTO: NEGATIVE
IMM GRANULOCYTES # BLD AUTO: 0.02 X10(3) UL (ref 0–1)
IMM GRANULOCYTES NFR BLD: 0.3 %
KETONES UR-MCNC: NEGATIVE MG/DL
LDLC SERPL CALC-MCNC: 129 MG/DL (ref ?–100)
LEUKOCYTE ESTERASE UR QL STRIP.AUTO: NEGATIVE
LYMPHOCYTES # BLD AUTO: 2.63 X10(3) UL (ref 1–4)
LYMPHOCYTES NFR BLD AUTO: 34.8 %
MCH RBC QN AUTO: 23.6 PG (ref 26–34)
MCHC RBC AUTO-ENTMCNC: 31 G/DL (ref 31–37)
MCV RBC AUTO: 76.2 FL
MONOCYTES # BLD AUTO: 0.64 X10(3) UL (ref 0.1–1)
MONOCYTES NFR BLD AUTO: 8.5 %
NEUTROPHILS # BLD AUTO: 3.87 X10 (3) UL (ref 1.5–7.7)
NEUTROPHILS # BLD AUTO: 3.87 X10(3) UL (ref 1.5–7.7)
NEUTROPHILS NFR BLD AUTO: 51.2 %
NITRITE UR QL STRIP.AUTO: NEGATIVE
NONHDLC SERPL-MCNC: 148 MG/DL (ref ?–130)
OSMOLALITY SERPL CALC.SUM OF ELEC: 285 MOSM/KG (ref 275–295)
PH UR: 6 [PH] (ref 5–8)
PLATELET # BLD AUTO: 395 10(3)UL (ref 150–450)
POTASSIUM SERPL-SCNC: 3.6 MMOL/L (ref 3.5–5.1)
PROT SERPL-MCNC: 7.8 G/DL (ref 6.4–8.2)
PROT UR-MCNC: NEGATIVE MG/DL
RBC # BLD AUTO: 6.27 X10(6)UL
SODIUM SERPL-SCNC: 138 MMOL/L (ref 136–145)
SP GR UR STRIP: 1.02 (ref 1–1.03)
TRIGL SERPL-MCNC: 108 MG/DL (ref 30–149)
TSI SER-ACNC: 3.08 MIU/ML (ref 0.36–3.74)
UROBILINOGEN UR STRIP-ACNC: 0.2
VIT B12 SERPL-MCNC: 755 PG/ML (ref 193–986)
VLDLC SERPL CALC-MCNC: 19 MG/DL (ref 0–30)
WBC # BLD AUTO: 7.6 X10(3) UL (ref 4–11)

## 2022-09-16 PROCEDURE — 99397 PER PM REEVAL EST PAT 65+ YR: CPT | Performed by: INTERNAL MEDICINE

## 2022-09-16 PROCEDURE — 36415 COLL VENOUS BLD VENIPUNCTURE: CPT

## 2022-09-16 PROCEDURE — 84443 ASSAY THYROID STIM HORMONE: CPT

## 2022-09-16 PROCEDURE — 80053 COMPREHEN METABOLIC PANEL: CPT

## 2022-09-16 PROCEDURE — 81003 URINALYSIS AUTO W/O SCOPE: CPT | Performed by: INTERNAL MEDICINE

## 2022-09-16 PROCEDURE — 85060 BLOOD SMEAR INTERPRETATION: CPT

## 2022-09-16 PROCEDURE — 82607 VITAMIN B-12: CPT

## 2022-09-16 PROCEDURE — 80061 LIPID PANEL: CPT

## 2022-09-16 PROCEDURE — 85025 COMPLETE CBC W/AUTO DIFF WBC: CPT

## 2022-09-16 PROCEDURE — 3008F BODY MASS INDEX DOCD: CPT | Performed by: INTERNAL MEDICINE

## 2022-09-16 PROCEDURE — 83036 HEMOGLOBIN GLYCOSYLATED A1C: CPT

## 2022-09-16 PROCEDURE — 3074F SYST BP LT 130 MM HG: CPT | Performed by: INTERNAL MEDICINE

## 2022-09-16 PROCEDURE — 3079F DIAST BP 80-89 MM HG: CPT | Performed by: INTERNAL MEDICINE

## 2022-09-16 RX ORDER — NETARSUDIL 0.2 MG/ML
SOLUTION/ DROPS OPHTHALMIC; TOPICAL
COMMUNITY
Start: 2022-06-04 | End: 2022-09-16 | Stop reason: ALTCHOICE

## 2022-09-20 RX ORDER — METOPROLOL SUCCINATE 100 MG/1
100 TABLET, EXTENDED RELEASE ORAL DAILY
Qty: 90 TABLET | Refills: 1 | Status: SHIPPED | OUTPATIENT
Start: 2022-09-20

## 2022-09-20 NOTE — TELEPHONE ENCOUNTER
Please review. Protocol failed / No protocol. Requested Prescriptions   Pending Prescriptions Disp Refills    METOPROLOL SUCCINATE  MG Oral Tablet 24 Hr [Pharmacy Med Name: Metoprolol Succinate  MG Oral Tablet Extended Release 24 Hour] 90 tablet 3     Sig: TAKE 1 TABLET BY MOUTH  DAILY        Hypertensive Medications Protocol Failed - 9/16/2022 10:49 AM        Failed - CMP or BMP in past 6 months     Recent Results (from the past 4392 hour(s))   COMP METABOLIC PANEL (14)    Collection Time: 09/16/22 11:00 AM   Result Value Ref Range    Glucose 92 70 - 99 mg/dL    Sodium 138 136 - 145 mmol/L    Potassium 3.6 3.5 - 5.1 mmol/L    Chloride 104 98 - 112 mmol/L    CO2 29.0 21.0 - 32.0 mmol/L    Anion Gap 5 0 - 18 mmol/L    BUN 10 7 - 18 mg/dL    Creatinine 1.09 0.70 - 1.30 mg/dL    BUN/CREA Ratio 9.2 (L) 10.0 - 20.0    Calcium, Total 9.5 8.5 - 10.1 mg/dL    Calculated Osmolality 285 275 - 295 mOsm/kg    eGFR-Cr 75 >=60 mL/min/1.73m2    ALT 32 16 - 61 U/L    AST 22 15 - 37 U/L    Alkaline Phosphatase 53 45 - 117 U/L    Bilirubin, Total 0.5 0.1 - 2.0 mg/dL    Total Protein 7.8 6.4 - 8.2 g/dL    Albumin 3.9 3.4 - 5.0 g/dL    Globulin  3.9 2.8 - 4.4 g/dL    A/G Ratio 1.0 1.0 - 2.0    Patient Fasting for CMP? Yes      *Note: Due to a large number of results and/or encounters for the requested time period, some results have not been displayed. A complete set of results can be found in Results Review.                  Failed - EGFRCR or GFRAA > 50     GFR Evaluation  EGFRCR: 75 , resulted on 9/16/2022            Passed - In person appointment in the past 12 or next 3 months       Recent Outpatient Visits              4 days ago Routine physical examination    Cierra Ramirez MD    Office Visit    6 months ago Primary hypertension    [facility], [facility], [address], [place], [address]    Office Visit    7 months ago Spinal stenosis of lumbar region with neurogenic claudication    [address], David Winter MD    Office Visit    1 year ago Essential hypertension    [place] [employer], [facility], [address], [address], APRN    Office Visit    1 year ago [address] [place], [address] [facility], APRN    Office Visit                 Passed - Last BP reading less than 140/90     BP Readings from Last 1 Encounters:  09/16/22 : 120/84                Passed - In person appointment or virtual visit in the past 6 months       Recent Outpatient Visits              4 days ago Routine physical examination    Gordo Noel MD    Office Visit    6 months ago Primary hypertension    [place] [employer] [place], [facility], [address] [facility], [place], [place], APRN    Office Visit    7 months ago Spinal stenosis of lumbar region with neurogenic claudication    [address], David Winter MD    Office Visit    1 year ago Essential hypertension    [place] [employer], [facility], [address], [address], APRN    Office Visit    1 year ago [address] [place], [address] [facility], [place], [place], APRN    Office Visit                       Recent Outpatient Visits              4 days ago Routine physical examination    Gordo Noel MD    Office Visit    6 months ago Primary hypertension    [place] [employer] [place], [facility], [address] [facility], [place], [place], APRN    Office Visit    7 months ago Spinal stenosis of lumbar region with neurogenic claudication    [address], David Winter MD    Office Visit    1 year ago Essential hypertension    [place] [employer], [facility], [address], [address], APRN    Office Visit    1 year ago [address] [place], [address], [place], [place], APRN    Office Visit

## 2022-09-21 RX ORDER — AMLODIPINE BESYLATE 10 MG/1
10 TABLET ORAL DAILY
Qty: 90 TABLET | Refills: 1 | Status: SHIPPED | OUTPATIENT
Start: 2022-09-21 | End: 2023-02-13

## 2022-09-21 NOTE — TELEPHONE ENCOUNTER
Patient is requesting refill of amLODIPine 10 MG Oral Tab. Patient is out of medication.     [address] - [facility]     Rest of the refill to be sent to     [facility]

## 2022-09-21 NOTE — TELEPHONE ENCOUNTER
Refill passed per [facility] protocol. Requested Prescriptions   Pending Prescriptions Disp Refills    amLODIPine 10 MG Oral Tab 90 tablet 1     Sig: Take 1 tablet (10 mg total) by mouth daily.         Hypertensive Medications Protocol Passed - 9/21/2022  5:06 PM        Passed - In person appointment in the past 12 or next 3 months       Recent Outpatient Visits              5 days ago Routine physical examination    Gil Pruitt, [place], Claudean Man, MD    Office Visit    6 months ago Primary hypertension    [address] [place], [address], [facility], [facility]    Office Visit    7 months ago Spinal stenosis of lumbar region with neurogenic claudication    [facility] Stephen Miller, Shereen Bergman MD    Office Visit    1 year ago Essential hypertension    [facility], [address], [address], Angelic Ricci, APRN    Office Visit    1 year ago [address] [place]  [address], [address], [facility], [facility]    Office Visit                 Passed - Last BP reading less than 140/90     BP Readings from Last 1 Encounters:  09/16/22 : 120/84                Passed - CMP or BMP in past 6 months     Recent Results (from the past 4392 hour(s))   COMP METABOLIC PANEL (14)    Collection Time: 09/16/22 11:00 AM   Result Value Ref Range    Glucose 92 70 - 99 mg/dL    Sodium 138 136 - 145 mmol/L    Potassium 3.6 3.5 - 5.1 mmol/L    Chloride 104 98 - 112 mmol/L    CO2 29.0 21.0 - 32.0 mmol/L    Anion Gap 5 0 - 18 mmol/L    BUN 10 7 - 18 mg/dL    Creatinine 1.09 0.70 - 1.30 mg/dL    BUN/CREA Ratio 9.2 (L) 10.0 - 20.0    Calcium, Total 9.5 8.5 - 10.1 mg/dL    Calculated Osmolality 285 275 - 295 mOsm/kg    eGFR-Cr 75 >=60 mL/min/1.73m2    ALT 32 16 - 61 U/L    AST 22 15 - 37 U/L    Alkaline Phosphatase 53 45 - 117 U/L    Bilirubin, Total 0.5 0.1 - 2.0 mg/dL    Total Protein 7.8 6.4 - 8.2 g/dL    Albumin 3.9 3.4 - 5.0 g/dL    Globulin  3.9 2.8 - 4.4 g/dL    A/G Ratio 1.0 1.0 - 2.0 Patient Fasting for CMP? Yes      *Note: Due to a large number of results and/or encounters for the requested time period, some results have not been displayed. A complete set of results can be found in Results Review.                  Passed - In person appointment or virtual visit in the past 6 months       Recent Outpatient Visits              5 days ago Routine physical examination    Lazarus Mobley MD    Office Visit    6 months ago Primary hypertension    [address], [address], [facility], APRN    Office Visit    7 months ago Spinal stenosis of lumbar region with neurogenic claudication    [address], Pedro Luis Culp MD    Office Visit    1 year ago Essential hypertension    [address] [place], [address], [address], [address], APRN    Office Visit    1 year ago [address] [place], [address] [facility], [facility], [address] or GFRAA > 50     GFR Evaluation  EGFRCR: 75 , resulted on 9/16/2022                  Recent Outpatient Visits              5 days ago Routine physical examination    Lazarus Mobley MD    Office Visit    6 months ago Primary hypertension    [address] [place], [address], [facility], APRN    Office Visit    7 months ago Spinal stenosis of lumbar region with neurogenic claudication    [address], Pedro Luis Culp MD    Office Visit    1 year ago Essential hypertension    [address] [place], [address], [address], [address], APRN    Office Visit    1 year ago [address] [place], [address] [facility], [facility], APRN    Office Visit

## 2022-09-28 NOTE — TELEPHONE ENCOUNTER
Dr. Luna Hernandez     Please sign off on form:Recert FMLA  -Highlight the patient and hit \"Chart\" button. -In Chart Review, w/in the Encounter tab - click 1 time on the Telephone call encounter for 9/16/22 Scroll down the telephone encounter.  -Click \"scan on\" blue Hyperlink under \"Media\" heading for Fish Hernandez 9/28/22  w/in the telephone enc.  -Click on Acknowledge button at the bottom right corner and left-click onto image, signature stamp appears and drag signature to Provider signature line. Stamp will turn blue. Close window.      Thank you,    Mickey Paul

## 2022-10-21 RX ORDER — SILDENAFIL 50 MG/1
TABLET, FILM COATED ORAL
Qty: 6 TABLET | Refills: 5 | OUTPATIENT
Start: 2022-10-21

## 2022-11-03 RX ORDER — OLMESARTAN MEDOXOMIL AND HYDROCHLOROTHIAZIDE 40/25 40; 25 MG/1; MG/1
1 TABLET ORAL DAILY
Qty: 90 TABLET | Refills: 1 | Status: SHIPPED | OUTPATIENT
Start: 2022-11-03

## 2022-11-04 NOTE — TELEPHONE ENCOUNTER
Refill passed per [facility] [facility] protocol. .  Requested Prescriptions   Pending Prescriptions Disp Refills    OLMESARTAN MEDOXOMIL-HCTZ 40-25 MG Oral Tab [Pharmacy Med Name: Olmesartan Medoxomil-HCTZ 40-25 MG Oral Tablet] 90 tablet 3     Sig: Take 1 tablet by mouth daily.        Hypertensive Medications Protocol Passed - 11/3/2022  7:25 PM        Passed - In person appointment in the past 12 or next 3 months     Recent Outpatient Visits              1 month ago Routine physical examination    Gil Pruitt, Camila Medina MD    Office Visit    8 months ago Primary hypertension    [place] [employer] [facility], [address], [facility], APRN    Office Visit    9 months ago Spinal stenosis of lumbar region with neurogenic claudication    [address], Carie Funez MD    Office Visit    1 year ago Essential hypertension    [place] [employer] [facility], [address], [address], Mark Steinberg, APRN    Office Visit    1 year ago [address] [place]  [address], [address], [place] APRСВЕТЛАНА    Office Visit                      Passed - Last BP reading less than 140/90     BP Readings from Last 1 Encounters:  09/16/22 : 120/84              Passed - CMP or BMP in past 6 months     Recent Results (from the past 4392 hour(s))   COMP METABOLIC PANEL (14)    Collection Time: 09/16/22 11:00 AM   Result Value Ref Range    Glucose 92 70 - 99 mg/dL    Sodium 138 136 - 145 mmol/L    Potassium 3.6 3.5 - 5.1 mmol/L    Chloride 104 98 - 112 mmol/L    CO2 29.0 21.0 - 32.0 mmol/L    Anion Gap 5 0 - 18 mmol/L    BUN 10 7 - 18 mg/dL    Creatinine 1.09 0.70 - 1.30 mg/dL    BUN/CREA Ratio 9.2 (L) 10.0 - 20.0    Calcium, Total 9.5 8.5 - 10.1 mg/dL    Calculated Osmolality 285 275 - 295 mOsm/kg    eGFR-Cr 75 >=60 mL/min/1.73m2    ALT 32 16 - 61 U/L    AST 22 15 - 37 U/L    Alkaline Phosphatase 53 45 - 117 U/L    Bilirubin, Total 0.5 0.1 - 2.0 mg/dL    Total Protein 7.8 6.4 - 8.2 g/dL    Albumin 3.9 3.4 - 5.0 g/dL    Globulin  3.9 2.8 - 4.4 g/dL    A/G Ratio 1.0 1.0 - 2.0    Patient Fasting for CMP? Yes      *Note: Due to a large number of results and/or encounters for the requested time period, some results have not been displayed. A complete set of results can be found in Results Review.                Passed - In person appointment or virtual visit in the past 6 months     Recent Outpatient Visits              1 month ago Routine physical examination    Migel Reed MD    Office Visit    8 months ago Primary hypertension    [place] [facility] [facility], [address] [place] [address], [place] [facility], APRN    Office Visit    9 months ago Spinal stenosis of lumbar region with neurogenic claudication    [address], Francine Tavares MD    Office Visit    1 year ago Essential hypertension    [facility], [address], [address], [address], APRN    Office Visit    1 year ago [address] [place], [address] [place] [address], APRN    Office Visit                      Passed - EGFRCR or GFRAA > 50     GFR Evaluation  EGFRCR: 75 , resulted on 9/16/2022               Recent Outpatient Visits              1 month ago Routine physical examination    iMgel Reed MD    Office Visit    8 months ago Primary hypertension    [place] [facility] [facility], [address] [facility], [facility], APRN    Office Visit    9 months ago Spinal stenosis of lumbar region with neurogenic claudication    [address], Francine Tavares MD    Office Visit    1 year ago Essential hypertension    [facility], [address], [address], APRN    Office Visit    1 year ago [address] [place], [address] [facility], [facility], APRСВЕТЛАНА    Office Visit

## 2022-11-14 ENCOUNTER — IMMUNIZATION (OUTPATIENT)
Dept: LAB | Age: 67
End: 2022-11-14
Attending: EMERGENCY MEDICINE
Payer: COMMERCIAL

## 2022-11-14 DIAGNOSIS — Z23 NEED FOR VACCINATION: Primary | ICD-10-CM

## 2022-11-14 PROCEDURE — 90471 IMMUNIZATION ADMIN: CPT

## 2022-11-14 PROCEDURE — 90662 IIV NO PRSV INCREASED AG IM: CPT

## 2022-11-14 PROCEDURE — 0124A SARSCOV2 VAC BVL 30MCG/0.3ML: CPT

## 2023-03-17 ENCOUNTER — OFFICE VISIT (OUTPATIENT)
Dept: INTERNAL MEDICINE CLINIC | Facility: CLINIC | Age: 68
End: 2023-03-17

## 2023-03-17 VITALS
RESPIRATION RATE: 20 BRPM | SYSTOLIC BLOOD PRESSURE: 120 MMHG | TEMPERATURE: 98 F | HEART RATE: 66 BPM | HEIGHT: 71 IN | DIASTOLIC BLOOD PRESSURE: 78 MMHG | BODY MASS INDEX: 42.79 KG/M2 | WEIGHT: 305.63 LBS

## 2023-03-17 DIAGNOSIS — R39.12 BENIGN PROSTATIC HYPERPLASIA WITH WEAK URINARY STREAM: ICD-10-CM

## 2023-03-17 DIAGNOSIS — N52.9 ERECTILE DYSFUNCTION, UNSPECIFIED ERECTILE DYSFUNCTION TYPE: ICD-10-CM

## 2023-03-17 DIAGNOSIS — J45.20 MILD INTERMITTENT ASTHMA WITHOUT COMPLICATION: ICD-10-CM

## 2023-03-17 DIAGNOSIS — I10 ESSENTIAL HYPERTENSION: Primary | ICD-10-CM

## 2023-03-17 DIAGNOSIS — N40.1 BENIGN PROSTATIC HYPERPLASIA WITH WEAK URINARY STREAM: ICD-10-CM

## 2023-03-17 DIAGNOSIS — E66.01 CLASS 3 SEVERE OBESITY DUE TO EXCESS CALORIES WITH BODY MASS INDEX (BMI) OF 40.0 TO 44.9 IN ADULT, UNSPECIFIED WHETHER SERIOUS COMORBIDITY PRESENT (HCC): ICD-10-CM

## 2023-03-17 PROCEDURE — 3074F SYST BP LT 130 MM HG: CPT | Performed by: INTERNAL MEDICINE

## 2023-03-17 PROCEDURE — 99214 OFFICE O/P EST MOD 30 MIN: CPT | Performed by: INTERNAL MEDICINE

## 2023-03-17 PROCEDURE — 3008F BODY MASS INDEX DOCD: CPT | Performed by: INTERNAL MEDICINE

## 2023-03-17 PROCEDURE — 3078F DIAST BP <80 MM HG: CPT | Performed by: INTERNAL MEDICINE

## 2023-03-17 RX ORDER — SILDENAFIL 50 MG/1
50 TABLET, FILM COATED ORAL DAILY PRN
Qty: 18 TABLET | Refills: 1 | Status: SHIPPED | OUTPATIENT
Start: 2023-03-17

## 2023-04-20 RX ORDER — OLMESARTAN MEDOXOMIL AND HYDROCHLOROTHIAZIDE 40/25 40; 25 MG/1; MG/1
1 TABLET ORAL DAILY
Qty: 90 TABLET | Refills: 1 | Status: SHIPPED | OUTPATIENT
Start: 2023-04-20

## 2023-04-20 NOTE — TELEPHONE ENCOUNTER
Please review. Protocol Failed or has No Protocol. Requested Prescriptions   Pending Prescriptions Disp Refills    OLMESARTAN MEDOXOMIL-HCTZ 40-25 MG Oral Tab [Pharmacy Med Name: Olmesartan Medoxomil-HCTZ 40-25 MG Oral Tablet] 90 tablet 3     Sig: Take 1 tablet by mouth in the morning. Hypertensive Medications Protocol Failed - 4/19/2023  9:36 PM        Failed - CMP or BMP in past 6 months     No results found for this or any previous visit (from the past 4392 hour(s)).               Passed - In person appointment in the past 12 or next 3 months     Recent Outpatient Visits              1 month ago Essential hypertension    Christine Zaldivar MD    Office Visit    7 months ago Routine physical examination    Emigdio Zaldivar MD    Office Visit    1 year ago Primary hypertension    [facility], [address],[facility] [facility]    Office Visit    1 year ago Spinal stenosis of lumbar region with neurogenic claudication    [address], Isaiah Abraham MD    Office Visit    1 year ago Essential hypertension    Sabina John, [address], [place] Serafin Lipscomb, APRN    Office Visit                      Passed - Last BP reading less than 140/90     BP Readings from Last 1 Encounters:  03/17/23 : 120/78                Passed - In person appointment or virtual visit in the past 6 months     Recent Outpatient Visits              1 month ago Essential hypertension    Christine Zaldivar MD    Office Visit    7 months ago Routine physical examination    Christine Zaldivar MD    Office Visit    1 year ago Primary hypertension    [facility], [address],[facility]    Office Visit    1 year ago Spinal stenosis of lumbar region with neurogenic claudication    [address], Shereen Bergman MD    Office Visit    1 year ago Essential hypertension    [address], [place] Tracie Lord, APRN    Office Visit                      Passed - EGFRCR or GFRAA > 50     GFR Evaluation  EGFRCR: 75 , resulted on 9/16/2022                     Recent Outpatient Visits              1 month ago Essential hypertension    Nelma Eisenmenger, Stephanie Stephens MD    Office Visit    7 months ago Routine physical examination    Nelma Eisenmenger, Marcus Mary Jane Coreas MD    Office Visit    1 year ago Primary hypertension    [facility], [address], [facility], APRN    Office Visit    1 year ago Spinal stenosis of lumbar region with neurogenic claudication    [address], Shereen Bergman MD    Office Visit    1 year ago Essential hypertension    Dalton Alexander, [address], [address], [address], APRN    Office Visit

## 2023-05-02 RX ORDER — AMLODIPINE BESYLATE 10 MG/1
10 TABLET ORAL DAILY
Qty: 90 TABLET | Refills: 3 | Status: SHIPPED | OUTPATIENT
Start: 2023-05-02

## 2023-05-02 NOTE — TELEPHONE ENCOUNTER
Please review; protocol failed. Or has no protocol    Requested Prescriptions   Pending Prescriptions Disp Refills    AMLODIPINE 10 MG Oral Tab [Pharmacy Med Name: amLODIPine Besylate 10 MG Oral Tablet] 90 tablet 3     Sig: TAKE 1 TABLET BY MOUTH  DAILY       Hypertensive Medications Protocol Failed - 4/30/2023  9:57 PM        Failed - CMP or BMP in past 6 months     No results found for this or any previous visit (from the past 4392 hour(s)).               Passed - In person appointment in the past 12 or next 3 months     Recent Outpatient Visits              1 month ago Essential hypertension    Mellissa Parker MD    Office Visit    7 months ago Routine physical examination    Emigdio Parker MD    Office Visit    1 year ago Primary hypertension    [facility], [address], [facility], APRN    Office Visit    1 year ago Spinal stenosis of lumbar region with neurogenic claudication    [address], Marielena Mcclure MD    Office Visit    1 year ago Essential hypertension    [address], [address], Marques Pineda, APRN    Office Visit                      Passed - Last BP reading less than 140/90     BP Readings from Last 1 Encounters:  03/17/23 : 120/78                Passed - In person appointment or virtual visit in the past 6 months     Recent Outpatient Visits              1 month ago Essential hypertension    [address], [facility]    Office Visit    7 months ago Routine physical examination    Mellissa Parker MD    Office Visit    1 year ago Primary hypertension    [facility], [address], [facility], APRN    Office Visit    1 year ago Spinal stenosis of lumbar region with neurogenic claudication    [address], Marielena Mcclure MD    Office Visit    1 year ago Essential hypertension    Caitcheko Tawanna, Marques Pineda, APRN    Office Visit                      Passed - EGFRCR or GFRAA > 50     GFR Evaluation  EGFRCR: 75 , resulted on 9/16/2022                  Recent Outpatient Visits              1 month ago Essential hypertension    Mellissa Parker MD    Office Visit    7 months ago Routine physical examination    Mellissa Parker MD    Office Visit    1 year ago Primary hypertension    [facility], [address], [facility], APRN    Office Visit    1 year ago Spinal stenosis of lumbar region with neurogenic claudication    [address], Marielena Mcclure MD    Office Visit    1 year ago Essential hypertension    [address], [address], [address], [facility], APRN    Office Visit

## 2023-08-10 ENCOUNTER — OFFICE VISIT (OUTPATIENT)
Dept: INTERNAL MEDICINE CLINIC | Facility: CLINIC | Age: 68
End: 2023-08-10

## 2023-08-10 VITALS
TEMPERATURE: 103 F | WEIGHT: 299 LBS | HEIGHT: 71 IN | SYSTOLIC BLOOD PRESSURE: 118 MMHG | BODY MASS INDEX: 41.86 KG/M2 | DIASTOLIC BLOOD PRESSURE: 74 MMHG | HEART RATE: 94 BPM

## 2023-08-10 DIAGNOSIS — J11.1 FLU: ICD-10-CM

## 2023-08-10 DIAGNOSIS — R50.9 FEVER, UNSPECIFIED FEVER CAUSE: Primary | ICD-10-CM

## 2023-08-10 LAB
COVID19 BINAX NOW ANTIGEN: NOT DETECTED
OPERATOR ID: NORMAL
POCT LOT NUMBER: NORMAL

## 2023-08-10 PROCEDURE — 3078F DIAST BP <80 MM HG: CPT | Performed by: NURSE PRACTITIONER

## 2023-08-10 PROCEDURE — 3074F SYST BP LT 130 MM HG: CPT | Performed by: NURSE PRACTITIONER

## 2023-08-10 PROCEDURE — 3008F BODY MASS INDEX DOCD: CPT | Performed by: NURSE PRACTITIONER

## 2023-08-10 PROCEDURE — 99213 OFFICE O/P EST LOW 20 MIN: CPT | Performed by: NURSE PRACTITIONER

## 2023-08-10 RX ORDER — AZITHROMYCIN 250 MG/1
TABLET, FILM COATED ORAL
Qty: 6 TABLET | Refills: 0 | Status: SHIPPED | OUTPATIENT
Start: 2023-08-10 | End: 2023-08-15

## 2023-08-10 NOTE — PATIENT INSTRUCTIONS
Patient with flu symptoms. Plan  Hydrate with fluids  Tylenol two tabs every six hours. . Not to exceed 4 grams in one day. Drink Hot tea with lemon  Drink Hot tea with honey  Gargle with warm salt water if you have a sore throat.   Shonna Kocher

## 2023-08-10 NOTE — ASSESSMENT & PLAN NOTE
Patient with flu symptoms. High fever 103. Plan  Hydrate with fluids  Tylenol two tabs every six hours. . Not to exceed 4 grams in one day. Drink Hot tea with lemon  Drink Hot tea with honey  Gargle with warm salt water if you have a sore throat.   Shonna Kocher

## 2023-11-06 ENCOUNTER — TELEPHONE (OUTPATIENT)
Dept: INTERNAL MEDICINE CLINIC | Facility: CLINIC | Age: 68
End: 2023-11-06

## 2023-11-06 ENCOUNTER — OFFICE VISIT (OUTPATIENT)
Dept: INTERNAL MEDICINE CLINIC | Facility: CLINIC | Age: 68
End: 2023-11-06

## 2023-11-06 ENCOUNTER — LAB ENCOUNTER (OUTPATIENT)
Dept: LAB | Age: 68
End: 2023-11-06
Attending: NURSE PRACTITIONER
Payer: COMMERCIAL

## 2023-11-06 VITALS
WEIGHT: 292 LBS | HEART RATE: 73 BPM | BODY MASS INDEX: 40.88 KG/M2 | DIASTOLIC BLOOD PRESSURE: 86 MMHG | HEIGHT: 71 IN | SYSTOLIC BLOOD PRESSURE: 128 MMHG

## 2023-11-06 DIAGNOSIS — E55.9 VITAMIN D DEFICIENCY: ICD-10-CM

## 2023-11-06 DIAGNOSIS — I10 ESSENTIAL HYPERTENSION: ICD-10-CM

## 2023-11-06 DIAGNOSIS — Z00.00 ANNUAL PHYSICAL EXAM: ICD-10-CM

## 2023-11-06 DIAGNOSIS — E53.8 VITAMIN B 12 DEFICIENCY: ICD-10-CM

## 2023-11-06 DIAGNOSIS — I10 ESSENTIAL HYPERTENSION: Primary | ICD-10-CM

## 2023-11-06 LAB
ALBUMIN SERPL-MCNC: 4.4 G/DL (ref 3.2–4.8)
ALBUMIN/GLOB SERPL: 1.4 {RATIO} (ref 1–2)
ALP LIVER SERPL-CCNC: 54 U/L
ALT SERPL-CCNC: 14 U/L
ANION GAP SERPL CALC-SCNC: 7 MMOL/L (ref 0–18)
AST SERPL-CCNC: 20 U/L (ref ?–34)
BILIRUB SERPL-MCNC: 0.4 MG/DL (ref 0.2–1.1)
BUN BLD-MCNC: 8 MG/DL (ref 9–23)
BUN/CREAT SERPL: 8.3 (ref 10–20)
CALCIUM BLD-MCNC: 9.8 MG/DL (ref 8.7–10.4)
CHLORIDE SERPL-SCNC: 100 MMOL/L (ref 98–112)
CHOLEST SERPL-MCNC: 183 MG/DL (ref ?–200)
CO2 SERPL-SCNC: 30 MMOL/L (ref 21–32)
COMPLEXED PSA SERPL-MCNC: 1.65 NG/ML (ref ?–4)
COMPLEXED PSA SERPL-MCNC: 2.33 NG/ML (ref ?–4)
CREAT BLD-MCNC: 0.96 MG/DL
EGFRCR SERPLBLD CKD-EPI 2021: 87 ML/MIN/1.73M2 (ref 60–?)
FASTING PATIENT LIPID ANSWER: YES
FASTING STATUS PATIENT QL REPORTED: YES
GLOBULIN PLAS-MCNC: 3.2 G/DL (ref 2.8–4.4)
GLUCOSE BLD-MCNC: 91 MG/DL (ref 70–99)
HDLC SERPL-MCNC: 45 MG/DL (ref 40–59)
LDLC SERPL CALC-MCNC: 125 MG/DL (ref ?–100)
NONHDLC SERPL-MCNC: 138 MG/DL (ref ?–130)
OSMOLALITY SERPL CALC.SUM OF ELEC: 282 MOSM/KG (ref 275–295)
POTASSIUM SERPL-SCNC: 3.9 MMOL/L (ref 3.5–5.1)
PROT SERPL-MCNC: 7.6 G/DL (ref 5.7–8.2)
SODIUM SERPL-SCNC: 137 MMOL/L (ref 136–145)
TRIGL SERPL-MCNC: 67 MG/DL (ref 30–149)
TSI SER-ACNC: 1.94 MIU/ML (ref 0.55–4.78)
VIT B12 SERPL-MCNC: 1014 PG/ML (ref 211–911)
VIT D+METAB SERPL-MCNC: 28.4 NG/ML (ref 30–100)
VLDLC SERPL CALC-MCNC: 12 MG/DL (ref 0–30)

## 2023-11-06 PROCEDURE — 3008F BODY MASS INDEX DOCD: CPT | Performed by: NURSE PRACTITIONER

## 2023-11-06 PROCEDURE — 85027 COMPLETE CBC AUTOMATED: CPT

## 2023-11-06 PROCEDURE — 82607 VITAMIN B-12: CPT

## 2023-11-06 PROCEDURE — 99397 PER PM REEVAL EST PAT 65+ YR: CPT | Performed by: NURSE PRACTITIONER

## 2023-11-06 PROCEDURE — 90662 IIV NO PRSV INCREASED AG IM: CPT | Performed by: NURSE PRACTITIONER

## 2023-11-06 PROCEDURE — 90471 IMMUNIZATION ADMIN: CPT | Performed by: NURSE PRACTITIONER

## 2023-11-06 PROCEDURE — 3079F DIAST BP 80-89 MM HG: CPT | Performed by: NURSE PRACTITIONER

## 2023-11-06 PROCEDURE — 82306 VITAMIN D 25 HYDROXY: CPT

## 2023-11-06 PROCEDURE — 84443 ASSAY THYROID STIM HORMONE: CPT

## 2023-11-06 PROCEDURE — 85060 BLOOD SMEAR INTERPRETATION: CPT

## 2023-11-06 PROCEDURE — 80061 LIPID PANEL: CPT

## 2023-11-06 PROCEDURE — 80053 COMPREHEN METABOLIC PANEL: CPT

## 2023-11-06 PROCEDURE — 36415 COLL VENOUS BLD VENIPUNCTURE: CPT

## 2023-11-06 PROCEDURE — 3074F SYST BP LT 130 MM HG: CPT | Performed by: NURSE PRACTITIONER

## 2023-11-06 NOTE — ASSESSMENT & PLAN NOTE
Normal exam.  Labs as ordered. Skin check normal.  Hernial orifices intact. No cervical, inguinal, axillary lymphadenopathy. Rectal exam -Patient does not want this part of the exam.  PSA -ordered  Colonoscopy  Immunizations- Flu shot today.  Pneumonia PCV 20 ordered             I

## 2023-11-06 NOTE — ASSESSMENT & PLAN NOTE
Hx of hypertension   Blood pressure 128/86, pulse 73, height 5' 11\" (1.803 m), weight 292 lb (132.5 kg). Blood pressure has improved. He has given up red meat. Continue  - OLmedoxomil hydrochlorothiazide 1 tablet by mouth per day. -Amlodipine 10 mg daily  -Metoprolol  mg p.o. Daily.   Follow a low sodium diet

## 2023-11-07 LAB
DEPRECATED RDW RBC AUTO: 47.8 FL (ref 35.1–46.3)
ERYTHROCYTE [DISTWIDTH] IN BLOOD BY AUTOMATED COUNT: 18.5 % (ref 11–15)
HCT VFR BLD AUTO: 47.3 %
HGB BLD-MCNC: 14.6 G/DL
MCH RBC QN AUTO: 23.2 PG (ref 26–34)
MCHC RBC AUTO-ENTMCNC: 30.9 G/DL (ref 31–37)
MCV RBC AUTO: 75.2 FL
PLATELET # BLD AUTO: 416 10(3)UL (ref 150–450)
RBC # BLD AUTO: 6.29 X10(6)UL
WBC # BLD AUTO: 6.1 X10(3) UL (ref 4–11)

## 2023-11-08 RX ORDER — OLMESARTAN MEDOXOMIL AND HYDROCHLOROTHIAZIDE 40/25 40; 25 MG/1; MG/1
1 TABLET ORAL DAILY
Qty: 90 TABLET | Refills: 3 | Status: SHIPPED | OUTPATIENT
Start: 2023-11-08

## 2023-11-08 NOTE — TELEPHONE ENCOUNTER
Refill passed per [address] [facility] protocol.     Requested Prescriptions   Pending Prescriptions Disp Refills    OLMESARTAN MEDOXOMIL-HCTZ 40-25 MG Oral Tab [Pharmacy Med Name: Olmesartan Medoxomil-HCTZ 40-25 MG Oral Tablet] 90 tablet 3     Sig: TAKE 1 TABLET BY MOUTH IN THE  MORNING       Hypertensive Medications Protocol Passed - 11/7/2023 10:49 PM        Passed - In person appointment in the past 12 or next 3 months     Recent Outpatient Visits              2 days ago Essential hypertension    [facility], [address] [facility], Ainsley Conner, APRСВЕТЛАНА    Office Visit    3 months ago Fever, unspecified fever cause    [facility], [address] [facility], Ainsley Conner, APRСВЕТЛАНА    Office Visit    7 months ago Essential hypertension    Adam Leary Carmella Maxim, MD    Office Visit    1 year ago Routine physical examination    Adam Leary Carmella Maxim, MD    Office Visit    1 year ago Primary hypertension    [facility], [address], Ainsley Rodríguez, APRСВЕТЛАНА    Office Visit                      Passed - Last BP reading less than 140/90     BP Readings from Last 1 Encounters:   11/06/23 128/86               Passed - CMP or BMP in past 6 months     Recent Results (from the past 4392 hour(s))   Comp Metabolic Panel (14)    Collection Time: 11/06/23 11:34 AM   Result Value Ref Range    Glucose 91 70 - 99 mg/dL    Sodium 137 136 - 145 mmol/L    Potassium 3.9 3.5 - 5.1 mmol/L    Chloride 100 98 - 112 mmol/L    CO2 30.0 21.0 - 32.0 mmol/L    Anion Gap 7 0 - 18 mmol/L    BUN 8 (L) 9 - 23 mg/dL    Creatinine 0.96 0.70 - 1.30 mg/dL    BUN/CREA Ratio 8.3 (L) 10.0 - 20.0    Calcium, Total 9.8 8.7 - 10.4 mg/dL    Calculated Osmolality 282 275 - 295 mOsm/kg    eGFR-Cr 87 >=60 mL/min/1.73m2    ALT 14 10 - 49 U/L    AST 20 <=34 U/L    Alkaline Phosphatase 54 45 - 117 U/L    Bilirubin, Total 0.4 0.2 - 1.1 mg/dL    Total Protein 7.6 5.7 - 8.2 g/dL    Albumin 4.4 3.2 - 4.8 g/dL    Globulin  3.2 2.8 - 4.4 g/dL    A/G Ratio 1.4 1.0 - 2.0    Patient Fasting for CMP? Yes      *Note: Due to a large number of results and/or encounters for the requested time period, some results have not been displayed. A complete set of results can be found in Results Review.                Passed - In person appointment or virtual visit in the past 6 months     Recent Outpatient Visits              2 days ago Essential hypertension    [facility] [place], A[facility]a [facility], APRСВЕТЛАНА    Office Visit    3 months ago Fever, unspecified fever cause    [facility], DaniloEmigdio Cade, APRСВЕТЛАНА    Office Visit    7 months ago Essential hypertension    Emigdio Floyd MD    Office Visit    1 year ago Routine physical examination    Emigdio Floyd MD    Office Visit    1 year ago Primary hypertension    [facility], [address] [place] [address],[address], [facility], APRN    Office Visit                      Passed - EGFRCR or GFRAA > 50     GFR Evaluation  EGFRCR: 87 , resulted on 11/6/2023               Recent Outpatient Visits              2 days ago Essential hypertension    [facility], Emigdio Erazo Brendon Abide, APRN    Office Visit    3 months ago Fever, unspecified fever cause    [facility], DaniloEmigdio Cade, APRСВЕТЛАНА    Office Visit    7 months ago Essential hypertension    Emigdio Floyd MD    Office Visit    1 year ago Routine physical examination    Emigdio Floyd MD    Office Visit    1 year ago Primary hypertension    [facility], [address], [place], Alisa Reeves, APRN    Office Visit

## 2023-11-12 DIAGNOSIS — R79.89 ABNORMAL CBC: Primary | ICD-10-CM

## 2023-11-12 NOTE — TELEPHONE ENCOUNTER
[place] forms received and logged for processing. Auth on file . [employer] message sent to pt.

## 2023-11-14 ENCOUNTER — TELEPHONE (OUTPATIENT)
Dept: INTERNAL MEDICINE CLINIC | Facility: CLINIC | Age: 68
End: 2023-11-14

## 2023-11-15 NOTE — TELEPHONE ENCOUNTER
Patient contacted (name and date of birth verified). Provider's results and recommendations reviewed with patient. Patient verbalizes understanding of the information, agrees with plan of care and offers no further questions at this time.

## 2023-11-15 NOTE — TELEPHONE ENCOUNTER
Please follow up with patient and makes sure he received the results ofhis blood work. Have him follow up with Dr. Que Thomas. To review his CBC.     No Rodas DNP  Working with Omkar Meyer

## 2023-11-15 NOTE — TELEPHONE ENCOUNTER
Returned pt call, pt wanted to know where he can submit his TAMERA/FCR. Informed pt he can simply reply to [employer] giving us auth. Pt verbalized understanding. Pt stts forms are recert all will remain the same. Pt stts start date on forms are 11/6/23.

## 2023-11-16 NOTE — TELEPHONE ENCOUNTER
Jorge Mcgrath,     *The ACKNOWLEDGE button has been moved to the top right ribbon*    Please sign off on form if you agree to: Recert Vasquez due to ashtma. Start date 11/06/23-11/06/24  (place your signature on the first page only)    -From your Yossi Grvernon, Highlight the patient and click Chart   -Double click [id number] Forms Completion telephone encounter  -Scroll down to the Media section   -Click the blue Hyperlink: Vasquez Be 89/69/61   -Click Acknowledge located in the top right ribbon/menu   -Drag the mouse into the blank space of the document and a + sign will appear. Left click to   electronically sign the document.      Thank you,    Ayala Ruiz

## 2023-11-20 ENCOUNTER — NURSE ONLY (OUTPATIENT)
Dept: HEMATOLOGY/ONCOLOGY | Facility: HOSPITAL | Age: 68
End: 2023-11-20
Attending: SPECIALIST
Payer: COMMERCIAL

## 2023-11-20 VITALS
BODY MASS INDEX: 41.02 KG/M2 | TEMPERATURE: 98 F | DIASTOLIC BLOOD PRESSURE: 87 MMHG | HEART RATE: 79 BPM | RESPIRATION RATE: 18 BRPM | WEIGHT: 293 LBS | HEIGHT: 71 IN | SYSTOLIC BLOOD PRESSURE: 154 MMHG | OXYGEN SATURATION: 98 %

## 2023-11-20 DIAGNOSIS — E61.1 DIETARY IRON DEFICIENCY WITHOUT ANEMIA: ICD-10-CM

## 2023-11-20 DIAGNOSIS — R71.8 MICROCYTOSIS: Primary | ICD-10-CM

## 2023-11-20 LAB
IRON SATN MFR SERPL: 10 %
IRON SERPL-MCNC: 39 UG/DL
TIBC SERPL-MCNC: 374 UG/DL (ref 250–425)
TRANSFERRIN SERPL-MCNC: 251 MG/DL (ref 215–365)

## 2023-11-20 PROCEDURE — 99245 OFF/OP CONSLTJ NEW/EST HI 55: CPT | Performed by: SPECIALIST

## 2023-11-20 PROCEDURE — 36415 COLL VENOUS BLD VENIPUNCTURE: CPT

## 2023-11-20 NOTE — TELEPHONE ENCOUNTER
[employer] forms faxed to Ayaz Chen [phone] right fax confirmation received. [employer] message sent.

## 2023-11-21 ENCOUNTER — HOSPITAL ENCOUNTER (OUTPATIENT)
Age: 68
Discharge: HOME | End: 2023-11-21
Payer: COMMERCIAL

## 2023-11-21 ENCOUNTER — APPOINTMENT (OUTPATIENT)
Dept: GENERAL RADIOLOGY | Age: 68
End: 2023-11-21
Attending: PHYSICIAN ASSISTANT
Payer: COMMERCIAL

## 2023-11-21 VITALS
HEART RATE: 79 BPM | HEIGHT: 71 IN | DIASTOLIC BLOOD PRESSURE: 85 MMHG | RESPIRATION RATE: 20 BRPM | TEMPERATURE: 97 F | SYSTOLIC BLOOD PRESSURE: 145 MMHG | OXYGEN SATURATION: 100 % | BODY MASS INDEX: 40.6 KG/M2 | WEIGHT: 290 LBS

## 2023-11-21 DIAGNOSIS — J20.8 ACUTE VIRAL BRONCHITIS: Primary | ICD-10-CM

## 2023-11-21 LAB
POCT INFLUENZA A: NEGATIVE
POCT INFLUENZA B: NEGATIVE
SARS-COV-2 RNA RESP QL NAA+PROBE: NOT DETECTED

## 2023-11-21 PROCEDURE — 71046 X-RAY EXAM CHEST 2 VIEWS: CPT | Performed by: PHYSICIAN ASSISTANT

## 2023-11-21 PROCEDURE — 99214 OFFICE O/P EST MOD 30 MIN: CPT

## 2023-11-21 PROCEDURE — 87502 INFLUENZA DNA AMP PROBE: CPT | Performed by: PHYSICIAN ASSISTANT

## 2023-11-21 RX ORDER — BENZONATATE 200 MG/1
200 CAPSULE ORAL 3 TIMES DAILY PRN
Qty: 30 CAPSULE | Refills: 0 | Status: SHIPPED | OUTPATIENT
Start: 2023-11-21 | End: 2023-11-28

## 2023-11-21 RX ORDER — ALBUTEROL SULFATE 90 UG/1
2 AEROSOL, METERED RESPIRATORY (INHALATION) EVERY 4 HOURS PRN
Qty: 1 EACH | Refills: 0 | Status: SHIPPED | OUTPATIENT
Start: 2023-11-21 | End: 2023-12-21

## 2023-11-22 NOTE — DISCHARGE INSTRUCTIONS
Albuterol inhaler as needed every 4-6 hours    Tessalon Perles as needed for cough up to 3 times a day    Push fluids rest, if fever chills be reevaluated

## 2023-11-24 ENCOUNTER — TELEPHONE (OUTPATIENT)
Facility: CLINIC | Age: 68
End: 2023-11-24

## 2023-11-24 NOTE — TELEPHONE ENCOUNTER
GI RNs: Please reach out to the patient. He recently saw hematology for a low MCV and was found to be iron deficient. Can we arrange a nonurgent office follow-up in the next 6 weeks.

## 2023-11-27 NOTE — TELEPHONE ENCOUNTER
Called and spoke to the patient, /name verified. Your Appointments      2024  4:20 PM  Follow Up Visit with Huong Abraham MD  [facility], [address] [place] [address],[address], [address]  [phone]        He stated he will recheck his appt schedule in the [address]

## 2023-12-13 ENCOUNTER — TELEPHONE (OUTPATIENT)
Facility: CLINIC | Age: 68
End: 2023-12-13

## 2023-12-13 NOTE — TELEPHONE ENCOUNTER
Sayra Ang MD  [facility] Gi Clinical Staff; Irwin Salinas MD         Previous Messages    Routed Note    Author: Sayra Ang MD Service: -- Author Type: Physician   Filed: 12/8/2023  7:12 PM Encounter Date: 11/20/2023 Status: Signed   : Sayra Ang MD (Physician)     GI RNs: I received a note from the patient's hematologist Dr. Maritza Sarah. The patient has a mild iron deficiency. I have not seen him since his colonoscopy in 2019. Can we arrange a follow-up appointment in the office in the next several weeks to discuss. Thank you. Patient has an office visit. Please see 11/24/23 encounter.     Your Appointments      Wednesday January 03, 2024  4:20 PM  Follow Up Visit with Huong Abraham MD  [facility], [address] [place] [address],[address], [place] [address] ([facility]) [address] [place] [address],[address]  [phone]

## 2023-12-27 ENCOUNTER — IMMUNIZATION (OUTPATIENT)
Dept: LAB | Age: 68
End: 2023-12-27
Attending: EMERGENCY MEDICINE
Payer: COMMERCIAL

## 2023-12-27 DIAGNOSIS — Z23 NEED FOR VACCINATION: Primary | ICD-10-CM

## 2023-12-27 PROCEDURE — 90480 ADMN SARSCOV2 VAC 1/ONLY CMP: CPT

## 2024-01-03 ENCOUNTER — OFFICE VISIT (OUTPATIENT)
Facility: CLINIC | Age: 69
End: 2024-01-03

## 2024-01-03 VITALS
BODY MASS INDEX: 40.6 KG/M2 | WEIGHT: 290 LBS | HEIGHT: 71 IN | DIASTOLIC BLOOD PRESSURE: 90 MMHG | SYSTOLIC BLOOD PRESSURE: 142 MMHG | HEART RATE: 70 BPM

## 2024-01-03 DIAGNOSIS — R79.0 ABNORMAL IRON SATURATION: ICD-10-CM

## 2024-01-03 DIAGNOSIS — K62.5 RECTAL BLEEDING: Primary | ICD-10-CM

## 2024-01-03 PROCEDURE — 3080F DIAST BP >= 90 MM HG: CPT | Performed by: INTERNAL MEDICINE

## 2024-01-03 PROCEDURE — 3077F SYST BP >= 140 MM HG: CPT | Performed by: INTERNAL MEDICINE

## 2024-01-03 PROCEDURE — 99243 OFF/OP CNSLTJ NEW/EST LOW 30: CPT | Performed by: INTERNAL MEDICINE

## 2024-01-03 PROCEDURE — 3008F BODY MASS INDEX DOCD: CPT | Performed by: INTERNAL MEDICINE

## 2024-01-03 RX ORDER — POLYETHYLENE GLYCOL 3350, SODIUM CHLORIDE, SODIUM BICARBONATE, POTASSIUM CHLORIDE 420; 11.2; 5.72; 1.48 G/4L; G/4L; G/4L; G/4L
4 POWDER, FOR SOLUTION ORAL ONCE
Qty: 4000 ML | Refills: 0 | Status: SHIPPED | OUTPATIENT
Start: 2024-01-03 | End: 2024-01-03

## 2024-01-03 NOTE — PATIENT INSTRUCTIONS
Schedule upper and lower endoscopy for iron deficiency, rectal bleeding and chronic aspirin use following a split dose TriLyte preparation and monitored anesthesia care.

## 2024-01-04 ENCOUNTER — TELEPHONE (OUTPATIENT)
Facility: CLINIC | Age: 69
End: 2024-01-04

## 2024-01-04 DIAGNOSIS — D50.9 IRON DEFICIENCY ANEMIA, UNSPECIFIED IRON DEFICIENCY ANEMIA TYPE: Primary | ICD-10-CM

## 2024-01-04 DIAGNOSIS — K62.5 RECTAL BLEEDING: ICD-10-CM

## 2024-01-04 NOTE — PROGRESS NOTES
Subjective:   Patient ID: David Koch is a 68 year old male.    HPI  The patient returns in follow-up today after recent hematology evaluation and recommendation by Dr. Redd.  He was last seen by myself at colonoscopy in October 2019.    As per previous notes the patient has a longstanding history of rectal bleeding.  An initial colonoscopy in June 2014 revealed #1-2 subcentimeter adenomatous polyps and internal hemorrhoids.  A surveillance colonoscopy in October 2019 revealed no metachronous polyps.  Internal hemorrhoids identified.  Routine screening was advised.    The patient has a history of chronic microcytosis dating back to at least October 2011 (Care Everywhere) with an elevated RBC count.  Recent hematology evaluation and DNA testing revealed alpha thalassemia trait.  In the course of that workup, however, an iron saturation was performed and found to be low at 10%.  Follow-up with gastroenterology was advised.    The patient continues to experience rectal bleeding once or twice weekly for at least the past year.  This occurs when passing a large, painful stool.  He provides a cell phone photograph documenting fresh red blood in the toilet.    The patient is otherwise well.  His appetite is \"normal\".  He has discontinued eating beef for 5 years.  He eats chicken and occasionally fish and xavier.  He denies dysphagia.  He has rare heartburn.  He has rare abdominal pain.  Constipation ensues with decreased fiber intake.  He has noted no melena.    Of note the patient takes a full dose 325 mg of aspirin daily for the past 20 years.  No NSAIDs.    The patient has a history of obstructive sleep apnea but is unable to tolerate CPAP.    History/Other:   Review of Systems  See above    Wt Readings from Last 6 Encounters:   01/03/24 290 lb (131.5 kg)   11/21/23 290 lb (131.5 kg)   11/20/23 293 lb (132.9 kg)   11/06/23 292 lb (132.5 kg)   08/10/23 299 lb (135.6 kg)   03/17/23 (!) 305 lb 9.6 oz (138.6 kg)        Current Outpatient Medications   Medication Sig Dispense Refill    Olmesartan Medoxomil-HCTZ 40-25 MG Oral Tab Take 1 tablet by mouth daily. 90 tablet 3    amLODIPine 10 MG Oral Tab Take 1 tablet (10 mg total) by mouth daily. 90 tablet 3    Sildenafil Citrate 50 MG Oral Tab Take 1 tablet (50 mg total) by mouth daily as needed for Erectile Dysfunction. 18 tablet 1    metoprolol succinate  MG Oral Tablet 24 Hr Take 1 tablet (100 mg total) by mouth daily. 90 tablet 3    naproxen 500 MG Oral Tab Take 1 tablet (500 mg total) by mouth 2 (two) times daily as needed. 15 tablet 0    Fluticasone Propionate HFA (FLOVENT HFA) 110 MCG/ACT Inhalation Aerosol Inhale 2 puffs into the lungs 2 (two) times daily. 1 each 0    Albuterol Sulfate HFA (PROVENTIL HFA) 108 (90 Base) MCG/ACT Inhalation Aero Soln Inhale 2 puffs into the lungs every 4 (four) hours as needed. 1 each 2    SUMAtriptan Succinate (IMITREX) 25 MG Oral Tab Take 1 tablet (25 mg total) by mouth every 2 (two) hours as needed for Migraine. Use at onset; repeat once after 2 HRS-ONLY 2 IN 24 HR MAX 9 tablet 1    Dorzolamide HCl 2 % Ophthalmic Solution       brimonidine Tartrate 0.2 % Ophthalmic Solution INSTILL 1 DROP TWO TIMES A DAY IN RIGHT EYE  6    latanoprost 0.005 % Ophthalmic Solution Place 1 drop into both eyes nightly.      Flaxseed, Linseed, (FLAXSEED OIL) 1000 MG Oral Cap Take  by mouth.      Multiple Vitamin (MULTIVITAMINS) Oral Cap Take 1 capsule by mouth.      aspirin 325 MG Oral Tab Take 1 tablet (325 mg total) by mouth daily.      Fish Oil-Cholecalciferol (FISH OIL + D3 OR) Take 1 capsule by mouth daily.      COD LIVER OIL OR Take 1 capsule by mouth daily.       Allergies:  Allergies   Allergen Reactions    Clemizole OTHER (SEE COMMENTS)    Penicillins DIZZINESS     Other reaction(s): Dizziness       Objective:   Physical Exam  Vitals and nursing note reviewed.   Constitutional:       General: He is not in acute distress.     Appearance: He is  well-developed. He is not ill-appearing or diaphoretic.   HENT:      Mouth/Throat:      Pharynx: No oropharyngeal exudate.   Eyes:      General: No scleral icterus.     Conjunctiva/sclera: Conjunctivae normal.   Neck:      Thyroid: No thyromegaly.   Cardiovascular:      Rate and Rhythm: Normal rate and regular rhythm.      Heart sounds: Normal heart sounds. No murmur heard.  Pulmonary:      Effort: Pulmonary effort is normal. No respiratory distress.      Breath sounds: Normal breath sounds. No wheezing or rales.   Abdominal:      General: Bowel sounds are normal. There is no distension.      Palpations: Abdomen is soft. There is no mass.      Tenderness: There is no abdominal tenderness. There is no guarding or rebound.   Musculoskeletal:      Cervical back: Neck supple.   Lymphadenopathy:      Cervical: No cervical adenopathy.   Neurological:      Mental Status: He is alert and oriented to person, place, and time.   Psychiatric:         Behavior: Behavior normal.     Component      Latest Ref Rng 6/17/2019 7/28/2020 4/12/2021 6/12/2021 8/25/2021 9/16/2022   WBC      4.0 - 11.0 x10(3) uL 7.1  6.7  8.0  7.0  8.6  7.6    RBC      3.80 - 5.80 x10(6)uL 6.46 (H)  6.10 (H)  6.32 (H)  6.26 (H)  5.91 (H)  6.27 (H)    Hemoglobin      13.0 - 17.5 g/dL 15.3  14.7  14.9  14.8  14.1  14.8    Hematocrit      39.0 - 53.0 % 49.3  45.8  47.6  47.7  44.4  47.8    Platelet Count      150.0 - 450.0 10(3)uL 368.0  332.0  343.0  341.0  356.0  395.0    MCV      80.0 - 100.0 fL 76.3 (L)  75.1 (L)  75.3 (L)  76.2 (L)  75.1 (L)  76.2 (L)    MCH      26.0 - 34.0 pg 23.7 (L)  24.1 (L)  23.6 (L)  23.6 (L)  23.9 (L)  23.6 (L)    MCHC      31.0 - 37.0 g/dL 31.0  32.1  31.3  31.0  31.8  31.0    RDW      11.0 - 15.0 % 18.9 (H)  18.6 (H)  18.5 (H)  19.1 (H)  17.2 (H)  18.6 (H)    RDW-SD      35.1 - 46.3 fL 48.7 (H)  46.5 (H)  46.1  47.9 (H)  45.1  48.6 (H)      Component      Latest Ref Rn 11/6/2023   WBC      4.0 - 11.0 x10(3) uL 6.1    RBC       3.80 - 5.80 x10(6)uL 6.29 (H)    Hemoglobin      13.0 - 17.5 g/dL 14.6    Hematocrit      39.0 - 53.0 % 47.3    Platelet Count      150.0 - 450.0 10(3)uL 416.0    MCV      80.0 - 100.0 fL 75.2 (L)    MCH      26.0 - 34.0 pg 23.2 (L)    MCHC      31.0 - 37.0 g/dL 30.9 (L)    RDW      11.0 - 15.0 % 18.5 (H)    RDW-SD      35.1 - 46.3 fL 47.8 (H)       Legend:  (H) High  (L) Low    Collected 11/20/2023  2:33 PM       Status: Final result       Dx: Microcytosis    0 Result Notes      Component  Ref Range & Units 11/20/23  2:33 PM   Iron  65 - 175 ug/dL 39 Low    Transferrin  215 - 365 mg/dL 251   Total Iron Binding Capacity  250 - 425 ug/dL 374   % Saturation  20 - 50 % 10 Low    Resulting Agency [facility] ([facility] access hospital)           Media Information  Status   Received [10]     File Link    Lab Results - Scan on 12/15/2023 5:29 AM        Key Information    Document ID File Type Document Type Description   [id number] Image Lab Results      Import Information    Attached At Date Time User Dept   Order Level 11/20/2023 12:00 AM       Order    Lab Result Scan [[id number]]     Encounter    Office Visit on 11/20/23 with Wolfgang Redd MD     Media Information         Document Information    Lab Results      12/15/2023 5:29 AM   Attached To:   LAB RESULT SCAN [[id number]]   Office Visit on 11/20/23 with Wolfgang Redd MD     Source Information    Interface, Emg Tx In                  Specimen Collected: 11/20/23  2:33 PM Last Resulted: 11/20/23  3:33 PM             Component  Ref Range & Units 12 yr ago   WHITE BLOOD COUNT (L)  4.0 - 11.0 K/UL 5.6   RED BLOOD CELL COUNT  4.50 - 5.90 M/UL 6.13 High    HEMOGLOBIN (L)  13.5 - 17.5 G/DL 15.2   HEMATOCRIT (L)  41.0 - 52.0 % 46.7   MEAN CORPUSCULAR VOLUME  80.0 - 100.0 FL 76.2 Low    MEAN CORPUSCULAR HEMOGLOBIN  27.0 - 32.0 PG 24.8 Low    MEAN CORPUSCULAR HEMOGLOBIN CO  32.0 - 37.0 G/DL 32.5   RED CELL DISTRIBUTION WIDTH  11.0 - 15.0 % 18 High    PLATELET COUNT (L)  140 - 400  K/   MEAN PLATELET VOLUME  7.4 - 10.3 FL 8.6   NEUTROPHIL %  40 - 76 % 45   LYMPHOCYTE %  25 - 46 % 45   MONOCYTE %  3 - 16 % 6   EOSINOPHIL %  0 - 7 % 3   BASOPHIL %  0 - 2 % 1   NEUTROPHILS #  1.8 - 7.7 K/UL 2.5   LYMPHOCYTES #  1.0 - 4.0 K/UL 2.5   MONOCYTES #  0.0 - 1.0 K/UL 0.4   EOSINOPHILS #  0.0 - 0.7 K/UL 0.2   BASOPHILS #  0.0 - 0.2 K/UL 0   Resulting Agency [facility]   Specimen Collected: 10/10/11  1:27 PM Last Resulted: 10/10/11  3:39 PM   Received From: [facility]  Result Received: 08/03/22 11:59 A       [facility] Endoscopy Report        Date of Procedure:  10/02/19        Preoperative Diagnosis:  1.  Personal history of adenomatous colon polyps  2.  Colorectal cancer screening  3.  Anorectal bleeding        Postoperative Diagnosis:  Internal hemorrhoids        Procedure:    Colonoscopy         Surgeon:  Jaquan Dixon M.D.        Anesthesia:  Monitored anesthesia care  Cecal withdrawal time: 21 minutes         Brief History:  This is a 64 year old male who presents for a surveillance/screening colonoscopy in the setting of a history of #1-2 subcentimeter adenomatous colon polyps removed endoscopically 5 years prior.  The patient has occasional constipation and anorectal bleeding but is otherwise asymptomatic from a lower gastrointestinal tract standpoint.        Technique:  After informed consent, the patient was placed in the left lateral recumbent position.  Digital rectal examination revealed no palpable intraluminal abnormalities.  An Olympus variable stiffness 190 series HD colonoscope was inserted into the rectum and advanced under direct vision by following the lumen to the terminal ileum.  The colon was examined upon withdrawal in the left lateral recumbent position.       Findings:  The preparation of the colon was very good.  There was no blood within the colonic lumen.  A very brief glimpse of the terminal ileum was normal.  The ileocecal valve was  somewhat prominent but well preserved. The visualized colonic mucosa from the cecum to the anal verge was normal with an intact vascular pattern.  There were no colonic polyps, definite diverticula, mass lesions, vascular anomalies or signs of inflammation seen.  Retroflexion in the rectum and pull-through revealed prominent internal hemorrhoids with overlying red aide markings.  The procedure was well tolerated without immediate complication.        Impression:  1.  Internal hemorrhoids with stigmata of bleeding  2.  Otherwise normal colonoscopy to the terminal ileum     Recommendations:  1.  High-fiber diet and conservative measures for internal hemorrhoids.  2.  Screening colonoscopy interval can likely be increased back to 10 years based on only 1-2 adenomas previously and no adenomas on today's examination.           Jaquan Dixon MD  10/2/2019                  Electronically signed by Jaquan Dixon MD at 10/2/2019  9:52 AM  Assessment & Plan:   1. Rectal bleeding    2. Abnormal iron saturation    The patient has a longstanding history of rectal bleeding presumed to be hemorrhoidal.  He also has chronic microcytosis with an elevated RBC count secondary to alpha thalassemia trait.  Iron studies revealed a low iron saturation consistent with iron deficiency (but no anemia).  We discussed that the low iron levels may be due to chronic gastrointestinal blood loss from the patient's rectal bleeding.  He also takes chronic full dose aspirin therapy and an aspirin induced gastroenteropathy cannot be excluded.  We also discussed more ominous causes such as neoplastic disease.  As it has been 3 years since the patient's last colonoscopy, I am recommending that we repeat a colonoscopy along with an upper endoscopy( the latter in light of the iron deficiency and aspirin use).  The procedures will be performed with a split dose PEG lavage and monitored anesthesia care.  We discussed iron replacement which  could be oral (may worsen constipation) or if needed parenterally under the direction of Dr. Redd.        Meds This Visit:  Requested Prescriptions     Signed Prescriptions Disp Refills    PEG 3350-KCl-Na Bicarb-NaCl (TRILYTE) 420 g Oral Recon Soln 4000 mL 0     Sig: Take 4,000 mL (4 L total) by mouth one time for 1 dose.       Imaging & Referrals:  None

## 2024-01-04 NOTE — TELEPHONE ENCOUNTER
Scheduled for:  Colonoscopy 65843, 63150, EGD 20975  Provider Name:    Date:  3/15/2024  Location:  [facility]  Sedation:  MAC  Time:  1:45 pm, (pt is aware that [facility] will call the day before to confirm arrival time)  Prep:  Trilyte  Meds/Allergies Reconciled?: Physician reviewed   Diagnosis with codes: Iron Deficiency D50.9, Rectal Bleeding K62.5   Was patient informed to call insurance with codes (Y/N): Yes    Referral sent?: Referral was sent at the time of electronic surgical scheduling.   EM or [facility] notified?:   Electronic case request was sent to [facility] via [email or web address].  Medication Orders: Pt is aware to NOT take iron pills, herbal meds and diet supplements for 7 days before exam. Also to NOT take any form of alcohol, recreational drugs and any forms of ED meds 24 hours before exam.    Misc Orders: N/A      Further instructions given by staff: I discussed the prep instructions with the patient which he verbally understood. Provided patient with prep instructions and cancellation policy at the time of office visit.

## 2024-02-02 NOTE — TELEPHONE ENCOUNTER
Please review; protocol failed/No Protocol  Requested Prescriptions   Pending Prescriptions Disp Refills    METOPROLOL SUCCINATE  MG Oral Tablet 24 Hr [Pharmacy Med Name: Metoprolol Succinate  MG Oral Tablet Extended Release 24 Hour] 90 tablet 3     Sig: TAKE 1 TABLET BY MOUTH DAILY       Hypertensive Medications Protocol Failed - 2/1/2024 10:25 PM        Failed - Last BP reading less than 140/90     BP Readings from Last 1 Encounters:   01/03/24 142/90               Passed - In person appointment in the past 12 or next 3 months     Recent Outpatient Visits              1 month ago Rectal bleeding    [facility] Jaquan Dixon MD    Office Visit    2 months ago     [facility]cology    Nurse Only    2 months ago Microcytosis    [facility] Wolfgang Redd MD    Office Visit    2 months ago Essential hypertension    [facility] Yarelis Castellanos APRN    Office Visit    5 months ago Fever, unspecified fever cause    [facility] Yarelis Castellanos APRN    Office Visit          Future Appointments         Provider Department Appt Notes    In 1 month HERI, PROCEDURE [facility] Colon/EGD w/MAC @[facility]               Passed - CMP or BMP in past 6 months     Recent Results (from the past 4392 hour(s))   Comp Metabolic Panel (14)    Collection Time: 11/06/23 11:34 AM   Result Value Ref Range    Glucose 91 70 - 99 mg/dL    Sodium 137 136 - 145 mmol/L    Potassium 3.9 3.5 - 5.1 mmol/L    Chloride 100 98 - 112 mmol/L    CO2 30.0 21.0 - 32.0 mmol/L    Anion Gap 7 0 - 18 mmol/L    BUN 8 (L) 9 - 23 mg/dL    Creatinine 0.96 0.70 - 1.30 mg/dL    BUN/CREA Ratio 8.3 (L) 10.0 - 20.0    Calcium, Total 9.8 8.7 - 10.4 mg/dL    Calculated Osmolality 282 275 - 295 mOsm/kg    eGFR-Cr 87 >=60 mL/min/1.73m2     ALT 14 10 - 49 U/L    AST 20 <=34 U/L    Alkaline Phosphatase 54 45 - 117 U/L    Bilirubin, Total 0.4 0.2 - 1.1 mg/dL    Total Protein 7.6 5.7 - 8.2 g/dL    Albumin 4.4 3.2 - 4.8 g/dL    Globulin  3.2 2.8 - 4.4 g/dL    A/G Ratio 1.4 1.0 - 2.0    Patient Fasting for CMP? Yes      *Note: Due to a large number of results and/or encounters for the requested time period, some results have not been displayed. A complete set of results can be found in Results Review.               Passed - In person appointment or virtual visit in the past 6 months     Recent Outpatient Visits              [facility]    [facility]    2 months ago     [facility]    Nurse Only    2 months ago Microcytosis    [facility] Wolfgang Redd MD    Office Visit    2 months ago [facility]    [facility] Visit    5 months ago Fever, unspecified fever cause    [facility] Yarelis Castellanos APRN    Office Visit          Future Appointments         Provider Department [facility]t Notes    [facility]               Passed - EGFRCR or GFRAA > 50     GFR Evaluation  EGFRCR: 87 , resulted on 11/6/2023             Future Appointments         Provider Department Appt Notes    [facility] w[facility]          Recent Outpatient Visits              1 month ago Rectal bleeding    [facility]    Office Visit    2 months ago     [facility]    Nurse Only    2 months ago Microcytosis    [facility] Hematology  Oncology Unddenzelia, Wolfgang Puckett MD    Office Visit    2 months ago Essential hypertension    [facility], Yarelis Rand APRN    Office Visit    5 months ago Fever, unspecified fever cause    [facility], Yarelis Rand APRN    Office Visit

## 2024-02-03 RX ORDER — METOPROLOL SUCCINATE 100 MG/1
100 TABLET, EXTENDED RELEASE ORAL DAILY
Qty: 90 TABLET | Refills: 3 | Status: SHIPPED | OUTPATIENT
Start: 2024-02-03

## 2024-03-04 ENCOUNTER — PATIENT MESSAGE (OUTPATIENT)
Facility: CLINIC | Age: 69
End: 2024-03-04

## 2024-03-05 ENCOUNTER — TELEPHONE (OUTPATIENT)
Facility: CLINIC | Age: 69
End: 2024-03-05

## 2024-03-05 NOTE — TELEPHONE ENCOUNTER
From: David Koch  To: Jaquan Dixon  Sent: 3/4/2024 8:39 PM CST  Subject: Colonscopy    I haven't received any colon prep information regarding my upcoming appointment on 3/14/24.

## 2024-03-05 NOTE — TELEPHONE ENCOUNTER
GI MAs    Please see message below.    Thanks      From: David Koch  To: Jaquan Dixon  Sent: 3/4/2024  8:39 PM CST  Subject: Colonscopy    I haven't received any colon  prep information regarding my upcoming appointment on 3/14/24.

## 2024-03-08 NOTE — TELEPHONE ENCOUNTER
3rd Attempt:     LVM - prep instructions were went via [employer]. Call back with any further questions.

## 2024-03-14 RX ORDER — AMLODIPINE BESYLATE 10 MG/1
10 TABLET ORAL DAILY
Qty: 90 TABLET | Refills: 1 | Status: SHIPPED | OUTPATIENT
Start: 2024-03-14

## 2024-03-15 PROCEDURE — 88312 SPECIAL STAINS GROUP 1: CPT | Performed by: INTERNAL MEDICINE

## 2024-03-15 PROCEDURE — 88305 TISSUE EXAM BY PATHOLOGIST: CPT | Performed by: INTERNAL MEDICINE

## 2024-03-15 NOTE — TELEPHONE ENCOUNTER
Please review. Protocol failed / No Protocol.    Requested Prescriptions   Pending Prescriptions Disp Refills    AMLODIPINE 10 MG Oral Tab [Pharmacy Med Name: amLODIPine Besylate 10 MG Oral Tablet] 90 tablet 3     Sig: TAKE 1 TABLET BY MOUTH DAILY       Hypertension Medications Protocol Failed - 3/14/2024  4:12 AM        Failed - Last BP reading less than 140/90     BP Readings from Last 1 Encounters:   01/03/24 142/90               Passed - CMP or BMP in past 12 months        Passed - In person appointment or virtual visit in the past 12 mos or appointment in next 3 mos     Recent Outpatient Visits              2 months ago Rectal bleeding    [facility] Jaquan Dixon MD    Office Visit    3 months ago     [facility] Oncology    Nurse Only    3 months ago Microcytosis    [facility] Oncology UndeviaWolfgang MD    Office Visit    4 months ago [facility]ential hypertension    [facility]    Office Visit    7 months ago Fever, unspecified fever cause    [facility] Yarelis Castellanos APRN    Office Visit          Future Appointments         Provider Department Appt Notes    Tomorrow LEANNE DIXON [facility] Colon/EGD w/MAC @[facility]               Passed - EGFRCR or GFRAA > 50     GFR Evaluation  EGFRCR: 87 , resulted on 11/6/2023

## 2024-03-19 DIAGNOSIS — A04.8 H. PYLORI INFECTION: Primary | ICD-10-CM

## 2024-03-19 RX ORDER — METRONIDAZOLE 500 MG/1
500 TABLET ORAL EVERY 8 HOURS
Qty: 42 TABLET | Refills: 0 | Status: SHIPPED | OUTPATIENT
Start: 2024-03-19

## 2024-03-19 RX ORDER — CLARITHROMYCIN 500 MG/1
500 TABLET, COATED ORAL 2 TIMES DAILY
Qty: 28 TABLET | Refills: 0 | Status: SHIPPED | OUTPATIENT
Start: 2024-03-19 | End: 2024-04-02

## 2024-03-19 RX ORDER — PANTOPRAZOLE SODIUM 40 MG/1
40 TABLET, DELAYED RELEASE ORAL 2 TIMES DAILY
Qty: 28 TABLET | Refills: 0 | Status: SHIPPED | OUTPATIENT
Start: 2024-03-19

## 2024-03-21 ENCOUNTER — TELEPHONE (OUTPATIENT)
Facility: CLINIC | Age: 69
End: 2024-03-21

## 2024-03-21 RX ORDER — ASPIRIN 81 MG/1
81 TABLET, CHEWABLE ORAL ONCE
Status: CANCELLED | COMMUNITY
Start: 2024-03-21 | End: 2024-03-21

## 2024-03-21 RX ORDER — ASPIRIN 81 MG/1
81 TABLET ORAL DAILY
COMMUNITY
Start: 2024-03-21

## 2024-03-21 NOTE — TELEPHONE ENCOUNTER
Health maintenance updated.  5 year colonoscopy recall placed in patient outreach.Next due on 03/15/2029 per Dr. Dixon.    6 week lab recall placed in other patient outreach.       Message below sent to Dr. Husain.

## 2024-03-21 NOTE — TELEPHONE ENCOUNTER
Spoke with the patient,verified full name and     Informed him of message  he is currently taking 325 mg and will decrease to 81 mg    No further questions

## 2024-03-21 NOTE — TELEPHONE ENCOUNTER
----- Message from Jaquan Dixon MD sent at 3/19/2024  7:26 PM CDT -----  I spoke to the patient.  He is feeling well.  He has solitary subcentimeter sessile serrated adenoma removed.  I discussed the significance.  Uncomplicated diverticulosis was present along with internal hemorrhoids with stigmata of bleeding.  I have recommended a surveillance colonoscopy in 5 years.  We also discussed the H. pylori gastritis with antral erosions in the setting of full dose aspirin use.  The patient is penicillin allergic.  I have recommended the followin.  Begin a 2-week course of clarithromycin, metronidazole, full dose PPI for 2 weeks.  The patient was advised not to drink alcohol during treatment.  2.  H. pylori breath test 6-8 weeks after treatment has been completed.  3.  Clarify with Dr. Husain if the patient requires full dose aspirin therapy.    GI RNs: Please enter colonoscopy recall for 5 years.  Shoaib, does the patient require full dose aspirin therapy.  Please let him know.

## 2024-03-21 NOTE — TELEPHONE ENCOUNTER
Please call the patient.  Advise him that I have discussed his endoscopy findings with the GI doctor.  We should consider decreasing his dose of aspirin.  His chart shows that he is taking 325 mg of aspirin a day.  If he is indeed taking that much, then he should go down to 81 mg of aspirin a day.  This will be a strong enough dose but will also not bother his stomach.  If he is not taking an aspirin a day then I do not think he should start it at this time.

## 2024-04-22 ENCOUNTER — TELEPHONE (OUTPATIENT)
Dept: INTERNAL MEDICINE CLINIC | Facility: CLINIC | Age: 69
End: 2024-04-22

## 2024-05-09 ENCOUNTER — TELEPHONE (OUTPATIENT)
Dept: GASTROENTEROLOGY | Facility: CLINIC | Age: 69
End: 2024-05-09

## 2024-05-13 NOTE — TELEPHONE ENCOUNTER
RN received transferred call from [facility]. RN spoke to pt, informed him he is due for h pylori testing. Discussed that pt should not have any ppi, pepto bismol, or abx for 2 weeks before test done. Discussed being NPO (and no smoking, chewing gum) for 1 hour before test. Pt confirmed that he completed h pylori treatment on or around April 3.  Provided lab hours. Pt verbalized understanding and had no further needs.

## 2024-05-20 ENCOUNTER — LAB ENCOUNTER (OUTPATIENT)
Dept: LAB | Facility: HOSPITAL | Age: 69
End: 2024-05-20
Attending: INTERNAL MEDICINE

## 2024-05-20 DIAGNOSIS — A04.8 H. PYLORI INFECTION: ICD-10-CM

## 2024-05-20 PROCEDURE — 83013 H PYLORI (C-13) BREATH: CPT

## 2024-05-22 LAB — H PYLORI BREATH TEST: NEGATIVE

## 2024-06-25 ENCOUNTER — OFFICE VISIT (OUTPATIENT)
Dept: INTERNAL MEDICINE CLINIC | Facility: CLINIC | Age: 69
End: 2024-06-25

## 2024-06-25 VITALS
WEIGHT: 292 LBS | HEART RATE: 73 BPM | BODY MASS INDEX: 40.88 KG/M2 | OXYGEN SATURATION: 99 % | DIASTOLIC BLOOD PRESSURE: 89 MMHG | SYSTOLIC BLOOD PRESSURE: 139 MMHG | TEMPERATURE: 98 F | RESPIRATION RATE: 16 BRPM | HEIGHT: 71 IN

## 2024-06-25 DIAGNOSIS — I10 ESSENTIAL HYPERTENSION: ICD-10-CM

## 2024-06-25 DIAGNOSIS — J45.21 MILD INTERMITTENT ASTHMA WITH ACUTE EXACERBATION (HCC): Primary | ICD-10-CM

## 2024-06-25 PROCEDURE — 3075F SYST BP GE 130 - 139MM HG: CPT | Performed by: NURSE PRACTITIONER

## 2024-06-25 PROCEDURE — 3079F DIAST BP 80-89 MM HG: CPT | Performed by: NURSE PRACTITIONER

## 2024-06-25 PROCEDURE — 3008F BODY MASS INDEX DOCD: CPT | Performed by: NURSE PRACTITIONER

## 2024-06-25 PROCEDURE — 99214 OFFICE O/P EST MOD 30 MIN: CPT | Performed by: NURSE PRACTITIONER

## 2024-06-25 RX ORDER — FLUTICASONE PROPIONATE 50 MCG
2 SPRAY, SUSPENSION (ML) NASAL DAILY
Qty: 11.1 ML | Refills: 0 | Status: SHIPPED | OUTPATIENT
Start: 2024-06-25 | End: 2024-06-25

## 2024-06-25 RX ORDER — SILDENAFIL 50 MG/1
50 TABLET, FILM COATED ORAL DAILY PRN
Qty: 18 TABLET | Refills: 1 | Status: SHIPPED | OUTPATIENT
Start: 2024-06-25

## 2024-06-25 RX ORDER — AZITHROMYCIN 250 MG/1
TABLET, FILM COATED ORAL
Qty: 6 TABLET | Refills: 0 | Status: SHIPPED | OUTPATIENT
Start: 2024-06-25 | End: 2024-06-29

## 2024-06-25 RX ORDER — FLUTICASONE PROPIONATE 50 MCG
2 SPRAY, SUSPENSION (ML) NASAL DAILY
Qty: 11.1 ML | Refills: 2 | Status: SHIPPED | OUTPATIENT
Start: 2024-06-25 | End: 2025-06-20

## 2024-06-25 NOTE — PROGRESS NOTES
David Koch is a 68 year old male.  Chief Complaint   Patient presents with    Cough     congestion     HPI:   He presents with cough, SOB with exertion, wheezing, dizziness, headaches, congestion, bilateral ear pressure and runny nose.     His wife was sick last week. His symptoms started last Thursday.     He denies any chest pain, SOB or fevers.   He has been using Flonase nasal spray, albuterol inhaler and Nyquil for his symptoms with little relief.     He has a history of asthma.     Current Outpatient Medications   Medication Sig Dispense Refill    azithromycin (ZITHROMAX Z-NISHA) 250 MG Oral Tab Take 2 tablets (500 mg total) by mouth daily for 1 day, THEN 1 tablet (250 mg total) daily for 4 days. 6 tablet 0    fluticasone propionate 50 MCG/ACT Nasal Suspension 2 sprays by Each Nare route daily. 11.1 mL 2    aspirin 81 MG Oral Tab EC Take 1 tablet (81 mg total) by mouth daily.      metRONIDAZOLE 500 MG Oral Tab Take 1 tablet (500 mg total) by mouth every 8 (eight) hours. 42 tablet 0    pantoprazole 40 MG Oral Tab EC Take 1 tablet (40 mg total) by mouth 2 (two) times daily. 28 tablet 0    amLODIPine 10 MG Oral Tab Take 1 tablet (10 mg total) by mouth daily. 90 tablet 1    metoprolol succinate  MG Oral Tablet 24 Hr Take 1 tablet (100 mg total) by mouth daily. 90 tablet 3    Olmesartan Medoxomil-HCTZ 40-25 MG Oral Tab Take 1 tablet by mouth daily. 90 tablet 3    naproxen 500 MG Oral Tab Take 1 tablet (500 mg total) by mouth 2 (two) times daily as needed. 15 tablet 0    Fluticasone Propionate HFA (FLOVENT HFA) 110 MCG/ACT Inhalation Aerosol Inhale 2 puffs into the lungs 2 (two) times daily. 1 each 0    Albuterol Sulfate HFA (PROVENTIL HFA) 108 (90 Base) MCG/ACT Inhalation Aero Soln Inhale 2 puffs into the lungs every 4 (four) hours as needed. 1 each 2    Dorzolamide HCl 2 % Ophthalmic Solution       brimonidine Tartrate 0.2 % Ophthalmic Solution INSTILL 1 DROP TWO TIMES A DAY IN RIGHT EYE  6    latanoprost  0.005 % Ophthalmic Solution Place 1 drop into both eyes nightly.      Flaxseed, Linseed, (FLAXSEED OIL) 1000 MG Oral Cap Take  by mouth.      Multiple Vitamin (MULTIVITAMINS) Oral Cap Take 1 capsule by mouth.      Fish Oil-Cholecalciferol (FISH OIL + D3 OR) Take 1 capsule by mouth daily.      COD LIVER OIL OR Take 1 capsule by mouth daily.      Sildenafil Citrate 50 MG Oral Tab Take 1 tablet (50 mg total) by mouth daily as needed for Erectile Dysfunction. 18 tablet 1    SUMAtriptan Succinate (IMITREX) 25 MG Oral Tab Take 1 tablet (25 mg total) by mouth every 2 (two) hours as needed for Migraine. Use at onset; repeat once after 2 HRS-ONLY 2 IN 24 HR MAX (Patient not taking: Reported on 6/25/2024) 9 tablet 1      Past Medical History:    Arrhythmia    Asthma (HCC)    Atrial fibrillation (HCC)    2005 - medical management; 2008 - [facility]; converted to NSR    Bronchitis    drug therapy    Extrinsic asthma, unspecified    Quadriceps tendon rupture, left, initial encounter    Toenail fungus    Unspecified essential hypertension      Past Surgical History:   Procedure Laterality Date    Appendectomy      Appendectomy      Colonoscopy      Colonoscopy N/A 10/2/2019    Procedure: COLONOSCOPY;  Surgeon: Jaquan Dixon MD;  Location: [facility] ENDOSCOPY    Other surgical history      (L) knee muscle surgery      Social History:  Social History     Socioeconomic History    Marital status:    Tobacco Use    Smoking status: Never    Smokeless tobacco: Never   Vaping Use    Vaping status: Never Used   Substance and Sexual Activity    Alcohol use: Yes     Comment: occasional    Drug use: No    Sexual activity: Not Currently     Partners: Female   Other Topics Concern    Caffeine Concern Yes     Comment: chocolate/tea/coffee/soda - not daily     Social Determinants of Health     Physical Activity: Sufficiently Active (6/29/2020)    Received from [employer] [employer]    Exercise  Vital Sign     Days of Exercise per Week: 3 days     Minutes of Exercise per Session: 60 min      Family History   Problem Relation Age of Onset    Cancer Mother         kidney cancer    Hypertension Mother     Hypertension Father     Prostate Cancer Father 77    Cancer Father     Diabetes Paternal Grandfather     Arthritis Daughter         rheumatoid    Heart Disease Paternal Grandmother         premature CAD    Cancer Sister     Heart Disorder Neg     Lipids Neg       Allergies   Allergen Reactions    Penicillins DIZZINESS     Other reaction(s): Dizziness        REVIEW OF SYSTEMS:     Review of Systems   Constitutional:  Negative for fever.   HENT:  Positive for congestion, ear pain (bilateral ear pressure) and rhinorrhea. Negative for sneezing.    Respiratory:  Positive for cough, shortness of breath (with activity) and wheezing.    Cardiovascular:  Negative for chest pain.   Gastrointestinal:  Negative for abdominal pain, constipation, diarrhea, nausea and vomiting.   Genitourinary: Negative.    Musculoskeletal:  Negative for arthralgias.   Skin: Negative.    Neurological:  Positive for dizziness and headaches.   Psychiatric/Behavioral: Negative.        Wt Readings from Last 5 Encounters:   06/25/24 292 lb (132.5 kg)   03/08/24 290 lb (131.5 kg)   01/03/24 290 lb (131.5 kg)   11/21/23 290 lb (131.5 kg)   11/20/23 293 lb (132.9 kg)     Body mass index is 40.73 kg/m².      EXAM:   /89   Pulse 73   Temp 98 °F (36.7 °C)   Resp 16   Ht 5' 11\" (1.803 m)   Wt 292 lb (132.5 kg)   SpO2 99%   BMI 40.73 kg/m²     Physical Exam  Vitals reviewed.   Constitutional:       Appearance: Normal appearance.   HENT:      Head: Normocephalic.      Right Ear: There is impacted cerumen.      Left Ear: Tympanic membrane normal.      Nose: Congestion present.      Mouth/Throat:      Pharynx: No posterior oropharyngeal erythema.   Eyes:      Extraocular Movements: Extraocular movements intact.      Pupils: Pupils are equal,  round, and reactive to light.   Cardiovascular:      Rate and Rhythm: Normal rate and regular rhythm.      Pulses: Normal pulses.   Pulmonary:      Breath sounds: Wheezing present.   Musculoskeletal:         General: No swelling. Normal range of motion.      Cervical back: Normal range of motion and neck supple.   Skin:     General: Skin is warm and dry.   Neurological:      Mental Status: He is alert and oriented to person, place, and time.   Psychiatric:         Mood and Affect: Mood normal.         Behavior: Behavior normal.            ASSESSMENT AND PLAN:   1. Mild intermittent asthma with acute exacerbation (HCC)  - ok to use albuterol inhaler as needed   - azithromycin (ZITHROMAX Z-NISHA) 250 MG Oral Tab; Take 2 tablets (500 mg total) by mouth daily for 1 day, THEN 1 tablet (250 mg total) daily for 4 days.  Dispense: 6 tablet; Refill: 0    2. Essential hypertension  - CPM  - BP stable in office.       The patient indicates understanding of these issues and agrees to the plan.  Return for if symptoms do not resolve.

## 2024-06-25 NOTE — TELEPHONE ENCOUNTER
Refill passed per [facility] protocol.  Requested Prescriptions   Pending Prescriptions Disp Refills    SILDENAFIL CITRATE 50 MG Oral Tab [Pharmacy Med Name: Sildenafil Citrate 50 MG Oral Tablet] 18 tablet 1     Sig: TAKE 1 TABLET BY MOUTH DAILY AS  NEEDED FOR ERECTILE DYSFUNCTION       Genitourinary Medications Passed - 6/22/2024  1:25 PM        Passed - Patient does not have pulmonary hypertension on problem list        Passed - In person appointment or virtual visit in the past 12 mos or appointment in next 3 mos     Recent Outpatient Visits              Today Mild intermittent asthma with acute exacerbation (HCC)    [facility] Kaley Carrion APRN    Office Visit    5 months ago Rectal bleeding    [facility] Jaquan Dixon MD    Office Visit    7 months ago     [facility] Hematology Oncology    Nurse Only    7 months ago Microcytosis    [facility]    [facility] Visit    7 months ago Essential hypertension    [facility] [place] Yarelis Castellanos APRN    Office Visit                         Recent Outpatient Visits              Today Mild intermittent asthma with acute exacerbation (HCC)    [facility] Kaley Carrion APRN    Office Visit    5 months ago Rectal bleeding    [facility] Jaquan Dixon MD    Office Visit    7 months ago     [facility] Hematology Oncology    Nurse Only    7 months ago [place]    [facility]ematology Oncology Wolfgang Redd MD    Office Visit    7 months ago Essential hypertension    [facility] Yarelis Castellanos APRN    Office Visit

## 2024-08-02 RX ORDER — AMLODIPINE BESYLATE 10 MG/1
10 TABLET ORAL DAILY
Qty: 90 TABLET | Refills: 3 | Status: SHIPPED | OUTPATIENT
Start: 2024-08-02

## 2024-08-02 NOTE — TELEPHONE ENCOUNTER
REFILL PASSED PER [facility] PROTOCOLS    Requested Prescriptions   Pending Prescriptions Disp Refills    AMLODIPINE 10 MG Oral Tab [Pharmacy Med Name: amLODIPine Besylate 10 MG Oral Tablet] 90 tablet 3     Sig: TAKE 1 TABLET BY MOUTH DAILY       Hypertension Medications Protocol Passed - 7/30/2024  9:27 PM        Passed - CMP or BMP in past 12 months        Passed - Last BP reading less than 140/90     BP Readings from Last 1 Encounters:   06/25/24 139/89               Passed - In person appointment or virtual visit in the past 12 mos or appointment in next 3 mos     Recent Outpatient Visits              1 month ago Mild intermittent asthma with acute exacerbation (HCC)    [facility] Kaley Carrion APRN    Office Visit    7 months ago [facility]    [facility]    8 months ago     [facility] H[facility]ology Oncology    Nurse Only    8 months ago Microcytosis    [facility] Oncology Wolfgang Redd MD    Office Visit    9 months ago [facility]    [facility]                      Passed - EGFRCR or GFRAA > 50     GFR Evaluation  EGFRCR: 87 , resulted on 11/6/2023                 Recent Outpatient Visits              1 month ago Mild intermittent asthma with acute exacerbation (HCC)    [facility]    Office Visit    7 months ago Rectal bleeding    [facility] Jaquan Mayfield MD    Office Visit    8 months ago     [facility] Hematology Oncology    Nurse Only    8 months ago Microcytosis    [facility] Hematology Oncology Wolfgang Redd MD    Office Visit    9 months ago Essential hypertension    [facility],  [place], Yarelis Rand, [place]    Office Visit

## 2024-09-19 RX ORDER — OLMESARTAN MEDOXOMIL AND HYDROCHLOROTHIAZIDE 40/25 40; 25 MG/1; MG/1
1 TABLET ORAL DAILY
Qty: 90 TABLET | Refills: 3 | Status: SHIPPED | OUTPATIENT
Start: 2024-09-19

## 2024-09-19 NOTE — TELEPHONE ENCOUNTER
REFILL PASSED PER [facility] PROTOCOLS    Requested Prescriptions   Pending Prescriptions Disp Refills    OLMESARTAN MEDOXOMIL-HCTZ 40-25 MG Oral Tab [Pharmacy Med Name: Olmesartan Medoxomil-HCTZ 40-25 MG Oral Tablet] 90 tablet 3     Sig: Take 1 tablet by mouth daily.       Hypertension Medications Protocol Passed - 9/15/2024  9:05 PM        Passed - CMP or BMP in past 12 months        Passed - Last BP reading less than 140/90     BP Readings from Last 1 Encounters:   06/25/24 139/89               Passed - In person appointment or virtual visit in the past 12 mos or appointment in next 3 mos     Recent Outpatient Visits              2 months ago Mild intermittent asthma with acute exacerbation (HCC)    [facility] Kaley Carrion APRN    Office Visit    8 months ago Rectal bleeding    [facility]    Office Visit    10 months ago     [facility] Hematology Oncology    Nurse Only    10 months ago Microcytosis    [facility]cology Wolfgang Redd MD    Office Visit    10 months ago [facility]ential hypertension    [facility]    [facility]                      Passed - EGFRCR or GFRAA > 50     GFR Evaluation  EGFRCR: 87 , resulted on 11/6/2023                 Recent Outpatient Visits              2 months ago Mild intermittent asthma with acute exacerbation (HCC)    [facility]    [facility]    8 months ago Rectal bleeding    [facility] [place] Jaquan Dixon MD    Office Visit    10 months ago     [facility] Hematology Oncology    Nurse Only    10 months ago Microcytosis    [facility]ematology Oncology Wolfgang Redd MD    Office Visit    10 months ago Essential hypertension     [facility] [place]    Office Visit

## 2024-10-14 ENCOUNTER — TELEPHONE (OUTPATIENT)
Dept: CASE MANAGEMENT | Age: 69
End: 2024-10-14

## 2024-10-14 DIAGNOSIS — Z13.6 ENCOUNTER FOR SCREENING FOR CORONARY ARTERY DISEASE: Primary | ICD-10-CM

## 2024-10-14 NOTE — TELEPHONE ENCOUNTER
Yarelis Castellanos,     Patient would like to get a Coronary CT calcium score order.     No symptoms just want to have the test.     He is requesting to have test done at [facility]     If you agree with order, please fax to : [phone]    Please call patient when order has been placed and or if you have any additional questions.     Thank you

## 2024-10-15 NOTE — TELEPHONE ENCOUNTER
Please let patient know I placed an order for a CT calcium score at [facility]    Please have him schedule is annual physical in November    Yarelis Castellanos DNP  Working with

## 2024-10-23 ENCOUNTER — OFFICE VISIT (OUTPATIENT)
Dept: INTERNAL MEDICINE CLINIC | Facility: CLINIC | Age: 69
End: 2024-10-23

## 2024-10-23 ENCOUNTER — LAB ENCOUNTER (OUTPATIENT)
Dept: LAB | Age: 69
End: 2024-10-23
Attending: INTERNAL MEDICINE
Payer: COMMERCIAL

## 2024-10-23 VITALS
TEMPERATURE: 98 F | BODY MASS INDEX: 41.16 KG/M2 | WEIGHT: 294 LBS | HEART RATE: 70 BPM | SYSTOLIC BLOOD PRESSURE: 140 MMHG | DIASTOLIC BLOOD PRESSURE: 84 MMHG | RESPIRATION RATE: 20 BRPM | HEIGHT: 71 IN

## 2024-10-23 DIAGNOSIS — J06.9 UPPER RESPIRATORY TRACT INFECTION, UNSPECIFIED TYPE: ICD-10-CM

## 2024-10-23 DIAGNOSIS — J06.9 UPPER RESPIRATORY TRACT INFECTION, UNSPECIFIED TYPE: Primary | ICD-10-CM

## 2024-10-23 PROCEDURE — 3008F BODY MASS INDEX DOCD: CPT | Performed by: INTERNAL MEDICINE

## 2024-10-23 PROCEDURE — 3079F DIAST BP 80-89 MM HG: CPT | Performed by: INTERNAL MEDICINE

## 2024-10-23 PROCEDURE — G2211 COMPLEX E/M VISIT ADD ON: HCPCS | Performed by: INTERNAL MEDICINE

## 2024-10-23 PROCEDURE — 3077F SYST BP >= 140 MM HG: CPT | Performed by: INTERNAL MEDICINE

## 2024-10-23 PROCEDURE — 87637 SARSCOV2&INF A&B&RSV AMP PRB: CPT

## 2024-10-23 PROCEDURE — 99213 OFFICE O/P EST LOW 20 MIN: CPT | Performed by: INTERNAL MEDICINE

## 2024-10-23 NOTE — PROGRESS NOTES
HPI:    Patient ID: David Koch is a 69 year old male.    HPI  Patient is here complaining of cough and congestion.  Yesterday, started with sneezing and fatigue. This morning he woke with nose congestion, burning in lungs, fatigue. Unable to go to work. Exposed to sick grandkids recently. Pt feels a little nauseous but no diarrhea. No fevers. Did have some chills. Has stuffy and runny nose. No sore thoat. Not much of a cough. No home checking for COVID or flu. Little SOB with burning in lungs. Asthma is worse.     Patient Active Problem List   Diagnosis    Asthma (HCC)    Essential hypertension    Obesity    Persistent migraine aura without cerebral infarction and without status migrainosus, not intractable    Chronic idiopathic constipation    Dizziness    Flu    Annual physical exam          HISTORY:  Past Medical History:    Arrhythmia    Asthma (HCC)    Atrial fibrillation (HCC)    2005 - medical management; 2008 - [facility]; converted to [facility]R    Bronchitis    drug therapy    Extrinsic asthma, unspecified    Quadriceps tendon rupture, left, initial encounter    Toenail fungus    Unspecified essential hypertension      Past Surgical History:   Procedure Laterality Date    Appendectomy      Appendectomy      Colonoscopy      Colonoscopy N/A 10/2/2019    Procedure: COLONOSCOPY;  Surgeon: Jaquan Dixon MD;  Location: [facility] ENDOSCOPY    Other surgical history      (L) knee muscle surgery      Family History   Problem Relation Age of Onset    Cancer Mother         kidney cancer    Hypertension Mother     Hypertension Father     Prostate Cancer Father 77    Cancer Father     Diabetes Paternal Grandfather     Arthritis Daughter         rheumatoid    Heart Disease Paternal Grandmother         premature CAD    Cancer Sister     Heart Disorder Neg     Lipids Neg       Social History     Socioeconomic History    Marital status:    Tobacco Use    Smoking status: Never    Smokeless tobacco:  Never   Vaping Use    Vaping status: Never Used   Substance and Sexual Activity    Alcohol use: Yes     Comment: occasional    Drug use: No    Sexual activity: Not Currently     Partners: Female   Other Topics Concern    Caffeine Concern Yes     Comment: chocolate/tea/coffee/soda - not daily     Social Drivers of Health     Physical Activity: Sufficiently Active (6/29/2020)    Received from [employer], [facility] [employer]    Exercise Vital Sign     Days of Exercise per Week: 3 days     Minutes of Exercise per Session: 60 min          Review of Systems          Current Outpatient Medications   Medication Sig Dispense Refill    Olmesartan Medoxomil-HCTZ 40-25 MG Oral Tab Take 1 tablet by mouth daily. 90 tablet 3    amLODIPine 10 MG Oral Tab Take 1 tablet (10 mg total) by mouth daily. 90 tablet 3    Sildenafil Citrate 50 MG Oral Tab Take 1 tablet (50 mg total) by mouth daily as needed for Erectile Dysfunction. 18 tablet 1    fluticasone propionate 50 MCG/ACT Nasal Suspension 2 sprays by Each Nare route daily. 11.1 mL 2    aspirin 81 MG Oral Tab EC Take 1 tablet (81 mg total) by mouth daily.      metRONIDAZOLE 500 MG Oral Tab Take 1 tablet (500 mg total) by mouth every 8 (eight) hours. 42 tablet 0    pantoprazole 40 MG Oral Tab EC Take 1 tablet (40 mg total) by mouth 2 (two) times daily. 28 tablet 0    metoprolol succinate  MG Oral Tablet 24 Hr Take 1 tablet (100 mg total) by mouth daily. 90 tablet 3    naproxen 500 MG Oral Tab Take 1 tablet (500 mg total) by mouth 2 (two) times daily as needed. 15 tablet 0    Fluticasone Propionate HFA (FLOVENT HFA) 110 MCG/ACT Inhalation Aerosol Inhale 2 puffs into the lungs 2 (two) times daily. 1 each 0    Albuterol Sulfate HFA (PROVENTIL HFA) 108 (90 Base) MCG/ACT Inhalation Aero Soln Inhale 2 puffs into the lungs every 4 (four) hours as needed. 1 each 2    SUMAtriptan Succinate (IMITREX) 25 MG Oral Tab Take 1 tablet (25 mg total) by mouth every 2 (two) hours as  needed for Migraine. Use at onset; repeat once after 2 HRS-ONLY 2 IN 24 HR MAX 9 tablet 1    Dorzolamide HCl 2 % Ophthalmic Solution       brimonidine Tartrate 0.2 % Ophthalmic Solution INSTILL 1 DROP TWO TIMES A DAY IN RIGHT EYE  6    latanoprost 0.005 % Ophthalmic Solution Place 1 drop into both eyes nightly.      Flaxseed, Linseed, (FLAXSEED OIL) 1000 MG Oral Cap Take  by mouth.      Multiple Vitamin (MULTIVITAMINS) Oral Cap Take 1 capsule by mouth.      Fish Oil-Cholecalciferol (FISH OIL + D3 OR) Take 1 capsule by mouth daily.      COD LIVER OIL OR Take 1 capsule by mouth daily.       Allergies:Allergies[1]     PHYSICAL EXAM:   /84 (BP Location: Left arm, Patient Position: Sitting, Cuff Size: large)   Pulse 70   Temp 97.8 °F (36.6 °C) (Other)   Resp 20   Ht 5' 11\" (1.803 m)   Wt 294 lb (133.4 kg)   BMI 41.00 kg/m²      Physical Exam  Constitutional:       Appearance: Normal appearance.   HENT:      Right Ear: Tympanic membrane and ear canal normal.      Left Ear: Tympanic membrane and ear canal normal.      Nose: Nose normal.      Mouth/Throat:      Pharynx: No oropharyngeal exudate or posterior oropharyngeal erythema.   Cardiovascular:      Rate and Rhythm: Normal rate and regular rhythm.      Pulses: Normal pulses.      Heart sounds: Normal heart sounds.   Pulmonary:      Effort: Pulmonary effort is normal.      Breath sounds: Normal breath sounds.   Lymphadenopathy:      Cervical: No cervical adenopathy.   Neurological:      Mental Status: He is alert.          Wt Readings from Last 6 Encounters:   10/23/24 294 lb (133.4 kg)   06/25/24 292 lb (132.5 kg)   03/08/24 290 lb (131.5 kg)   01/03/24 290 lb (131.5 kg)   11/21/23 290 lb (131.5 kg)   11/20/23 293 lb (132.9 kg)             ASSESSMENT/PLAN:   1. Upper respiratory tract infection, unspecified type  Patient with 24 hours or so of upper respiratory symptoms.  Exam is benign.  Lungs are clear.  He does have underlying asthma.  We will send him  down to swab for COVID as well as influenza A and B.  Possible treatment pending the results of those tests.  No need for chest x-ray right now.  Encouraged rest and fluids.  - POC Alere i Influenza A & B [35339]  - POC COVID19 BinaxNOW Antigen         Meds This Visit:  Requested Prescriptions      No prescriptions requested or ordered in this encounter       Imaging & Referrals:  None         Jarod Husain MD        [1]   Allergies  Allergen Reactions    Penicillins DIZZINESS     Other reaction(s): Dizziness

## 2024-10-24 LAB
FLUAV + FLUBV RNA SPEC NAA+PROBE: NOT DETECTED
FLUAV + FLUBV RNA SPEC NAA+PROBE: NOT DETECTED
RSV RNA SPEC NAA+PROBE: NOT DETECTED
SARS-COV-2 RNA RESP QL NAA+PROBE: NOT DETECTED

## 2024-11-11 ENCOUNTER — OFFICE VISIT (OUTPATIENT)
Dept: INTERNAL MEDICINE CLINIC | Facility: CLINIC | Age: 69
End: 2024-11-11
Payer: COMMERCIAL

## 2024-11-11 ENCOUNTER — LAB ENCOUNTER (OUTPATIENT)
Dept: LAB | Age: 69
End: 2024-11-11
Attending: NURSE PRACTITIONER
Payer: COMMERCIAL

## 2024-11-11 ENCOUNTER — TELEPHONE (OUTPATIENT)
Dept: INTERNAL MEDICINE CLINIC | Facility: CLINIC | Age: 69
End: 2024-11-11

## 2024-11-11 VITALS
RESPIRATION RATE: 16 BRPM | HEART RATE: 84 BPM | DIASTOLIC BLOOD PRESSURE: 122 MMHG | SYSTOLIC BLOOD PRESSURE: 150 MMHG | WEIGHT: 290 LBS | HEIGHT: 72 IN | BODY MASS INDEX: 39.28 KG/M2

## 2024-11-11 DIAGNOSIS — G43.509 PERSISTENT MIGRAINE AURA WITHOUT CEREBRAL INFARCTION AND WITHOUT STATUS MIGRAINOSUS, NOT INTRACTABLE: ICD-10-CM

## 2024-11-11 DIAGNOSIS — N52.9 ERECTILE DYSFUNCTION, UNSPECIFIED ERECTILE DYSFUNCTION TYPE: ICD-10-CM

## 2024-11-11 DIAGNOSIS — M48.062 SPINAL STENOSIS OF LUMBAR REGION WITH NEUROGENIC CLAUDICATION: ICD-10-CM

## 2024-11-11 DIAGNOSIS — E53.8 VITAMIN B 12 DEFICIENCY: ICD-10-CM

## 2024-11-11 DIAGNOSIS — Z12.5 PROSTATE CANCER SCREENING: ICD-10-CM

## 2024-11-11 DIAGNOSIS — Z23 FLU VACCINE NEED: ICD-10-CM

## 2024-11-11 DIAGNOSIS — J45.20 MILD INTERMITTENT ASTHMA WITHOUT COMPLICATION (HCC): ICD-10-CM

## 2024-11-11 DIAGNOSIS — I10 ESSENTIAL HYPERTENSION: ICD-10-CM

## 2024-11-11 DIAGNOSIS — E55.9 VITAMIN D DEFICIENCY: ICD-10-CM

## 2024-11-11 DIAGNOSIS — Z00.00 ANNUAL PHYSICAL EXAM: Primary | ICD-10-CM

## 2024-11-11 DIAGNOSIS — Z00.00 ANNUAL PHYSICAL EXAM: ICD-10-CM

## 2024-11-11 LAB
ALBUMIN SERPL-MCNC: 4.6 G/DL (ref 3.2–4.8)
ALBUMIN/GLOB SERPL: 1.4 {RATIO} (ref 1–2)
ALP LIVER SERPL-CCNC: 52 U/L
ALT SERPL-CCNC: 26 U/L
ANION GAP SERPL CALC-SCNC: 5 MMOL/L (ref 0–18)
AST SERPL-CCNC: 34 U/L (ref ?–34)
BILIRUB SERPL-MCNC: 0.7 MG/DL (ref 0.2–1.1)
BUN BLD-MCNC: 12 MG/DL (ref 9–23)
BUN/CREAT SERPL: 11.9 (ref 10–20)
CALCIUM BLD-MCNC: 10.2 MG/DL (ref 8.7–10.4)
CHLORIDE SERPL-SCNC: 104 MMOL/L (ref 98–112)
CHOLEST SERPL-MCNC: 201 MG/DL (ref ?–200)
CO2 SERPL-SCNC: 32 MMOL/L (ref 21–32)
COMPLEXED PSA SERPL-MCNC: 2.04 NG/ML (ref ?–4)
CREAT BLD-MCNC: 1.01 MG/DL
EGFRCR SERPLBLD CKD-EPI 2021: 82 ML/MIN/1.73M2 (ref 60–?)
FASTING PATIENT LIPID ANSWER: YES
FASTING STATUS PATIENT QL REPORTED: YES
GLOBULIN PLAS-MCNC: 3.3 G/DL (ref 2–3.5)
GLUCOSE BLD-MCNC: 85 MG/DL (ref 70–99)
HDLC SERPL-MCNC: 49 MG/DL (ref 40–59)
LDLC SERPL CALC-MCNC: 138 MG/DL (ref ?–100)
NONHDLC SERPL-MCNC: 152 MG/DL (ref ?–130)
OSMOLALITY SERPL CALC.SUM OF ELEC: 291 MOSM/KG (ref 275–295)
POTASSIUM SERPL-SCNC: 4 MMOL/L (ref 3.5–5.1)
PROT SERPL-MCNC: 7.9 G/DL (ref 5.7–8.2)
SODIUM SERPL-SCNC: 141 MMOL/L (ref 136–145)
TRIGL SERPL-MCNC: 79 MG/DL (ref 30–149)
TSI SER-ACNC: 2.68 UIU/ML (ref 0.55–4.78)
VIT B12 SERPL-MCNC: 990 PG/ML (ref 211–911)
VIT D+METAB SERPL-MCNC: 42.1 NG/ML (ref 30–100)
VLDLC SERPL CALC-MCNC: 14 MG/DL (ref 0–30)

## 2024-11-11 PROCEDURE — 82607 VITAMIN B-12: CPT

## 2024-11-11 PROCEDURE — 85060 BLOOD SMEAR INTERPRETATION: CPT

## 2024-11-11 PROCEDURE — 80061 LIPID PANEL: CPT

## 2024-11-11 PROCEDURE — 85025 COMPLETE CBC W/AUTO DIFF WBC: CPT

## 2024-11-11 PROCEDURE — 80053 COMPREHEN METABOLIC PANEL: CPT

## 2024-11-11 PROCEDURE — 84443 ASSAY THYROID STIM HORMONE: CPT

## 2024-11-11 PROCEDURE — 36415 COLL VENOUS BLD VENIPUNCTURE: CPT

## 2024-11-11 PROCEDURE — 82306 VITAMIN D 25 HYDROXY: CPT

## 2024-11-11 NOTE — PROGRESS NOTES
David Koch is a 69 year old male.  Chief Complaint   Patient presents with    Physical     HPI:   He presents for his annual physical exam.  He has a history of hypertension, asthma, migraines and obesity.    He is currently using FMLA as needed for asthma. He is allowed 3 days in months for possible exacerbations. His asthma has been controlled.  He is in need of his FMLA paperwork to be renewed.    CT calcium score completed on October 27.Total calcium score of 0. No identifiable atherosclerotic plaque. Very low cardiovascular   disease risk.     Flu vaccine needed.     Current Outpatient Medications   Medication Sig Dispense Refill    Olmesartan Medoxomil-HCTZ 40-25 MG Oral Tab Take 1 tablet by mouth daily. 90 tablet 3    amLODIPine 10 MG Oral Tab Take 1 tablet (10 mg total) by mouth daily. 90 tablet 3    Sildenafil Citrate 50 MG Oral Tab Take 1 tablet (50 mg total) by mouth daily as needed for Erectile Dysfunction. 18 tablet 1    fluticasone propionate 50 MCG/ACT Nasal Suspension 2 sprays by Each Nare route daily. 11.1 mL 2    aspirin 81 MG Oral Tab EC Take 1 tablet (81 mg total) by mouth daily.      metoprolol succinate  MG Oral Tablet 24 Hr Take 1 tablet (100 mg total) by mouth daily. 90 tablet 3    naproxen 500 MG Oral Tab Take 1 tablet (500 mg total) by mouth 2 (two) times daily as needed. 15 tablet 0    Fluticasone Propionate HFA (FLOVENT HFA) 110 MCG/ACT Inhalation Aerosol Inhale 2 puffs into the lungs 2 (two) times daily. 1 each 0    Albuterol Sulfate HFA (PROVENTIL HFA) 108 (90 Base) MCG/ACT Inhalation Aero Soln Inhale 2 puffs into the lungs every 4 (four) hours as needed. 1 each 2    Dorzolamide HCl 2 % Ophthalmic Solution       brimonidine Tartrate 0.2 % Ophthalmic Solution INSTILL 1 DROP TWO TIMES A DAY IN RIGHT EYE  6    latanoprost 0.005 % Ophthalmic Solution Place 1 drop into both eyes nightly.      Flaxseed, Linseed, (FLAXSEED OIL) 1000 MG Oral Cap Take  by mouth.      Multiple Vitamin  (MULTIVITAMINS) Oral Cap Take 1 capsule by mouth.      Fish Oil-Cholecalciferol (FISH OIL + D3 OR) Take 1 capsule by mouth daily.      COD LIVER OIL OR Take 1 capsule by mouth daily.        Past Medical History:    Arrhythmia    Asthma (HCC)    Atrial fibrillation (HCC)    2005 - medical management; 2008 - [facility]; converted to NSR    Bronchitis    drug therapy    Extrinsic asthma, unspecified    Quadriceps tendon rupture, left, initial encounter    Toenail fungus    Unspecified essential hypertension      Past Surgical History:   Procedure Laterality Date    Appendectomy      Appendectomy      Colonoscopy      Colonoscopy N/A 10/2/2019    Procedure: COLONOSCOPY;  Surgeon: Jaquan Dixon MD;  Location[facility]    Other surgical history      (L) knee muscle surgery      Social History:  Social History     Socioeconomic History    Marital status:    Tobacco Use    Smoking status: Never    Smokeless tobacco: Never   Vaping Use    Vaping status: Never Used   Substance and Sexual Activity    Alcohol use: Yes     Comment: occasional    Drug use: No    Sexual activity: Not Currently     Partners: Female   Other Topics Concern    Caffeine Concern Yes     Comment: chocolate/tea/coffee/soda - not daily     Social Drivers of Health     Physical Activity: Sufficiently Active (6/29/2020)    Received from [employer] [employer]    Exercise Vital Sign     Days of Exercise per Week: 3 days     Minutes of Exercise per Session: 60 min      Family History   Problem Relation Age of Onset    Cancer Mother         kidney cancer    Hypertension Mother     Hypertension Father     Prostate Cancer Father 77    Cancer Father     Diabetes Paternal Grandfather     Arthritis Daughter         rheumatoid    Heart Disease Paternal Grandmother         premature CAD    Cancer Sister     Heart Disorder Neg     Lipids Neg       Allergies[1]     REVIEW OF SYSTEMS:     Review of Systems    Constitutional:  Negative for fever.   HENT: Negative.     Respiratory:  Negative for cough, shortness of breath and wheezing.    Cardiovascular:  Negative for chest pain.   Gastrointestinal: Negative.    Musculoskeletal:  Positive for back pain.   Skin: Negative.    Neurological: Negative.    Psychiatric/Behavioral: Negative.        Wt Readings from Last 5 Encounters:   11/11/24 290 lb (131.5 kg)   10/23/24 294 lb (133.4 kg)   06/25/24 292 lb (132.5 kg)   03/08/24 290 lb (131.5 kg)   01/03/24 290 lb (131.5 kg)     Body mass index is 39.33 kg/m².      EXAM:   BP (!) 150/122 (BP Location: Right arm, Patient Position: Sitting, Cuff Size: large)   Pulse 84   Resp 16   Ht 6' (1.829 m)   Wt 290 lb (131.5 kg)   BMI 39.33 kg/m²     Physical Exam  Vitals reviewed.   Constitutional:       Appearance: Normal appearance.   HENT:      Head: Normocephalic.      Right Ear: Tympanic membrane normal.      Left Ear: Tympanic membrane normal.      Nose: No congestion.   Eyes:      Extraocular Movements: Extraocular movements intact.      Pupils: Pupils are equal, round, and reactive to light.   Cardiovascular:      Rate and Rhythm: Normal rate and regular rhythm.      Pulses: Normal pulses.   Pulmonary:      Breath sounds: Normal breath sounds. No wheezing.   Abdominal:      General: Bowel sounds are normal.      Palpations: Abdomen is soft.      Tenderness: There is no abdominal tenderness.   Musculoskeletal:         General: No swelling. Normal range of motion.      Cervical back: Normal range of motion and neck supple.   Skin:     General: Skin is warm and dry.   Neurological:      Mental Status: He is alert and oriented to person, place, and time.   Psychiatric:         Mood and Affect: Mood normal.         Behavior: Behavior normal.            ASSESSMENT AND PLAN:   1. Annual physical exam  - CBC With Differential With Platelet; Future  - Comp Metabolic Panel (14); Future  - TSH W Reflex To Free T4 [E]; Future  - Lipid  Panel [E]; Future    2. Mild intermittent asthma without complication (HCC)  - stable   - CPM    3. Essential hypertension  -Blood pressure elevated in office.  Per patient he did not take his blood pressure medication today.  Discussed with patient to monitor his blood pressure at home and send a Swish message with results.    4. Persistent migraine aura without cerebral infarction and without status migrainosus, not intractable  -Stable  -Per patient he has not had any episodes of migraines in years.  If he does have an episode he takes sumatriptan as needed.    5. Erectile dysfunction, unspecified erectile dysfunction type  -CPM  -Continue sildenafil 50 mg    6. Spinal stenosis of lumbar region with neurogenic claudication  -Patient previously saw an orthopedic physician back in 2022.  He had an MRI of the spine completed at that time which showed moderate degenerative changes and moderate to severe narrowing at L4-L5. Moderate bilateral foraminal narrowing is seen at L5-S1.  Per Ortho's recommendation patient was to start physical therapy.  Patient states due to his job schedule he was not able to start PT. He is interested in doing PT. Will place order. If symptoms do not improve patient will follow up with ortho.   - Physical Therapy Referral - [place] Locations    7. Flu vaccine need  - High Dose Fluzone trivalent influenza, 65yrs+ PFS ([id number])    8. Prostate cancer screening  - PSA (Screening) [E]; Future    9. Vitamin B 12 deficiency  - Vitamin B12 [E]; Future    10. Vitamin D deficiency  - Vitamin D; Future      The patient indicates understanding of these issues and agrees to the plan.  Return in about 6 months (around 5/11/2025).         [1]   Allergies  Allergen Reactions    Penicillins DIZZINESS     Other reaction(s): Dizziness

## 2024-11-12 LAB
BASOPHILS # BLD AUTO: 0.06 X10(3) UL (ref 0–0.2)
BASOPHILS NFR BLD AUTO: 0.9 %
DEPRECATED RDW RBC AUTO: 48.3 FL (ref 35.1–46.3)
EOSINOPHIL # BLD AUTO: 0.19 X10(3) UL (ref 0–0.7)
EOSINOPHIL NFR BLD AUTO: 2.9 %
ERYTHROCYTE [DISTWIDTH] IN BLOOD BY AUTOMATED COUNT: 19 % (ref 11–15)
HCT VFR BLD AUTO: 48.8 %
HGB BLD-MCNC: 15.2 G/DL
IMM GRANULOCYTES # BLD AUTO: 0.03 X10(3) UL (ref 0–1)
IMM GRANULOCYTES NFR BLD: 0.5 %
LYMPHOCYTES # BLD AUTO: 2.49 X10(3) UL (ref 1–4)
LYMPHOCYTES NFR BLD AUTO: 38.4 %
MCH RBC QN AUTO: 23.6 PG (ref 26–34)
MCHC RBC AUTO-ENTMCNC: 31.1 G/DL (ref 31–37)
MCV RBC AUTO: 75.9 FL
MONOCYTES # BLD AUTO: 0.5 X10(3) UL (ref 0.1–1)
MONOCYTES NFR BLD AUTO: 7.7 %
NEUTROPHILS # BLD AUTO: 3.22 X10 (3) UL (ref 1.5–7.7)
NEUTROPHILS # BLD AUTO: 3.22 X10(3) UL (ref 1.5–7.7)
NEUTROPHILS NFR BLD AUTO: 49.6 %
PLATELET # BLD AUTO: 320 10(3)UL (ref 150–450)
RBC # BLD AUTO: 6.43 X10(6)UL
WBC # BLD AUTO: 6.5 X10(3) UL (ref 4–11)

## 2024-11-15 NOTE — TELEPHONE ENCOUNTER
Family Medical Leave Act forms received by the forms department and logged for processing. Sent [employer] message for Release of Information.

## 2024-11-18 NOTE — TELEPHONE ENCOUNTER
Patient called had questions on TAMERA- all questions answered  Per patient forms are re cert    11/06/24-11/06/25  1 flare up per month each episode lasting 1-72 hours or 1-3 days.

## 2024-11-21 RX ORDER — FLUTICASONE PROPIONATE 50 MCG
2 SPRAY, SUSPENSION (ML) NASAL DAILY
Qty: 48 G | Refills: 3 | Status: SHIPPED | OUTPATIENT
Start: 2024-11-21

## 2024-11-21 NOTE — TELEPHONE ENCOUNTER
Refill passed per [facility] protocol.     Requested Prescriptions   Pending Prescriptions Disp Refills    FLUTICASONE PROPIONATE 50 MCG/ACT Nasal Suspension [Pharmacy Med Name: Fluticasone Propionate 50 MCG/ACT Nasal Suspension] 48 g 0     Sig: USE 2 SPRAYS IN BOTH NOSTRILS  DAILY       Allergy Medication Protocol Passed - 11/21/2024 11:47 AM        Passed - In person appointment or virtual visit in the past 12 mos or appointment in next 3 mos     Recent Out[facility]its              [facility]    [facility] Visit    4 weeks ago Upper respiratory tract infection, unspecified type    [facility]    [facility] Visit    4 months ago Mild intermittent asthma with acute exacerbation (HCC)    [facility]    [facility]    10 months ago [facility]    [facility] Visit    1 year ago     [facility]    Nurse Only                                 Recent Outpatient Visits              1 week ago [facility] physical [facility]    Office Visit    4 weeks ago Upper respiratory tract infection, unspecified type    [facility] Jarod Lundy MD    Office Visit    4 months ago Mild intermittent asthma with acute exacerbation (HCC)    [facility]    Office Visit    10 months ago Rectal bleeding    [facility]    Office Visit    1 year ago     [facility]    Nurse Only

## 2024-11-30 ENCOUNTER — IMMUNIZATION (OUTPATIENT)
Dept: LAB | Age: 69
End: 2024-11-30
Attending: EMERGENCY MEDICINE
Payer: COMMERCIAL

## 2024-11-30 DIAGNOSIS — Z23 NEED FOR VACCINATION: Primary | ICD-10-CM

## 2024-11-30 PROCEDURE — 90480 ADMN SARSCOV2 VAC 1/ONLY CMP: CPT

## 2024-12-04 NOTE — TELEPHONE ENCOUNTER
Kaley    Please sign off on form if you agree to: Intermittent Family Medical Leave Act due to asthma. 1 flare up a month, each lasting 1-72hrs (1-3days). Start date 11/06/24-11/06/25    -Signature page will be the first page scanned  -From your Inbasket, Highlight the patient and click Chart   -Double click the 11/11/2024 Forms Completion telephone encounter  -Scroll down to the Media section   -Click the blue Hyperlink: Family Medical Leave Act CODY Carrion 12/04/24  -Click Acknowledge located in the top right ribbon/menu   -Drag the mouse into the blank space of the document and a + sign will appear. Left click to   electronically sign the document.  -Once signed, simply exit out of the screen and you signature will be saved.     Thank you,      Radha

## 2024-12-09 ENCOUNTER — TELEPHONE (OUTPATIENT)
Dept: INTERNAL MEDICINE CLINIC | Facility: CLINIC | Age: 69
End: 2024-12-09

## 2024-12-09 NOTE — TELEPHONE ENCOUNTER
Patient Called. His previous Intermittent form was completed and got denied because it did not get turned in on time. Company asked to start process again. Patient will need new form revised with new dates sammy information as before.     12/09/24-12/09/25  1 flare up per month each episode lasting 1-72 hours or 1-3 days.

## 2024-12-09 NOTE — TELEPHONE ENCOUNTER
Kaley,     Patient Is requesting intermittent form to be filled out once again for dates 12/09/24-12/09/25    1 flare up per month each episode lasting 1-72 hours or 1-3 days.    Do you support?    Thank you.    Lesli CARRERA

## 2024-12-11 NOTE — TELEPHONE ENCOUNTER
Forms E faxed     Efax Date: 12/11/24  Time fax sent: 3:14 pm  Confirmation time : 3:27 pm  JOB ID:[id number]    Family Medical Leave Act Forms completed and signed faxed to [employer] [phone] confirmation received.  Moved to ARCHIVE

## 2024-12-11 NOTE — TELEPHONE ENCOUNTER
Kaley,    Please sign off on form if you agree to: Intermittent Family Medical Leave Act     -Signature page will be the first page scanned  -From your Inbasket, Highlight the patient and click Chart   -Double click the 12/9/24 Forms Completion telephone encounter  -Scroll down to the Media section   -Click the blue Hyperlink: Family Medical Leave Act   CODY CarrionCOLEEN  12/11/24  -Click Acknowledge located in the top right ribbon/menu   -Drag the mouse into the blank space of the document and a + sign will appear. Left click to   electronically sign the document.  -Once signed, simply exit out of the screen and you signature will be saved.     Thank you,    Jesus ANGEL

## 2024-12-12 RX ORDER — METOPROLOL SUCCINATE 100 MG/1
100 TABLET, EXTENDED RELEASE ORAL DAILY
Qty: 90 TABLET | Refills: 3 | OUTPATIENT
Start: 2024-12-12

## 2025-03-05 ENCOUNTER — TELEPHONE (OUTPATIENT)
Dept: INTERNAL MEDICINE CLINIC | Facility: CLINIC | Age: 70
End: 2025-03-05

## 2025-03-05 PROBLEM — R07.2 PRECORDIAL PAIN: Status: ACTIVE | Noted: 2020-04-16

## 2025-03-05 RX ORDER — IBUPROFEN 800 MG/1
800 TABLET, FILM COATED ORAL 3 TIMES DAILY PRN
COMMUNITY
Start: 2025-01-17

## 2025-03-05 RX ORDER — AZITHROMYCIN 250 MG/1
TABLET, FILM COATED ORAL
COMMUNITY
Start: 2024-11-25 | End: 2025-03-07 | Stop reason: ALTCHOICE

## 2025-03-05 RX ORDER — CLINDAMYCIN HYDROCHLORIDE 300 MG/1
CAPSULE ORAL
COMMUNITY
Start: 2025-01-17 | End: 2025-03-07 | Stop reason: ALTCHOICE

## 2025-03-05 NOTE — TELEPHONE ENCOUNTER
Per patient he is going to have a rotator cuff surgery on 03/21/2025 and need a medical clearance no available appointment with Dr Husain. Can APRN do it.  Please advise.  Thank you.    Please reply to pool: EM CC IM FM ALG RHE

## 2025-03-07 ENCOUNTER — HOSPITAL ENCOUNTER (OUTPATIENT)
Dept: GENERAL RADIOLOGY | Facility: HOSPITAL | Age: 70
Discharge: HOME | End: 2025-03-07
Attending: Nurse Practitioner
Payer: COMMERCIAL

## 2025-03-07 ENCOUNTER — LAB ENCOUNTER (OUTPATIENT)
Dept: LAB | Facility: HOSPITAL | Age: 70
End: 2025-03-07
Attending: Nurse Practitioner
Payer: COMMERCIAL

## 2025-03-07 ENCOUNTER — OFFICE VISIT (OUTPATIENT)
Dept: INTERNAL MEDICINE CLINIC | Facility: CLINIC | Age: 70
End: 2025-03-07

## 2025-03-07 VITALS
SYSTOLIC BLOOD PRESSURE: 134 MMHG | WEIGHT: 296.63 LBS | OXYGEN SATURATION: 99 % | HEIGHT: 72 IN | TEMPERATURE: 97 F | DIASTOLIC BLOOD PRESSURE: 78 MMHG | BODY MASS INDEX: 40.18 KG/M2 | HEART RATE: 55 BPM

## 2025-03-07 DIAGNOSIS — J45.20 MILD INTERMITTENT ASTHMA WITHOUT COMPLICATION (HCC): ICD-10-CM

## 2025-03-07 DIAGNOSIS — Z01.818 PRE-OP EXAMINATION: ICD-10-CM

## 2025-03-07 DIAGNOSIS — J45.909 UNCOMPLICATED ASTHMA (HCC): ICD-10-CM

## 2025-03-07 DIAGNOSIS — I10 ESSENTIAL HYPERTENSION: ICD-10-CM

## 2025-03-07 DIAGNOSIS — Z01.818 PRE-OP EXAMINATION: Primary | ICD-10-CM

## 2025-03-07 LAB
ALBUMIN SERPL-MCNC: 4.7 G/DL (ref 3.2–4.8)
ALBUMIN/GLOB SERPL: 1.6 {RATIO} (ref 1–2)
ALP LIVER SERPL-CCNC: 50 U/L
ALT SERPL-CCNC: 23 U/L
ANION GAP SERPL CALC-SCNC: 7 MMOL/L (ref 0–18)
APTT PPP: 27.2 SECONDS (ref 23–36)
AST SERPL-CCNC: 26 U/L (ref ?–34)
ATRIAL RATE: 62 BPM
BASOPHILS # BLD AUTO: 0.05 X10(3) UL (ref 0–0.2)
BASOPHILS NFR BLD AUTO: 0.7 %
BILIRUB SERPL-MCNC: 0.7 MG/DL (ref 0.2–1.1)
BUN BLD-MCNC: 11 MG/DL (ref 9–23)
BUN/CREAT SERPL: 10.9 (ref 10–20)
CALCIUM BLD-MCNC: 9.3 MG/DL (ref 8.7–10.4)
CHLORIDE SERPL-SCNC: 102 MMOL/L (ref 98–112)
CO2 SERPL-SCNC: 29 MMOL/L (ref 21–32)
CREAT BLD-MCNC: 1.01 MG/DL
DEPRECATED RDW RBC AUTO: 45.3 FL (ref 35.1–46.3)
EGFRCR SERPLBLD CKD-EPI 2021: 82 ML/MIN/1.73M2 (ref 60–?)
EOSINOPHIL # BLD AUTO: 0.2 X10(3) UL (ref 0–0.7)
EOSINOPHIL NFR BLD AUTO: 2.7 %
ERYTHROCYTE [DISTWIDTH] IN BLOOD BY AUTOMATED COUNT: 18.3 % (ref 11–15)
FASTING STATUS PATIENT QL REPORTED: YES
GLOBULIN PLAS-MCNC: 3 G/DL (ref 2–3.5)
GLUCOSE BLD-MCNC: 88 MG/DL (ref 70–99)
HCT VFR BLD AUTO: 49.5 %
HGB BLD-MCNC: 15.8 G/DL
IMM GRANULOCYTES # BLD AUTO: 0.02 X10(3) UL (ref 0–1)
IMM GRANULOCYTES NFR BLD: 0.3 %
INR BLD: 0.96 (ref 0.8–1.2)
LYMPHOCYTES # BLD AUTO: 2.37 X10(3) UL (ref 1–4)
LYMPHOCYTES NFR BLD AUTO: 32.5 %
MCH RBC QN AUTO: 24.1 PG (ref 26–34)
MCHC RBC AUTO-ENTMCNC: 31.9 G/DL (ref 31–37)
MCV RBC AUTO: 75.5 FL
MONOCYTES # BLD AUTO: 0.51 X10(3) UL (ref 0.1–1)
MONOCYTES NFR BLD AUTO: 7 %
NEUTROPHILS # BLD AUTO: 4.15 X10 (3) UL (ref 1.5–7.7)
NEUTROPHILS # BLD AUTO: 4.15 X10(3) UL (ref 1.5–7.7)
NEUTROPHILS NFR BLD AUTO: 56.8 %
OSMOLALITY SERPL CALC.SUM OF ELEC: 285 MOSM/KG (ref 275–295)
P AXIS: 36 DEGREES
P-R INTERVAL: 274 MS
PLATELET # BLD AUTO: 363 10(3)UL (ref 150–450)
POTASSIUM SERPL-SCNC: 3.6 MMOL/L (ref 3.5–5.1)
PROT SERPL-MCNC: 7.7 G/DL (ref 5.7–8.2)
PROTHROMBIN TIME: 13.3 SECONDS (ref 11.6–14.8)
Q-T INTERVAL: 434 MS
QRS DURATION: 86 MS
QTC CALCULATION (BEZET): 440 MS
R AXIS: 22 DEGREES
RBC # BLD AUTO: 6.56 X10(6)UL
SODIUM SERPL-SCNC: 138 MMOL/L (ref 136–145)
T AXIS: 42 DEGREES
VENTRICULAR RATE: 62 BPM
WBC # BLD AUTO: 7.3 X10(3) UL (ref 4–11)

## 2025-03-07 PROCEDURE — 93005 ELECTROCARDIOGRAM TRACING: CPT

## 2025-03-07 PROCEDURE — 3078F DIAST BP <80 MM HG: CPT | Performed by: NURSE PRACTITIONER

## 2025-03-07 PROCEDURE — 3008F BODY MASS INDEX DOCD: CPT | Performed by: NURSE PRACTITIONER

## 2025-03-07 PROCEDURE — 85730 THROMBOPLASTIN TIME PARTIAL: CPT

## 2025-03-07 PROCEDURE — 71046 X-RAY EXAM CHEST 2 VIEWS: CPT | Performed by: NURSE PRACTITIONER

## 2025-03-07 PROCEDURE — 80053 COMPREHEN METABOLIC PANEL: CPT

## 2025-03-07 PROCEDURE — 93010 ELECTROCARDIOGRAM REPORT: CPT | Performed by: INTERNAL MEDICINE

## 2025-03-07 PROCEDURE — 99214 OFFICE O/P EST MOD 30 MIN: CPT | Performed by: NURSE PRACTITIONER

## 2025-03-07 PROCEDURE — 3075F SYST BP GE 130 - 139MM HG: CPT | Performed by: NURSE PRACTITIONER

## 2025-03-07 PROCEDURE — 85025 COMPLETE CBC W/AUTO DIFF WBC: CPT

## 2025-03-07 PROCEDURE — 85610 PROTHROMBIN TIME: CPT

## 2025-03-07 PROCEDURE — 36415 COLL VENOUS BLD VENIPUNCTURE: CPT

## 2025-03-07 RX ORDER — ALBUTEROL SULFATE 90 UG/1
2 INHALANT RESPIRATORY (INHALATION) EVERY 4 HOURS PRN
Qty: 1 EACH | Refills: 2 | Status: SHIPPED | OUTPATIENT
Start: 2025-03-07

## 2025-03-07 NOTE — PATIENT INSTRUCTIONS
Once labs, EKG, chest xray are completed, I will review and fax the results/clearance to the surgery center.

## 2025-03-07 NOTE — PROGRESS NOTES
Subjective:   David Koch is a 69 year old male with PMH Afib, HTN, asthma,  who presents for Pre-Op Exam     Pt presents for preoperative clearance.  Work injury - moving boxes - went to stack a heavy box and something popped in his shoulder.  Hx asthma - exacerbated by things like candles lit by his wife. Otherwise well-managed.  Inhaler use - maybe once/month  Maybe 2 times/month night symptoms.  No trouble with anesthesia during past procedures.  Has never been told that he had a difficult airway    Planned surgery - R rotator cuff repair  Type of anesthesia - general  Surgeon - Dr. Khoi Duncan - [facility] center  Date of surgery - 3/21/25  Cardiac history - Atrial fib - back in 2005 - no episodes for many years - last saw cards 2 years ago and was told he did not need to follow up.  Myocardial infarction in past 6 months - No  Bleeding disorders - No  Taking blood thinners - No  Chest pain or shortness of breath with ADLs - No  Number of alcoholic beverages consumed weekly - Zero on a typical week. Rare etoh.  Tobacco consumption - No    Functional Capacity/METs:   Perform ADLs- eating, dress, toilet? Y  Walk up flight of steps, hill, walk ground level 3-4mph? Y  Perform heavy housework- scrubbing floors, move heavy furniture, climb 2 flights of stairs? Y - limited only by shoulder injury  Participate in strenuous sports- swimming, singles tennis, football, basketball, ski? N rides a bicycle    Revised Cardiac Risk Index: 0 / 3.9% of major cardiac event    Patient has no major clinical predictors going for the above surgery.  Patient has reasonable functional capacity as described above. I have ordered tests as requested by surgeon. If they are acceptable, I will consider patient medically cleared for surgery as low risk for perioperative cardiac events with routine perioperative care.  DVT prophylaxis as protocol  The above risk assessment was discussed with the patient and would like to proceed with the  surgery.          History/Other:    Chief Complaint Reviewed and Verified  No Further Nursing Notes to   Review  Tobacco Reviewed  Allergies Reviewed  Medications Reviewed    Problem List Reviewed  Medical History Reviewed  Surgical History   Reviewed  Family History Reviewed  Social History Reviewed         Tobacco:  He has never smoked tobacco.    Current Outpatient Medications   Medication Sig Dispense Refill    albuterol (PROVENTIL HFA) 108 (90 Base) MCG/ACT Inhalation Aero Soln Inhale 2 puffs into the lungs every 4 (four) hours as needed. 1 each 2    ibuprofen 800 MG Oral Tab Take 1 tablet (800 mg total) by mouth 3 (three) times daily as needed.      fluticasone propionate 50 MCG/ACT Nasal Suspension 2 sprays by Nasal route daily. 48 g 3    Olmesartan Medoxomil-HCTZ 40-25 MG Oral Tab Take 1 tablet by mouth daily. 90 tablet 3    amLODIPine 10 MG Oral Tab Take 1 tablet (10 mg total) by mouth daily. 90 tablet 3    Sildenafil Citrate 50 MG Oral Tab Take 1 tablet (50 mg total) by mouth daily as needed for Erectile Dysfunction. 18 tablet 1    aspirin 81 MG Oral Tab EC Take 1 tablet (81 mg total) by mouth daily.      metoprolol succinate  MG Oral Tablet 24 Hr Take 1 tablet (100 mg total) by mouth daily. 90 tablet 3    naproxen 500 MG Oral Tab Take 1 tablet (500 mg total) by mouth 2 (two) times daily as needed. 15 tablet 0    Dorzolamide HCl 2 % Ophthalmic Solution       brimonidine Tartrate 0.2 % Ophthalmic Solution INSTILL 1 DROP TWO TIMES A DAY IN RIGHT EYE  6    latanoprost 0.005 % Ophthalmic Solution Place 1 drop into both eyes nightly.      Flaxseed, Linseed, (FLAXSEED OIL) 1000 MG Oral Cap Take  by mouth.      Multiple Vitamin (MULTIVITAMINS) Oral Cap Take 1 capsule by mouth.      Fish Oil-Cholecalciferol (FISH OIL + D3 OR) Take 1 capsule by mouth daily.      COD LIVER OIL OR Take 1 capsule by mouth daily.           Review of Systems:  Review of Systems  10 point review of systems otherwise  negative with the exception of HPI and assessment and plan.    Objective:   /78   Pulse 55   Temp 97.3 °F (36.3 °C) (Temporal)   Ht 6' (1.829 m)   Wt 296 lb 9.6 oz (134.5 kg)   SpO2 99%   BMI 40.23 kg/m²  Estimated body mass index is 40.23 kg/m² as calculated from the following:    Height as of this encounter: 6' (1.829 m).    Weight as of this encounter: 296 lb 9.6 oz (134.5 kg).      Physical Exam  Vitals reviewed.   Constitutional:       Appearance: He is well-developed and normal weight.   HENT:      Head: Normocephalic and atraumatic.      Right Ear: Tympanic membrane and external ear normal.      Left Ear: Tympanic membrane and external ear normal.      Nose: Nose normal.      Mouth/Throat:      Mouth: Mucous membranes are moist.      Pharynx: Oropharynx is clear.   Eyes:      Conjunctiva/sclera: Conjunctivae normal.      Pupils: Pupils are equal, round, and reactive to light.   Cardiovascular:      Rate and Rhythm: Normal rate and regular rhythm.      Heart sounds: Normal heart sounds. No murmur heard.  Pulmonary:      Effort: Pulmonary effort is normal. No respiratory distress.      Breath sounds: Normal breath sounds.   Abdominal:      General: Bowel sounds are normal.      Palpations: Abdomen is soft.   Musculoskeletal:         General: Normal range of motion.      Cervical back: Normal range of motion and neck supple.      Right lower leg: No edema.      Left lower leg: No edema.   Skin:     General: Skin is warm and dry.   Neurological:      General: No focal deficit present.      Mental Status: He is alert and oriented to person, place, and time.      Motor: No weakness.      Coordination: Coordination normal.      Deep Tendon Reflexes: Reflexes are normal and symmetric.   Psychiatric:         Mood and Affect: Mood normal.         Behavior: Behavior normal.         Thought Content: Thought content normal.         Assessment & Plan:   1. Pre-op examination (Primary)  -     CBC With  Differential With Platelet; Future; Expected date: 03/07/2025  -     Comp Metabolic Panel (14); Future; Expected date: 03/07/2025  -     EKG 12 Lead; Future; Expected date: 03/07/2025  -     Prothrombin Time (PT); Future; Expected date: 03/07/2025  -     PTT, Activated; Future; Expected date: 03/07/2025  -     XR CHEST PA + LAT CHEST (CPT=71046); Future; Expected date: 03/07/2025  - Pre op labs as requested by surgery center.  - Discussed he will need to stop ASA and all NSAIDs 1 week prior to surgery.  - Surgical clearance pending results of testing performed today.  2. Mild intermittent asthma without complication (HCC)  -     Albuterol Sulfate HFA; Inhale 2 puffs into the lungs every 4 (four) hours as needed.  Dispense: 1 each; Refill: 2  -     XR CHEST PA + LAT CHEST (CPT=71046); Future; Expected date: 03/07/2025  - Requested refill of his albuterol inhaler. Asthma well controlled based on history.  3. Essential hypertension        - Stable on current regimen for many years and is faithful with annual physical exams - BP within goal range - continue present management.      [address] - [facility]  Fax number [phone]  Phone [phone]    ADDENDUM:  Preoperative testing reviewed.  Patient is cleared for upcoming surgery without further testing.  Clearance is effective for 30 days from the original encounter date.    COLEEN Hackett  3/10/2025       Return after surgery, or, for annual physical in November.    COLEEN Hackett, 3/7/2025, 1:32 PM     This note was prepared using Dragon Medical voice recognition dictation software. As a result errors may occur. When identified, these errors have been corrected. While every attempt is made to correct errors during dictation discrepancies may still exist.

## 2025-03-11 ENCOUNTER — TELEPHONE (OUTPATIENT)
Dept: INTERNAL MEDICINE CLINIC | Facility: CLINIC | Age: 70
End: 2025-03-11

## 2025-03-11 NOTE — TELEPHONE ENCOUNTER
Good morning! Please forward pre-op CBC, CMP, PT, PTT, EKG, CXR and my H&P from 3/7/25 to the surgical facility listed towards the bottom of my note. Thank you!

## 2025-03-28 RX ORDER — METOPROLOL SUCCINATE 100 MG/1
100 TABLET, EXTENDED RELEASE ORAL DAILY
Qty: 90 TABLET | Refills: 3 | Status: SHIPPED | OUTPATIENT
Start: 2025-03-28

## 2025-03-28 NOTE — TELEPHONE ENCOUNTER
Refill passed per [facility] protocol.    Requested Prescriptions   Pending Prescriptions Disp Refills    METOPROLOL SUCCINATE  MG Oral Tablet 24 Hr [Pharmacy Med Name: Metoprolol Succinate  MG Oral Tablet Extended Release 24 Hour] 90 tablet 3     Sig: TAKE 1 TABLET BY MOUTH DAILY       Hypertension Medications Protocol Passed - 3/28/2025  1:32 PM        Passed - CMP or BMP in past 12 months        Passed - Last BP reading less than 140/90     BP Readings from Last 1 Encounters:   03/07/25 134/78               Passed - In person appointment or virtual visit in the past 12 mos or appointment in next 3 mos     Recent Outpatient Visits              3 weeks [facility]    Office Visit    4 months ago [facility]    Office Visit    5 months ago Upper respiratory tract infection, unspecified type    [facility] Jarod Husain MD    Office Visit    9 months ago Mild intermittent asthma with acute exacerbation (HCC)    [facility]    Office Visit    1 year ago Rectal bleeding    [facility] Jaquan Dixon MD    Office Visit          Future Appointments         Provider Department Appt Notes    In 1 week Kaley Carrion APRN [facility] Follow up from test results                    Passed - EGFRCR or GFRAA > 50     GFR Evaluation  EGFRCR: 82 , resulted on 3/7/2025          Passed - Medication is active on med list             Recent Outpatient Visits              3 weeks ago [facility]    Office Visit    4 months ago Annual  physical exam    [facility] Kaley Carrion APRN    Office Visit    5 months ago Upper respiratory tract infection, unspecified type    [facility] Jarod Husain MD    Office Visit    9 months ago Mild intermittent asthma with acute exacerbation (HCC)    [facility] Kaley Carrion APRN    Office Visit    1 year ago Rectal bleeding    [facility] Jaquan Dixon MD    Office Visit            Future Appointments         Provider Department Appt Notes    In 1 week Kaley Carrion APRN [facility] Follow up from test results

## 2025-05-08 RX ORDER — AMLODIPINE BESYLATE 10 MG/1
10 TABLET ORAL DAILY
Qty: 90 TABLET | Refills: 3 | OUTPATIENT
Start: 2025-05-08

## 2025-05-08 RX ORDER — SILDENAFIL 50 MG/1
50 TABLET, FILM COATED ORAL DAILY PRN
Qty: 12 TABLET | Refills: 1 | Status: SHIPPED | OUTPATIENT
Start: 2025-05-08

## 2025-05-08 NOTE — TELEPHONE ENCOUNTER
Refill passes per [facility] protocol.    Future Appointments   Date Time Provider Department Center   11/10/2025  3:00 PM Jarod Husain MD [facility]

## 2025-05-22 RX ORDER — AMLODIPINE BESYLATE 10 MG/1
10 TABLET ORAL DAILY
Qty: 90 TABLET | Refills: 3 | OUTPATIENT
Start: 2025-05-22

## 2025-07-24 ENCOUNTER — OFFICE VISIT (OUTPATIENT)
Dept: INTERNAL MEDICINE CLINIC | Facility: CLINIC | Age: 70
End: 2025-07-24
Payer: COMMERCIAL

## 2025-07-24 ENCOUNTER — LAB ENCOUNTER (OUTPATIENT)
Dept: LAB | Age: 70
End: 2025-07-24
Attending: NURSE PRACTITIONER
Payer: COMMERCIAL

## 2025-07-24 VITALS
HEART RATE: 74 BPM | OXYGEN SATURATION: 98 % | DIASTOLIC BLOOD PRESSURE: 87 MMHG | BODY MASS INDEX: 40.28 KG/M2 | WEIGHT: 297.38 LBS | HEIGHT: 72 IN | SYSTOLIC BLOOD PRESSURE: 127 MMHG

## 2025-07-24 DIAGNOSIS — Z80.42 FAMILY HISTORY OF PROSTATE CANCER: ICD-10-CM

## 2025-07-24 DIAGNOSIS — I10 ESSENTIAL HYPERTENSION: ICD-10-CM

## 2025-07-24 DIAGNOSIS — M48.062 SPINAL STENOSIS OF LUMBAR REGION WITH NEUROGENIC CLAUDICATION: ICD-10-CM

## 2025-07-24 DIAGNOSIS — R35.0 URINARY FREQUENCY: ICD-10-CM

## 2025-07-24 DIAGNOSIS — R35.0 URINARY FREQUENCY: Primary | ICD-10-CM

## 2025-07-24 LAB
BILIRUB UR QL: NEGATIVE
CLARITY UR: CLEAR
COLOR UR: YELLOW
GLUCOSE UR-MCNC: NORMAL MG/DL
HGB UR QL STRIP.AUTO: NEGATIVE
LEUKOCYTE ESTERASE UR QL STRIP.AUTO: NEGATIVE
NITRITE UR QL STRIP.AUTO: NEGATIVE
PH UR: 5.5 [PH] (ref 5–8)
PROT UR-MCNC: NEGATIVE MG/DL
PSA SERPL-MCNC: 1.77 NG/ML (ref ?–4)
SP GR UR STRIP: 1.02 (ref 1–1.03)
UROBILINOGEN UR STRIP-ACNC: NORMAL

## 2025-07-24 PROCEDURE — 3008F BODY MASS INDEX DOCD: CPT | Performed by: NURSE PRACTITIONER

## 2025-07-24 PROCEDURE — 81003 URINALYSIS AUTO W/O SCOPE: CPT | Performed by: NURSE PRACTITIONER

## 2025-07-24 PROCEDURE — 99214 OFFICE O/P EST MOD 30 MIN: CPT | Performed by: NURSE PRACTITIONER

## 2025-07-24 PROCEDURE — 3074F SYST BP LT 130 MM HG: CPT | Performed by: NURSE PRACTITIONER

## 2025-07-24 PROCEDURE — 3079F DIAST BP 80-89 MM HG: CPT | Performed by: NURSE PRACTITIONER

## 2025-07-24 PROCEDURE — 36415 COLL VENOUS BLD VENIPUNCTURE: CPT

## 2025-07-24 PROCEDURE — 84153 ASSAY OF PSA TOTAL: CPT

## 2025-07-24 NOTE — PROGRESS NOTES
David Koch is a 70 year old male.  Chief Complaint   Patient presents with    Prostate Problem     HPI:   He presents to the office with urinary frequency. He has had urinary frequency for awhile but the symptoms have worsened. He had an episode of sharp pain in his scrotal area after sitting down on a wooden stool. He is concerned about his prostate. His Dad had prostate cancer. No dysuria or hematuria.     He typically has lower back pain which radiates down to his knees but on Monday he had scrotal pain when walking.     MRI lumbar spine 2/2022  Moderate degenerative changes in the lumbar spine.  There is multilevel spinal canal narrowing secondary to posterior disc osteophyte complexes, disc bulges, facet arthropathy, and prominent epidural fat.  There is moderate spinal canal stenosis at L4-5 primarily due to epidural lipomatosis.   There is moderate to severe bilateral foraminal narrowing at L4-5 with moderate facet arthropathy.  Moderate bilateral foraminal narrowing is seen at L5-S1.      He denies any episodes of bowel or bladder incontinence.   Current Medications[1]   Past Medical History[2]   Past Surgical History[3]   Social History:  Short Social Hx on File[4]   Family History[5]   Allergies[6]     REVIEW OF SYSTEMS:     Review of Systems   Constitutional:  Negative for fever.   HENT: Negative.     Respiratory:  Negative for cough, shortness of breath and wheezing.    Cardiovascular:  Negative for chest pain.   Gastrointestinal:  Negative for abdominal pain.   Genitourinary:  Positive for frequency. Negative for dysuria, hematuria, scrotal swelling and testicular pain.   Musculoskeletal:  Positive for back pain.   Skin: Negative.    Psychiatric/Behavioral: Negative.        Wt Readings from Last 5 Encounters:   07/24/25 297 lb 6.4 oz (134.9 kg)   03/07/25 296 lb 9.6 oz (134.5 kg)   11/11/24 290 lb (131.5 kg)   10/23/24 294 lb (133.4 kg)   06/25/24 292 lb (132.5 kg)     Body mass index is 40.33  kg/m².      EXAM:   /87 (BP Location: Right arm, Patient Position: Sitting, Cuff Size: large)   Pulse 74   Ht 6' (1.829 m)   Wt 297 lb 6.4 oz (134.9 kg)   SpO2 98%   BMI 40.33 kg/m²     Physical Exam  Vitals reviewed.   Constitutional:       Appearance: Normal appearance.   HENT:      Head: Normocephalic.   Cardiovascular:      Rate and Rhythm: Normal rate and regular rhythm.      Pulses: Normal pulses.   Pulmonary:      Breath sounds: Normal breath sounds. No wheezing.   Musculoskeletal:         General: No swelling. Normal range of motion.   Skin:     General: Skin is warm and dry.   Neurological:      Mental Status: He is alert and oriented to person, place, and time.   Psychiatric:         Mood and Affect: Mood normal.         Behavior: Behavior normal.            ASSESSMENT AND PLAN:   1. Urinary frequency  - check UA and PSA  - follow up with urology for further management   - PSA - Diagnostic [E]; Future  - Urinalysis with Culture Reflex  - Urology Referral - In Network    2. Spinal stenosis of lumbar region with neurogenic claudication  - patient is concerned about pain in the scrotal area. DW patient that compression in the lower back can refer pain to the scrotum. Follow up with urology if work up is negative then patient will be referred to pain management and or have repeat MRI completed.   - MRI of lumbar spine 2/2022    3. Family history of prostate cancer  - PSA - Diagnostic [E]; Future  - Urology Referral - In Network    4. Essential hypertension  - BP stable in office   - CPM      The patient indicates understanding of these issues and agrees to the plan.  Return for if symptoms do not resolve.         [1]   Current Outpatient Medications   Medication Sig Dispense Refill    Sildenafil Citrate 50 MG Oral Tab Take 1 tablet (50 mg total) by mouth daily as needed for Erectile Dysfunction. 12 tablet 1    metoprolol succinate  MG Oral Tablet 24 Hr Take 1 tablet (100 mg total) by mouth  daily. 90 tablet 3    albuterol (PROVENTIL HFA) 108 (90 Base) MCG/ACT Inhalation Aero Soln Inhale 2 puffs into the lungs every 4 (four) hours as needed. 1 each 2    ibuprofen 800 MG Oral Tab Take 1 tablet (800 mg total) by mouth 3 (three) times daily as needed.      fluticasone propionate 50 MCG/ACT Nasal Suspension 2 sprays by Nasal route daily. 48 g 3    Olmesartan Medoxomil-HCTZ 40-25 MG Oral Tab Take 1 tablet by mouth daily. 90 tablet 3    amLODIPine 10 MG Oral Tab Take 1 tablet (10 mg total) by mouth daily. 90 tablet 3    aspirin 81 MG Oral Tab EC Take 1 tablet (81 mg total) by mouth daily.      naproxen 500 MG Oral Tab Take 1 tablet (500 mg total) by mouth 2 (two) times daily as needed. 15 tablet 0    Dorzolamide HCl 2 % Ophthalmic Solution       brimonidine Tartrate 0.2 % Ophthalmic Solution INSTILL 1 DROP TWO TIMES A DAY IN RIGHT EYE  6    latanoprost 0.005 % Ophthalmic Solution Place 1 drop into both eyes nightly.      Flaxseed, Linseed, (FLAXSEED OIL) 1000 MG Oral Cap Take  by mouth.      Multiple Vitamin (MULTIVITAMINS) Oral Cap Take 1 capsule by mouth.      Fish Oil-Cholecalciferol (FISH OIL + D3 OR) Take 1 capsule by mouth daily.      COD LIVER OIL OR Take 1 capsule by mouth daily.     [2]   Past Medical History:   Arrhythmia    Asthma (HCC)    Atrial fibrillation (HCC)    2005 - medical management; 2008 - [facility]; converted to NSR    Bronchitis    drug therapy    Extrinsic asthma, unspecified    Quadriceps tendon rupture, left, initial encounter    Toenail fungus    Unspecified essential hypertension   [3]   Past Surgical History:  Procedure Laterality Date    Appendectomy      Appendectomy      Colonoscopy      Colonoscopy N/A 10/2/2019    Procedure: COLONOSCOPY;  Surgeon: Jaquan Dixon MD;  Location: [facility] ENDOSCOPY    Other surgical history      (L) knee muscle surgery   [4]   Social History  Socioeconomic History    Marital status:    Tobacco Use    Smoking status:  Never    Smokeless tobacco: Never   Vaping Use    Vaping status: Never Used   Substance and Sexual Activity    Alcohol use: Yes     Comment: occasional    Drug use: No    Sexual activity: Not Currently     Partners: Female   Other Topics Concern    Caffeine Concern Yes     Comment: chocolate/tea/coffee/soda - not daily   [5]   Family History  Problem Relation Age of Onset    Cancer Mother         kidney cancer    Hypertension Mother     Hypertension Father     Prostate Cancer Father 77    Cancer Father     Diabetes Paternal Grandfather     Arthritis Daughter         rheumatoid    Heart Disease Paternal Grandmother         premature CAD    Cancer Sister     Heart Disorder Neg     Lipids Neg    [6]   Allergies  Allergen Reactions    Penicillins DIZZINESS     Other reaction(s): Dizziness

## 2025-08-29 ENCOUNTER — TELEPHONE (OUTPATIENT)
Dept: INTERNAL MEDICINE CLINIC | Facility: CLINIC | Age: 70
End: 2025-08-29

## (undated) DEVICE — LINE MNTR ADLT SET O2 INTMD

## (undated) DEVICE — MEDI-VAC NON-CONDUCTIVE SUCTION TUBING 6MM X 1.8M (6FT.) L: Brand: CARDINAL HEALTH

## (undated) DEVICE — 35 ML SYRINGE REGULAR TIP: Brand: MONOJECT

## (undated) DEVICE — Device: Brand: CUSTOM PROCEDURE KIT

## (undated) DEVICE — 6 ML SYRINGE LUER-LOCK TIP: Brand: MONOJECT

## (undated) DEVICE — 3 ML SYRINGE LUER-LOCK TIP: Brand: MONOJECT

## (undated) DEVICE — Device: Brand: DEFENDO AIR/WATER/SUCTION AND BIOPSY VALVE

## (undated) NOTE — MR AVS SNAPSHOT
[address]  [phone]               Thank you for choosing us for your health care visit with Anant Sapp, PT. We are glad to serve you and happy to provide you with this summary of your visit.   Please the building. For security purposes, please check in with the reception staff at every visit.                                           Jun 20, 2017 10:00 AM   PT VISIT BY THERAPIST with Nicolle Otto PT   THE [facility] OF [facility] Physical Therapy in [facility] (EDW inside the [employer], at [facility] (enter via [facility]). Convenient parking is available in the parking lot in front of the [employer].   When you enter the [employer], please check in with the Take 1 capsule by mouth.  Indications: 3 times week, mon/wed/fri           Olmesartan Medoxomil-HCTZ 40-25 MG Tabs   1 tablet by mouth daily   Commonly known as:  BENICAR HCT           TGT PSYLLIUM FIBER 0.52 g Caps   Generic drug:  Psyllium   Take  by mout

## (undated) NOTE — LETTER
Date & Time: 8/3/2022, 1:08 PM  Patient: Maru Damon  Encounter Provider(s):    Mortimer Rumble, PA-C       To Whom It May Concern:    Mrau Damon was seen and treated in our department on 8/3/2022. He should not return to work until Sunday, August 7 or until symptom free for 24 hours, whichever is longer.     If you have any questions or concerns, please do not hesitate to call.        _____________________________  Physician/APC Signature

## (undated) NOTE — MR AVS SNAPSHOT
[address]  [phone]               Thank you for choosing us for your health care visit with Floyd Gonzalez PT. We are glad to serve you and happy to provide you with this summary of your visit.   Please he the building. For security purposes, please check in with the reception staff at every visit.                                           Mar 20, 2017  6:00 PM   WORKERS COMPENSATION POST OP with Merline Mitchell, MD   [address] Take 1 capsule by mouth.  Indications: 3 times week, mon/wed/fri           Olmesartan Medoxomil-HCTZ 40-25 MG Tabs   1 tablet by mouth daily   Commonly known as:  BENICAR HCT           TGT PSYLLIUM FIBER 0.52 g Caps   Generic drug:  Psyllium   Take  by mout

## (undated) NOTE — MR AVS SNAPSHOT
[address]  [phone]               Thank you for choosing us for your health care visit with Lenin Mendez PT. We are glad to serve you and happy to provide you with this summary of your visit.   Please he available in the parking lot in front of the [employer]. When you enter the [employer], please check in with the  reception staff. They will take your name and direct you to our P.T. clinic within the building.  For security purposes, ple Allergies as of May 22, 2017     Penicillins     Other reaction(s): Dizziness                   Current Medications          This list is accurate as of: 5/22/17  7:37 PM.  Always use your most recent med list.                Albuterol Sulfate  (90 * Notice: This list has 2 medication(s) that are the same as other medications prescribed for you. Read the directions carefully, and ask your doctor or other care provider to review them with you.             Melony     Call the [employer] for assistance

## (undated) NOTE — LETTER
Patient Name: Cony Moreno  YOB: 1955          MRN number:  [id number]  Date:  8/1/2017  Referring Physician:  Dr. Cristiana Montiel MD     Dx: work conditioning s/p L quad tendon repair 1/24/2017        Authorized # of Visits:  20 sessions Plan: MD f/u in 2 days.      Thank you for your referral.  If you have any questions, please contact me at Dept: [phone]    Sincerely,  Electronically signed by therapist: Wilma Negron PT

## (undated) NOTE — MR AVS SNAPSHOT
[address]  [phone]               Thank you for choosing us for your health care visit with Jason Martínez PT. We are glad to serve you and happy to provide you with this summary of your visit.   Please he available in the parking lot in front of the [employer]. When you enter the [employer], please check in with the  reception staff. They will take your name and direct you to our P.T. clinic within the building.  For security purposes, ple Allergies as of May 31, 2017     Penicillins     Other reaction(s): Dizziness                   Current Medications          This list is accurate as of: 5/31/17  6:14 PM.  Always use your most recent med list.                Albuterol Sulfate  (90 * Notice: This list has 2 medication(s) that are the same as other medications prescribed for you. Read the directions carefully, and ask your doctor or other care provider to review them with you.             Melony     Call the [employer] for assistance

## (undated) NOTE — MR AVS SNAPSHOT
[address]  [phone]               Thank you for choosing us for your health care visit with Anant Sapp, PT. We are glad to serve you and happy to provide you with this summary of your visit.   Please the building. For security purposes, please check in with the reception staff at every visit.                                           Jun 20, 2017 10:00 AM   PT VISIT BY THERAPIST with Mikie Angel PT   THE [facility] Physical Therapy in [place] (EDW inside the [employer], at [facility] (enter via [facility]). Convenient parking is available in the parking lot in front of the [facility].   When you enter the [facility], please check in with the Take 1 capsule by mouth.  Indications: 3 times week, mon/wed/fri           Olmesartan Medoxomil-HCTZ 40-25 MG Tabs   1 tablet by mouth daily   Commonly known as:  BENICAR HCT           TGT PSYLLIUM FIBER 0.52 g Caps   Generic drug:  Psyllium   Take  by mout

## (undated) NOTE — LETTER
Patient Name: Rekha Mcdonough  : 7/3/1955  MRN: [id number]  Patient Address: [facility] [address] [facility] [address]      Coronavirus Disease 2019 (COVID-19)     [facility] is committed to the safety and well-being of our patients, members, em carefully. If your symptoms get worse, call your healthcare provider immediately. 3. Get rest and stay hydrated.    4. If you have a medical appointment, call the healthcare provider ahead of time and tell them that you have or may have COVID-19.  5. For m of fever-reducing medications; and  · Improvement in respiratory symptoms (e.g., cough, shortness of breath); and  · At least 10 days have passed since symptoms first appeared OR if asymptomatic patient or date of symptom onset is unclear then use 10 days donors must:    · Have had a confirmed diagnosis of COVID-19  · Be symptom-free for at least 14 days*    *Some people will be required to have a repeat COVID-19 test in order to be eligible to donate.  If you’re instructed by Donal that a repeat test is r random. Researchers are trying to identify similarities between people with a Post-COVID condition to better understand if there are risk factors. How do I prevent a Post-COVID condition?   The best way to prevent the long-term symptoms of COVID-19 is

## (undated) NOTE — MR AVS SNAPSHOT
[address]  [phone]               Thank you for choosing us for your health care visit with Fidel Morales PT. We are glad to serve you and happy to provide you with this summary of your visit.   Please he the building. For security purposes, please check in with the reception staff at every visit.                                           Apr 17, 2017  6:00 PM   WORKERS COMPENSATION POST OP with Vijay Sky MD   [address] Allergies as of Apr 10, 2017     Penicillins     Other reaction(s): Dizziness                   Current Medications          This list is accurate as of: 4/10/17  6:20 PM.  Always use your most recent med list.                Albuterol Sulfate  (90 your doctor or other care provider to review them with you. [employer]     Call the [employer] for assistance with your inactive [employer]EME account    If you have questions, you can call [phone] to talk to our [facility] Staff.  Remember, [address] [facility]

## (undated) NOTE — MR AVS SNAPSHOT
[address] [phone]               Thank you for choosing us for your health care visit with Susy Lomeli PT. We are glad to serve you and happy to provide you with this summary of your visit.   Please he the building. For security purposes, please check in with the reception staff at every visit.                                           Apr 03, 2017  1:45 PM   PT VISIT BY THERAPIST with ALVARO Lockhart Physical Therapy in [place] (EDW Sev inside the [employer], at [facility] (enter via [facility]). Convenient parking is available in the parking lot in front of the [employer].   When you enter the [employer], please check in with the Metoprolol Succinate  MG Tb24   1 tablet by mouth daily   Commonly known as:  TOPROL XL           MULTIVITAMINS Caps   Take 1 capsule by mouth.  Indications: 3 times week, mon/wed/fri           Olmesartan Medoxomil-HCTZ 40-25 MG Tabs   1 tablet by mo

## (undated) NOTE — MR AVS SNAPSHOT
FABIEN [address]  [phone]               Thank you for choosing us for your health care visit with Nick Worrell PT. We are glad to serve you and happy to provide you with this summary of your visit.   Please he the building. For security purposes, please check in with the reception staff at every visit.                                           May 08, 2017  5:30 PM   PT VISIT BY THERAPIST with Hillary Nevarez PT   THE [facility] Physical Therapy in [facility] (EDW Sev inside the [employer], at [facility] (enter via [facility]). Convenient parking is available in the parking lot in front of the [facility].   When you enter the [facility], please check in with the Take 1 capsule by mouth.  Indications: 3 times week, mon/wed/fri           Olmesartan Medoxomil-HCTZ 40-25 MG Tabs   1 tablet by mouth daily   Commonly known as:  BENICAR HCT           TGT PSYLLIUM FIBER 0.52 g Caps   Generic drug:  Psyllium   Take  by mout

## (undated) NOTE — MR AVS SNAPSHOT
[address]  [phone]               Thank you for choosing us for your health care visit with Lambert Casiano PT. We are glad to serve you and happy to provide you with this summary of your visit.   Please he the building. For security purposes, please check in with the reception staff at every visit.                                           May 01, 2017  5:30 PM   PT VISIT BY THERAPIST with Lyric Tan PT   THE [facility] in [facility] (EDW Sev inside the [employer], at [facility] (enter via [facility]). Convenient parking is available in the parking lot in front of the [employer].   When you enter the [employer], please check in with the Take 1 capsule by mouth.  Indications: 3 times week, mon/wed/fri           Olmesartan Medoxomil-HCTZ 40-25 MG Tabs   1 tablet by mouth daily   Commonly known as:  BENICAR HCT           TGT PSYLLIUM FIBER 0.52 g Caps   Generic drug:  Psyllium   Take  by mout

## (undated) NOTE — LETTER
October 18, 2017     [address]  [address] [facility] [address]      Dear Francesco Lundberg:    Below are the results from your recent visit:    Resulted Orders   COMP METABOLIC PANEL (14)   Result Value Ref Range    Glucose 98 70 - 99 mg/dL    Sodium Monocyte Absolute 0.6 0.0 - 1.0 K/UL    Eosinophil Absolute 0.3 0.0 - 0.7 K/UL    Basophil Absolute 0.1 0.0 - 0.2 K/UL    ANISOCYTOSIS 1+      Microcytosis 1+      Polychromasia 1+      Ovalocytes 1+        Result Value Ref Range                The blood

## (undated) NOTE — LETTER
11/21/2019              Luca Chacon2         To whom it may concern,    Gladys Patiño is under my care and will not be able to attend work on November 25 th 2019      Sincerely,      NORA Craft

## (undated) NOTE — MR AVS SNAPSHOT
[address]  [phone]               Thank you for choosing us for your health care visit with Floyd Gonzalez PT. We are glad to serve you and happy to provide you with this summary of your visit.   Please he the building. For security purposes, please check in with the reception staff at every visit.                                           Mar 15, 2017  5:30 PM   PT VISIT BY THERAPIST with Romayne Cliche, PT   THE [facility] Physical Therapy in [facility] (EDW Sev drip.  Causes sedation no driving or operating machinery after ingestion for 8 hours. This is over-the-counter. ). Commonly known as:  BENADRYL           FISH OIL + D3 OR   Take 1 capsule by mouth daily.            Flaxseed Oil 1000 MG Caps   Take  by wendy

## (undated) NOTE — MR AVS SNAPSHOT
[address]  [phone]               Thank you for choosing us for your health care visit with Anant Sapp, PT. We are glad to serve you and happy to provide you with this summary of your visit.   Please the building. For security purposes, please check in with the reception staff at every visit.                                           Jul 05, 2017  2:45 PM   PT VISIT BY THERAPIST with Jyoti Zepeda PT   THE [facility] Physical Therapy in [address] [place] [address]  ([place] Allergies as of Jun 20, 2017     Penicillins     Other reaction(s): Dizziness                   Current Medications          This list is accurate as of: 6/20/17  3:58 PM.  Always use your most recent med list.                Albuterol Sulfate  (90 If you have questions, you can call [phone] to talk to our [facility] Staff. Remember, [employer] is NOT to be used for urgent needs. For medical emergencies, dial 911. Visit [email or web address] to learn more.            Visit [facility]

## (undated) NOTE — MR AVS SNAPSHOT
[address]  [phone]               Thank you for choosing us for your health care visit with Maikel Strong PT. We are glad to serve you and happy to provide you with this summary of your visit.   Please he the building. For security purposes, please check in with the reception staff at every visit.                                           Mar 08, 2017  5:30 PM   PT VISIT BY THERAPIST with Suzy Banks PT   THE [facility] OF [facility] Physical Therapy in [facility] (EDW Sev inside the [employer], at [facility] (enter via [facility]). Convenient parking is available in the parking lot in front of the [employer].   When you enter the [employer], please check in with the Metoprolol Succinate  MG Tb24   1 tablet by mouth daily   Commonly known as:  TOPROL XL           MULTIVITAMINS Caps   Take 1 capsule by mouth.  Indications: 3 times week, mon/wed/fri           Olmesartan Medoxomil-HCTZ 40-25 MG Tabs   1 tablet by mo

## (undated) NOTE — ED AVS SNAPSHOT
[facility] Emergency Department    [facility]  [address] [address]    Phone:  [phone]    Fax:  [phone]           Sujit Khan   MRN: [id number]    Department:  [facility] Emergency Department   Date of Visit:  3/17/20 RETURN TO THE ED IF ANY OTHER PROBLEMS ARISE OR IF SYMPTOMS WORSEN    Discharge References/Attachments     LEG SWELLING IN A SINGLE LEG (ENGLISH)    SKIN INFECTION, CELLULITIS (ENGLISH)      Disclosure     Insurance plans vary and the physician(s) referred CARE PHYSICIAN AT ONCE OR RETURN IMMEDIATELY TO THE EMERGENCY DEPARTMENT.     If you have been prescribed any medication(s), please fill your prescription right away and begin taking the medication(s) as directed    If the emergency physician has read X-ray coverage. Patient [address] is a Federal Navigator program that can help with your Affordable Care Act coverage, as well as all types of Medicaid plans.   To get signed up and covered, please call [phone] and ask to get set up for an insuran

## (undated) NOTE — ED AVS SNAPSHOT
[facility] in [address] [place] [address] Drive,4Th Floor    [address] [place] [address]    Phone:  [phone]    Fax:  [phone]           Brentwoodnatalie Viramontes   MRN: [id number]    Department:  THE [facility] OF [facility] Immediate Care in [facility]   Date of Visit:  3/17/2017 Si usted tiene algun problema con rodriguez sequimiento, por favor llame a nuestro adminstrador de casos al [phone]. Expect to receive an electronic request (by e-mail or text) to complete a self-assessment the day after your visit.   You may also recei Mitzi Youngblood [address] ([address] [facility]) [place] [address]. ([address] [facility]) [address]  ([address] [facility]) [address] [facility]

## (undated) NOTE — Clinical Note
Patient Name: Idalia Del Rosario  YOB: 1955          MRN number:  [id number]  Date:  6/14/2017  Referring Physician:  Dr. Mukund Nam MD,          Work Conditioning Evaluation:    Referring Physician: Dr. Elsa Abdalla MD    Diagnosis:   L quad Today’s Treatment and Response: Discussed findings and POC for work conditioning program. Addressed poor body mechanics with lift from from floor to overhead and corrected. Client was oriented to clinic and fitness center equipment and policies.   Pt was 30 Sincerely,  Electronically signed by therapist: Charlie Godinez, PT

## (undated) NOTE — MR AVS SNAPSHOT
[address]  [phone]               Thank you for choosing us for your health care visit with Jimmy Ann PT. We are glad to serve you and happy to provide you with this summary of your visit.   Please he the building. For security purposes, please check in with the reception staff at every visit.                                           Apr 06, 2017 11:45 AM   PT VISIT BY THERAPIST with Rosita Valverde PT   THE [facility] OF [facility] Physical Therapy in [facility] (EDW Sev inside the [employer], at [facility] (enter via [facility]). Convenient parking is available in the parking lot in front of the [employer].   When you enter the [employer], please check in with the Take 1 capsule by mouth.  Indications: 3 times week, mon/wed/fri           Olmesartan Medoxomil-HCTZ 40-25 MG Tabs   1 tablet by mouth daily   Commonly known as:  BENICAR HCT           TGT PSYLLIUM FIBER 0.52 g Caps   Generic drug:  Psyllium   Take  by mout

## (undated) NOTE — MR AVS SNAPSHOT
FABIEN [address]  [phone]               Thank you for choosing us for your health care visit with Blake Rosas PT. We are glad to serve you and happy to provide you with this summary of your visit.   Please he enter the [employer], please check in with the  reception staff. They will take your name and direct you to our P.T. clinic within the building. For security purposes, please check in with the reception staff at every visit. Mariah Whiting   [phone]           Your appointment is scheduled at the [facility] Physical Therapy Department, inside the [employer], at [facility] (enter via [facility]).   Convenient parking is [employer] GENTLY APPLY A THIN LAYER TO AFFECTED NAILS ONCE DAILY AS DIRECTED. Metoprolol Succinate  MG Tb24   1 tablet by mouth daily   Commonly known as:  TOPROL XL           MULTIVITAMINS Caps   Take 1 capsule by mouth.  Indications: 3 times week, m

## (undated) NOTE — MR AVS SNAPSHOT
[facility] SPECIALTY \[facility]\"" - [place] [address] [address] [phone]               Thank you for choosing us for your health care visit with Cheli Lawrence MD.  We are glad to serve you and happy to provide you with this summary of yo the building. For security purposes, please check in with the reception staff at every visit.                                           May 22, 2017  5:30 PM   PT VISIT BY THERAPIST with Tobi Godinez PT   THE [facility] Physical Therapy in [facility] (EDW Sev Medical Issues Discussed Today     Left foot pain    -  Primary      Instructions and Information about Your Health     None      Allergies as of May 10, 2017     Penicillins     Other reaction(s): Dizziness                Today's Vital Signs     BP Pulse TGT PSYLLIUM FIBER 0.52 g Caps   Generic drug:  Psyllium   Take  by mouth. VIAGRA 50 MG Tabs   Generic drug:  Sildenafil Citrate   TAKE 1 TABLET BY MOUTH ONE TIME A DAY AS NEEDED FOR ERECTILE DYSFUNCTION           * Notice:   This list has 2 medi

## (undated) NOTE — MR AVS SNAPSHOT
FABIEN Tom [address] [phone]               Thank you for choosing us for your health care visit with Ayaka Nugent PT. We are glad to serve you and happy to provide you with this summary of your visit.   Please he AmLODIPine Besylate 5 MG Tabs   1 tablet by mouth daily   Commonly known as:  NORVASC           aspirin 325 MG Tabs   Take 325 mg by mouth daily. COD LIVER OIL OR   Take 1 capsule by mouth daily.            DiphenhydrAMINE HCl 25 MG Caps

## (undated) NOTE — MR AVS SNAPSHOT
FAB[address]EN [address]  [phone]               Thank you for choosing us for your health care visit with Todd Schulte PT. We are glad to serve you and happy to provide you with this summary of your visit.   Please he the building. For security purposes, please check in with the reception staff at every visit.                                           May 15, 2017  5:30 PM   PT VISIT BY THERAPIST with Yolande Fregoso PT   THE [facility] OF [facility] Physical Therapy in [facility] (EDW Sev inside the [employer], at [facility] (enter via [facility]). Convenient parking is available in the parking lot in front of the [employer].   When you enter the [employer], please check in with the Take 1 capsule by mouth.  Indications: 3 times week, mon/wed/fri           Olmesartan Medoxomil-HCTZ 40-25 MG Tabs   1 tablet by mouth daily   Commonly known as:  BENICAR HCT           TGT PSYLLIUM FIBER 0.52 g Caps   Generic drug:  Psyllium   Take  by mout

## (undated) NOTE — MR AVS SNAPSHOT
[facility] SPECIALTY [facility] - [place] [address] [address] [phone]               Thank you for choosing us for your health care visit with Hammad Morris MD.  We are glad to serve you and happy to provide you with this summary o Take 1 capsule by mouth nightly as needed (Ear pressure and postnasal drip. Causes sedation no driving or operating machinery after ingestion for 8 hours. This is over-the-counter. ).    Commonly known as:  BENADRYL           FISH OIL + D3 OR   Take 1 caps

## (undated) NOTE — LETTER
Patient Name: Dolly Curtis  YOB: 1955          MRN number:  [id number]  Date:  7/6/2017  Referring Physician:  No ref.  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  Met  -Improve L quadriceps strength 4+/5 or > so he can perform lifting duties with baggage from floor to knuckle height 40lbs to

## (undated) NOTE — LETTER
ASTHMA ACTION PLAN for Cheral Cheadle     : 7/3/1955     Date: 10/03/17  Doctor:  Farideh Luevano MD  Phone for doctor or clinic: [address] [facility],[address], [address] [place] [address]  [phone]  Personal best peak flow: 46 Albuterol Sulfate HFA (PROVENTIL HFA) 108 (90 BASE) MCG/ACT Inhalation Aero Soln    Albuterol inhaler 2 puffs every 20 minutes for three treatments Inhale 2 puffs into the lungs every 4 (four) hours as needed.            Peak Flow Range Less Than:  230 L/M

## (undated) NOTE — MR AVS SNAPSHOT
FABIEN [address]  [phone]               Thank you for choosing us for your health care visit with Anant Sapp, ALVARO. We are glad to serve you and happy to provide you with this summary of your visit.   Please the building. For security purposes, please check in with the reception staff at every visit.                                           Jun 22, 2017 10:00 AM   PT VISIT BY THERAPIST with Gregoria Burkitt, PT   THE [facility] Physical Therapy in [facility] (W Allergies as of Jun 19, 2017     Penicillins     Other reaction(s): Dizziness                   Current Medications          This list is accurate as of: 6/19/17  2:11 PM.  Always use your most recent med list.                Albuterol Sulfate  (90 If you have questions, you can call [phone] to talk to our [facility] Staff. Remember, MyAppConverter is NOT to be used for urgent needs. For medical emergencies, dial 911. Visit [email or web address] to learn more.            Visit EDWARD-E

## (undated) NOTE — LETTER
9/7/2021          To Whom It May Concern:    Carissa Brandt is currently under my medical care and may return to work on Saturday 9/11/2021. If you require additional information please contact our office.       Sincerely,    COLEEN Lee

## (undated) NOTE — MR AVS SNAPSHOT
[address]EN [address]  [phone]               Thank you for choosing us for your health care visit with Eveleen Seip, PT. We are glad to serve you and happy to provide you with this summary of your visit.   Please he the building. For security purposes, please check in with the reception staff at every visit.                                           May 03, 2017  5:30 PM   PT VISIT BY THERAPIST with Nathalie Alcantar PT   THE [facility] OF [facility] Physical Therapy in [facility] (EDW Sev inside the [employer], at [facility] (enter via [facility]). Convenient parking is available in the parking lot in front of the [employer].   When you enter the [employer], please check in with the Take 1 capsule by mouth.  Indications: 3 times week, mon/wed/fri           Olmesartan Medoxomil-HCTZ 40-25 MG Tabs   1 tablet by mouth daily   Commonly known as:  BENICAR HCT           TGT PSYLLIUM FIBER 0.52 g Caps   Generic drug:  Psyllium   Take  by mout

## (undated) NOTE — LETTER
[facility] CARE IN [place]  [address] [address]  Dept: [phone]  Dept Fax: [phone]      January 9, 2018    Patient: Pearl Germain   Date of Visit: 1/9/2018       To Whom It May Concern:    Pearl Germain was seen and treate

## (undated) NOTE — MR AVS SNAPSHOT
[address]  [phone]               Thank you for choosing us for your health care visit with Bishop Claudine PT. We are glad to serve you and happy to provide you with this summary of your visit.   Please he the building. For security purposes, please check in with the reception staff at every visit.                                           May 10, 2017  5:30 PM   PT VISIT BY THERAPIST with Julio Cesar Andrew PT   THE [facility] OF [facility] Physical Therapy in [facility] (EDW Sev inside the [employer], at [facility] (enter via [address]ty [address]). Convenient parking is available in the parking lot in front of the [employer].   When you enter the [employer], please check in with the Take 1 capsule by mouth.  Indications: 3 times week, mon/wed/fri           Olmesartan Medoxomil-HCTZ 40-25 MG Tabs   1 tablet by mouth daily   Commonly known as:  BENICAR HCT           TGT PSYLLIUM FIBER 0.52 g Caps   Generic drug:  Psyllium   Take  by mout

## (undated) NOTE — MR AVS SNAPSHOT
FABIEN Tom [address]  [phone]               Thank you for choosing us for your health care visit with Anant Sapp, PT. We are glad to serve you and happy to provide you with this summary of your visit.   Please the building. For security purposes, please check in with the reception staff at every visit.                                           Jul 06, 2017  9:15 AM   PT VISIT BY THERAPIST with Mikie Angel, PT   [facility] in [address] [place] [address]  (EDW Allergies as of Jun 21, 2017     Penicillins     Other reaction(s): Dizziness                   Current Medications          This list is accurate as of: 6/21/17  4:09 PM.  Always use your most recent med list.                Albuterol Sulfate  (90 If you have questions, you can call [phone] to talk to our [facility] Staff. Remember, Recurious is NOT to be used for urgent needs. For medical emergencies, dial 911. Visit [email or web address] to learn more.            Visit EDWARD-E

## (undated) NOTE — MR AVS SNAPSHOT
[address]  [phone]               Thank you for choosing us for your health care visit with Darrin Jordan PT. We are glad to serve you and happy to provide you with this summary of your visit.   Please he the building. For security purposes, please check in with the reception staff at every visit.                                           May 31, 2017  5:30 PM   PT VISIT BY THERAPIST with Sosa Velasquez PT   THE [facility] OF [facility] Physical Therapy in [facility] (EDW Sev Allergies as of May 15, 2017     Penicillins     Other reaction(s): Dizziness                   Current Medications          This list is accurate as of: 5/15/17  6:33 PM.  Always use your most recent med list.                Albuterol Sulfate  (90 * Notice: This list has 2 medication(s) that are the same as other medications prescribed for you. Read the directions carefully, and ask your doctor or other care provider to review them with you.             Melony     Call the CornerBluedesk for assistance You don’t need to join a gym. Home exercises work great.  Put more priority on exercise in your life                    Visit [facility] online at  [email or web address].tn

## (undated) NOTE — MR AVS SNAPSHOT
[address]  [phone]               Thank you for choosing us for your health care visit with Ranulfo Silva PT. We are glad to serve you and happy to provide you with this summary of your visit.   Please he the building. For security purposes, please check in with the reception staff at every visit.                                           Apr 12, 2017  5:30 PM   PT VISIT BY THERAPIST with ALVARO [facility] Physical Therapy in [place] (EDW Sev Allergies as of Apr 03, 2017     Penicillins     Other reaction(s): Dizziness                   Current Medications          This list is accurate as of: 4/3/17  6:21 PM.  Always use your most recent med list.                Albuterol Sulfate  (90 B your doctor or other care provider to review them with you. MyChart     Call the Azalea Networksk for assistance with your inactive Tap.Me account    If you have questions, you can call [phone] to talk to our [facility] Staff.  Remember, [facility]

## (undated) NOTE — MR AVS SNAPSHOT
[address] [phone]               Thank you for choosing us for your health care visit with Maximo Hong PT. We are glad to serve you and happy to provide you with this summary of your visit.   Please he HYDROcodone-acetaminophen  MG Tabs   Take 1 tablet by mouth every 6 (six) hours as needed for Pain.    Commonly known as:  Debora Bloom 10 % Soln   Generic drug:  Efinaconazole   GENTLY APPLY A THIN LAYER TO AFFECTED NAILS ONCE DAILY AS DIR

## (undated) NOTE — MR AVS SNAPSHOT
FABIEN Tom [address]  [phone]               Thank you for choosing us for your health care visit with Ranulfo Silva PT. We are glad to serve you and happy to provide you with this summary of your visit.   Please he the building. For security purposes, please check in with the reception staff at every visit.                                           May 18, 2017  4:30 PM   PT VISIT BY THERAPIST with Khurram Green PT   THE [facility] Physical Therapy in [facility] (EDW Sev inside the [employer], at [facility] (enter via [facility]). Convenient parking is available in the parking lot in front of the [employer].   When you enter the [employer], please check in with the Take 1 capsule by mouth.  Indications: 3 times week, mon/wed/fri           Olmesartan Medoxomil-HCTZ 40-25 MG Tabs   1 tablet by mouth daily   Commonly known as:  BENICAR HCT           TGT PSYLLIUM FIBER 0.52 g Caps   Generic drug:  Psyllium   Take  by mout

## (undated) NOTE — MR AVS SNAPSHOT
[address]  [phone]               Thank you for choosing us for your health care visit with Maximo Hong PT. We are glad to serve you and happy to provide you with this summary of your visit.   Please he the building. For security purposes, please check in with the reception staff at every visit.                                           Mar 13, 2017  5:30 PM   PT VISIT BY THERAPIST with Nathalie Alcantar PT   THE [facility] OF [facility] Physical Therapy in [facility] (EDW Sev Allergies as of Mar 01, 2017     Penicillins     Other reaction(s): Dizziness                   Current Medications          This list is accurate as of: 3/1/17  6:14 PM.  Always use your most recent med list.                Albuterol Sulfate  (90 B Call the JournalDock for assistance with your inactive Simparel account    If you have questions, you can call [phone] to talk to our [facility] Staff. Remember, Simparel is NOT to be used for urgent needs. For medical emergencies, dial 911.     Tj

## (undated) NOTE — MR AVS SNAPSHOT
FABIEN Tom [address] [phone]               Thank you for choosing us for your health care visit with Danny Palafox PT. We are glad to serve you and happy to provide you with this summary of your visit.   Please he the building. For security purposes, please check in with the reception staff at every visit.                                           Apr 17, 2017  5:30 PM   PT VISIT BY THERAPIST with Xu Lopez, PT   THE [facility] OF [facility] Physical Therapy in [facility] (EDW Sev Allergies as of Apr 05, 2017     Penicillins     Other reaction(s): Dizziness                   Current Medications          This list is accurate as of: 4/5/17  6:06 PM.  Always use your most recent med list.                Albuterol Sulfate  (90 B your doctor or other care provider to review them with you. SesameaharOxatis     Call the SmartBIM for assistance with your inactive Cloakware account    If you have questions, you can call [phone] to talk to our [facility] Staff.  Remember, [facility]

## (undated) NOTE — MR AVS SNAPSHOT
FABIEN Tom [address]  [phone]               Thank you for choosing us for your health care visit with Komal Montano PT. We are glad to serve you and happy to provide you with this summary of your visit.   Please he the building. For security purposes, please check in with the reception staff at every visit.                                           Jun 05, 2017  6:00 PM   WORKERS COMPENSATION POST OP with Sánchez Louis MD   [address] Allergies as of May 18, 2017     Penicillins     Other reaction(s): Dizziness                   Current Medications          This list is accurate as of: 5/18/17  6:14 PM.  Always use your most recent med list.                Albuterol Sulfate  (90 * Notice: This list has 2 medication(s) that are the same as other medications prescribed for you. Read the directions carefully, and ask your doctor or other care provider to review them with you.             Melony     Call the Liftagodesk for assistance

## (undated) NOTE — LETTER
[facility] CARE IN [place]  [address] [address]  Dept: [phone]  Dept Fax: [phone]         October 29, 2017    Patient: Blayne Duran   YOB: 1955   Date of Visit: 10/29/2017       To Whom It May Concern:

## (undated) NOTE — MR AVS SNAPSHOT
FABIEN Tom [address]  [phone]               Thank you for choosing us for your health care visit with Anant Sapp, ALVARO. We are glad to serve you and happy to provide you with this summary of your visit.   Please the building. For security purposes, please check in with the reception staff at every visit.                                           Jun 19, 2017 12:30 PM   PT VISIT BY THERAPIST with Jessica Erickson PT   [facility] Physical Therapy in [address] [place] [address]  (EDW inside the [employer], at [facility] (enter via [facility]). Convenient parking is available in the parking lot in front of the [employer].   When you enter the [employer], please check in with the Take 1 capsule by mouth.  Indications: 3 times week, mon/wed/fri           Olmesartan Medoxomil-HCTZ 40-25 MG Tabs   1 tablet by mouth daily   Commonly known as:  BENICAR HCT           TGT PSYLLIUM FIBER 0.52 g Caps   Generic drug:  Psyllium   Take  by mout

## (undated) NOTE — MR AVS SNAPSHOT
FABIEN Tom [address]  [phone]               Thank you for choosing us for your health care visit with Komal Montano PT. We are glad to serve you and happy to provide you with this summary of your visit.   Please he the building. For security purposes, please check in with the reception staff at every visit.                                           Apr 26, 2017  5:30 PM   PT VISIT BY THERAPIST with Tangela Florez PT   THE [facility] Physical Therapy in [facility] (EDW Sev inside the [employer], at [facility] (enter via [facility]). Convenient parking is available in the parking lot in front of the [employer].   When you enter the [employer], please check in with the Take 1 capsule by mouth.  Indications: 3 times week, mon/wed/fri           Olmesartan Medoxomil-HCTZ 40-25 MG Tabs   1 tablet by mouth daily   Commonly known as:  BENICAR HCT           TGT PSYLLIUM FIBER 0.52 g Caps   Generic drug:  Psyllium   Take  by mout

## (undated) NOTE — MR AVS SNAPSHOT
[address]  [phone]               Thank you for choosing us for your health care visit with Susy Lomeli PT. We are glad to serve you and happy to provide you with this summary of your visit.   Please he available in the parking lot in front of the [employer]. When you enter the [employer], please check in with the  reception staff. They will take your name and direct you to our P.T. clinic within the building.  For security purposes, ple Allergies as of Apr 12, 2017     Penicillins     Other reaction(s): Dizziness                   Current Medications          This list is accurate as of: 4/12/17  7:01 PM.  Always use your most recent med list.                Albuterol Sulfate  (90 your doctor or other care provider to review them with you. AeternusLEDharCouchsurfing     Call the Slyce for assistance with your inactive Stitch account    If you have questions, you can call [phone] to talk to our [facility] Staff.  Remember, [facility]

## (undated) NOTE — MR AVS SNAPSHOT
FABIEN Tom [address]faro [address] [phone]               Thank you for choosing us for your health care visit with Anant Sapp, PT. We are glad to serve you and happy to provide you with this summary of your visit.   Please the building. For security purposes, please check in with the reception staff at every visit.                                           Jul 06, 2017  4:30 PM   WORKERS COMPENSATION POST OP with Fredy Cabral MD   [address] [facility] Allergies as of Jun 22, 2017     Penicillins     Other reaction(s): Dizziness                   Current Medications          This list is accurate as of: 6/22/17 11:25 AM.  Always use your most recent med list.                Albuterol Sulfate  (90 If you have questions, you can call [phone] to talk to our [facility] Staff. Remember, [employer] is NOT to be used for urgent needs. For medical emergencies, dial 911. Visit [email or web address] to learn more.            Visit EDWARD-E

## (undated) NOTE — MR AVS SNAPSHOT
FABIEN Tom Alfaro [address]  [phone]               Thank you for choosing us for your health care visit with Ranulfo Silva PT. We are glad to serve you and happy to provide you with this summary of your visit.   Please he Your appointment is scheduled at the [facility] Physical Therapy Department, inside the [address], at [facility] (enter via [facility]).   Convenient parking is available in the parking lot in front of the Fi Take 1 capsule by mouth daily. FISH OIL + D3 OR   Take 1 capsule by mouth daily. Flaxseed Oil 1000 MG Caps   Take  by mouth. Fluticasone Propionate  MCG/ACT Aero   Inhale 2 puffs into the lungs 2 (two) times daily.    Co [email or web address]

## (undated) NOTE — MR AVS SNAPSHOT
FABIEN Tom Alfaro 1284  [phone]               Thank you for choosing us for your health care visit with Anant Sapp, ALVARO. We are glad to serve you and happy to provide you with this summary of your visit.   Please the building. For security purposes, please check in with the reception staff at every visit.                                           Jun 21, 2017 10:00 AM   PT VISIT BY THERAPIST with Jamshid Horton PT   THE [facility] Physical Therapy in [facility] (EDW inside the [employer], at [facility] (enter via [facility]). Convenient parking is available in the parking lot in front of the [facility].   When you enter the [facility], please check in with the Take 1 capsule by mouth.  Indications: 3 times week, mon/wed/fri           Olmesartan Medoxomil-HCTZ 40-25 MG Tabs   1 tablet by mouth daily   Commonly known as:  BENICAR HCT           TGT PSYLLIUM FIBER 0.52 g Caps   Generic drug:  Psyllium   Take  by mout

## (undated) NOTE — MR AVS SNAPSHOT
FABIEN Tom Alfaro [address]84  [phone]               Thank you for choosing us for your health care visit with Floyd Gonzalez PT. We are glad to serve you and happy to provide you with this summary of your visit.   Please he the building. For security purposes, please check in with the reception staff at every visit.                                           Mar 06, 2017  5:30 PM   PT VISIT BY THERAPIST with Yolande Fregoso PT   THE [facility] OF [facility] Physical Therapy in [place] (EDW Sev inside the [employer], at [facility] (enter via [facility]). Convenient parking is available in the parking lot in front of the [facility].   When you enter the [facility], please check in with the Metoprolol Succinate  MG Tb24   1 tablet by mouth daily   Commonly known as:  TOPROL XL           MULTIVITAMINS Caps   Take 1 capsule by mouth.  Indications: 3 times week, mon/wed/fri           Olmesartan Medoxomil-HCTZ 40-25 MG Tabs   1 tablet by mo